# Patient Record
Sex: MALE | Race: WHITE | NOT HISPANIC OR LATINO | Employment: OTHER | URBAN - METROPOLITAN AREA
[De-identification: names, ages, dates, MRNs, and addresses within clinical notes are randomized per-mention and may not be internally consistent; named-entity substitution may affect disease eponyms.]

---

## 2018-11-28 ENCOUNTER — OFFICE VISIT (OUTPATIENT)
Dept: URGENT CARE | Facility: CLINIC | Age: 58
End: 2018-11-28
Payer: MEDICAID

## 2018-11-28 VITALS
TEMPERATURE: 98 F | HEART RATE: 89 BPM | HEIGHT: 72 IN | SYSTOLIC BLOOD PRESSURE: 149 MMHG | RESPIRATION RATE: 16 BRPM | DIASTOLIC BLOOD PRESSURE: 97 MMHG | WEIGHT: 205 LBS | BODY MASS INDEX: 27.77 KG/M2

## 2018-11-28 DIAGNOSIS — J40 BRONCHITIS: ICD-10-CM

## 2018-11-28 DIAGNOSIS — J01.90 ACUTE NON-RECURRENT SINUSITIS, UNSPECIFIED LOCATION: ICD-10-CM

## 2018-11-28 DIAGNOSIS — R03.0 ELEVATED BLOOD PRESSURE READING: Primary | ICD-10-CM

## 2018-11-28 PROCEDURE — 99214 OFFICE O/P EST MOD 30 MIN: CPT | Mod: 25,S$GLB,, | Performed by: NURSE PRACTITIONER

## 2018-11-28 RX ORDER — FLUTICASONE PROPIONATE 50 MCG
2 SPRAY, SUSPENSION (ML) NASAL DAILY
Qty: 15.8 ML | Refills: 0 | Status: SHIPPED | OUTPATIENT
Start: 2018-11-28 | End: 2019-06-25 | Stop reason: CLARIF

## 2018-11-28 RX ORDER — DEXAMETHASONE SODIUM PHOSPHATE 4 MG/ML
8 INJECTION, SOLUTION INTRA-ARTICULAR; INTRALESIONAL; INTRAMUSCULAR; INTRAVENOUS; SOFT TISSUE
Status: COMPLETED | OUTPATIENT
Start: 2018-11-28 | End: 2018-11-28

## 2018-11-28 RX ORDER — PREDNISONE 20 MG/1
40 TABLET ORAL DAILY
Qty: 10 TABLET | Refills: 0 | Status: SHIPPED | OUTPATIENT
Start: 2018-11-28 | End: 2018-12-03

## 2018-11-28 RX ORDER — CETIRIZINE HYDROCHLORIDE 10 MG/1
10 TABLET ORAL DAILY
Qty: 30 TABLET | Refills: 0 | Status: SHIPPED | OUTPATIENT
Start: 2018-11-28 | End: 2019-06-25 | Stop reason: CLARIF

## 2018-11-28 RX ORDER — AZITHROMYCIN 250 MG/1
TABLET, FILM COATED ORAL
Qty: 6 TABLET | Refills: 0 | Status: SHIPPED | OUTPATIENT
Start: 2018-11-28 | End: 2019-06-25 | Stop reason: CLARIF

## 2018-11-28 RX ORDER — PROMETHAZINE HYDROCHLORIDE AND DEXTROMETHORPHAN HYDROBROMIDE 6.25; 15 MG/5ML; MG/5ML
5 SYRUP ORAL NIGHTLY PRN
Qty: 118 ML | Refills: 0 | Status: SHIPPED | OUTPATIENT
Start: 2018-11-28 | End: 2018-12-08

## 2018-11-28 RX ORDER — BENZONATATE 100 MG/1
100 CAPSULE ORAL EVERY 6 HOURS PRN
Qty: 40 CAPSULE | Refills: 0 | Status: SHIPPED | OUTPATIENT
Start: 2018-11-28 | End: 2018-12-08

## 2018-11-28 RX ADMIN — DEXAMETHASONE SODIUM PHOSPHATE 8 MG: 4 INJECTION, SOLUTION INTRA-ARTICULAR; INTRALESIONAL; INTRAMUSCULAR; INTRAVENOUS; SOFT TISSUE at 05:11

## 2018-11-28 NOTE — PATIENT INSTRUCTIONS
Sinusitis (Antibiotic Treatment)    The sinuses are air-filled spaces within the bones of the face. They connect to the inside of the nose. Sinusitis is an inflammation of the tissue lining the sinus cavity. Sinus inflammation can occur during a cold. It can also be due to allergies to pollens and other particles in the air. Sinusitis can cause symptoms of sinus congestion and fullness. A sinus infection causes fever, headache and facial pain. There is often green or yellow drainage from the nose or into the back of the throat (post-nasal drip). You have been given antibiotics to treat this condition.  Home care:  · Take the full course of antibiotics as instructed. Do not stop taking them, even if you feel better.  · Drink plenty of water, hot tea, and other liquids. This may help thin mucus. It also may promote sinus drainage.  · Heat may help soothe painful areas of the face. Use a towel soaked in hot water. Or,  the shower and direct the hot spray onto your face. Using a vaporizer along with a menthol rub at night may also help.   · An expectorant containing guaifenesin may help thin the mucus and promote drainage from the sinuses.  · Over-the-counter decongestants may be used unless a similar medicine was prescribed. Nasal sprays work the fastest. Use one that contains phenylephrine or oxymetazoline. First blow the nose gently. Then use the spray. Do not use these medicines more often than directed on the label or symptoms may get worse. You may also use tablets containing pseudoephedrine. Avoid products that combine ingredients, because side effects may be increased. Read labels. You can also ask the pharmacist for help. (NOTE: Persons with high blood pressure should not use decongestants. They can raise blood pressure.)  · Over-the-counter antihistamines may help if allergies contributed to your sinusitis.    · Do not use nasal rinses or irrigation during an acute sinus infection, unless told to by  your health care provider. Rinsing may spread the infection to other sinuses.  · Use acetaminophen or ibuprofen to control pain, unless another pain medicine was prescribed. (If you have chronic liver or kidney disease or ever had a stomach ulcer, talk with your doctor before using these medicines. Aspirin should never be used in anyone under 18 years of age who is ill with a fever. It may cause severe liver damage.)  · Don't smoke. This can worsen symptoms.  Follow-up care  Follow up with your healthcare provider or our staff if you are not improving within the next week.  When to seek medical advice  Call your healthcare provider if any of these occur:  · Facial pain or headache becoming more severe  · Stiff neck  · Unusual drowsiness or confusion  · Swelling of the forehead or eyelids  · Vision problems, including blurred or double vision  · Fever of 100.4ºF (38ºC) or higher, or as directed by your healthcare provider  · Seizure  · Breathing problems  · Symptoms not resolving within 10 days  Date Last Reviewed: 4/13/2015  © 7539-8089 [employer]. [address] [place] [address]. All rights reserved. This information is not intended as a substitute for professional medical care. Always follow your healthcare professional's instructions.        What Is Acute Bronchitis?  Acute bronchitis is when the airways in your lungs (bronchial tubes) become red and swollen (inflamed). It is usually caused by a viral infection. But it can also occur because of a bacteria or allergen. Symptoms include a cough that produces yellow or greenish mucus and can last for days or sometimes weeks.  Inside healthy lungs    Air travels in and out of the lungs through the airways. The linings of these airways produce sticky mucus. This mucus traps particles that enter the lungs. Tiny structures called cilia then sweep the particles out of the airways.     Healthy airway: Airways are normally open. Air moves in and  out easily.      Healthy cilia: Tiny, hairlike cilia sweep mucus and particles up and out of the airways.   Lungs with bronchitis  Bronchitis often occurs with a cold or the flu virus. The airways become inflamed (red and swollen). There is a deep hacking cough from the extra mucus. Other symptoms may include:  · Wheezing  · Chest discomfort  · Shortness of breath  · Mild fever  A second infection, this time due to bacteria, may then occur. And airways irritated by allergens or smoke are more likely to get infected.        Inflamed airway: Inflammation and extra mucus narrow the airway, causing shortness of breath.      Impaired cilia: Extra mucus impairs cilia, causing congestion and wheezing. Smoking makes the problem worse.   Making a diagnosis  A physical exam, health history, and certain tests help your healthcare provider make the diagnosis.  Health history  Your healthcare provider will ask you about your symptoms.  The exam  Your provider listens to your chest for signs of congestion. He or she may also check your ears, nose, and throat.  Possible tests  · A sputum test for bacteria. This requires a sample of mucus from your lungs.  · A nasal or throat swab. This tests to see if you have a bacterial infection.  · A chest X-ray. This is done if your healthcare provider thinks you have pneumonia.  · Tests to check for an underlying condition. Other tests may be done to check for things such as allergies, asthma, or COPD (chronic obstructive pulmonary disease). You may need to see a specialist for more lung function testing.  Treating a cough  The main treatment for bronchitis is easing symptoms. Avoiding smoke, allergens, and other things that trigger coughing can often help. If the infection is bacterial, you may be given antibiotics. During the illness, it's important to get plenty of sleep. To ease symptoms:  · Dont smoke. Also avoid secondhand smoke.  · Use a humidifier. Or try breathing in steam from a  hot shower. This may help loosen mucus.  · Drink a lot of water and juice. They can soothe the throat and may help thin mucus.  · Sit up or use extra pillows when in bed. This helps to lessen coughing and congestion.  · Ask your provider about using medicine. Ask about using cough medicine, pain and fever medicine, or a decongestant.  Antibiotics  Most cases of bronchitis are caused by cold or flu viruses. They dont need antibiotics to treat them, even if your mucus is thick and green or yellow. Antibiotics dont treat viral illness and antibiotics have not been shown to have any benefit in cases of acute bronchitis. Taking antibiotics when they are not needed increases your risk of getting an infection later that is antibiotic-resistant. Antibiotics can also cause severe cases of diarrhea that require other antibiotics to treat.  It is important that you accept your healthcare provider's opinion to not use antibiotics. Your provider will prescribe antibiotics if the infection is caused by bacteria. If they are prescribed:  · Take all of the medicine. Take the medicine until it is used up, even if symptoms have improved. If you dont, the bronchitis may come back.  · Take the medicines as directed. For instance, some medicines should be taken with food.  · Ask about side effects. Ask your provider or pharmacist what side effects are common, and what to do about them.  Follow-up care  You should see your provider again in 2 to 3 weeks. By this time, symptoms should have improved. An infection that lasts longer may mean you have a more serious problem.  Prevention  · Avoid tobacco smoke. If you smoke, quit. Stay away from smoky places. Ask friends and family not to smoke around you, or in your home or car.  · Get checked for allergies.  · Ask your provider about getting a yearly flu shot. Also ask about pneumococcal or pneumonia shots.  · Wash your hands often. This helps reduce the chance of picking up viruses that  cause colds and flu.  Call your healthcare provider if:  · Symptoms worsen, or you have new symptoms  · Breathing problems worsen or  become severe  · Symptoms dont get better within a week, or within 3 days of taking antibiotics   Date Last Reviewed: 2/1/2017 © 2000-2017 [employer] [address] [place] [id number]. All rights reserved. This information is not intended as a substitute for professional medical care. Always follow your healthcare professional's instructions.        High Blood Pressure, To Be Confirmed, No Treatment  Your blood pressure today was higher than normal. Sometimes anxiety or pain can cause a temporary rise in blood pressure. It later returns to normal. Blood pressure that is high only one time doesnt mean that you have high blood pressure (hypertension). High blood pressure is a chronic illness. But you should have your blood pressure measured again within the next few days to find out if its still high.    A blood pressure reading is made up of two numbers: a higher number over a lower number. The top number is the systolic pressure. The bottom number is the diastolic pressure. A normal blood pressure is a systolic pressure of less than 120 over a diastolic pressure of less than 80. You will see your blood pressure readings written together. For example, a person with a systolic pressure of 118 and a diastolic pressure of 78 will have 118/78 written in the medical record.     High blood pressure is when either the top number is 140 or higher, or the bottom number is 90 or higher. This must be the result when taking your blood pressure a number of times.   The blood pressures between normal and high are called prehypertension. This is systolic pressure of 120 to 140 or diastolic pressure of 80 to 89. Prehypertension means you are at risk of getting high blood pressure. It's a warning sign. The information gives you a chance to  make lifestyle changes such as  weight loss, exercise, and quitting smoking, that can keep your blood pressure from going higher. You should have your blood pressure checked regularly to be sure it isnt rising.  Home care  To track your blood pressure, your provider may ask you to come into the office at different times and on different days. If your healthcare provider asks you to check your readings at home, ask him or her what times of the day to test and for how many days. Before you leave the office, ask your provider to show you how to take your blood pressure and be sure to ask questions if you don't understand something.  Consider buying an automatic blood pressure monitor. Ask your provider for a recommendation. You can buy blood pressure monitors at most pharmacies.  The American Heart Association recommends the following guidelines for home blood pressure monitoring:  · Don't smoke or drink coffee for 30 minutes before taking your blood pressure.  · Go to the bathroom before the test.  · Relax for 5 minutes before taking the measurement.  · Sit with your back supported (don't sit on a couch or soft chair); keep your feet on the floor uncrossed. Place your arm on a solid flat surface (like a table) with the upper part of the arm at heart level. Place the middle of the cuff directly above the eye of the elbow. Check the monitor's instruction manual for an illustration.  · Take multiple readings. When you measure, take 2 to 3 readings one minute apart and record all of the results.  · Take your blood pressure at the same time every day, or as your healthcare provider recommends.  · Record the date, time, and blood pressure reading.  · Take the record with you to your next medical appointment. If your blood pressure monitor has a built-in memory, simply take the monitor with you to your next appointment.  · Call your provider if you have several high readings. Don't be frightened by a single high blood pressure reading, but if you get  several high readings, check in with your healthcare provider.  · Note: When blood pressure reaches a systolic (top number) of 180 or higher OR diastolic (bottom number) of 110 or higher, seek emergency medical treatment.  Follow-up care  Keep all of your follow up appointments. If your blood pressure is high (more than 120 over 80) on 2 out of 3 days, you will need to follow up with your healthcare provider for more evaluation and treatment.  Dont put this off! High blood pressure can be treated. High blood pressure thats not treated raises your risk for heart attack and stroke.  When to seek medical advice  Call your healthcare provider right away if any of these occur:  · Blood pressure reaches a systolic (top number) of 180 or higher, OR diastolic (bottom number) of 110 or higher  · Chest pain or shortness of breath  · Severe headache  · Throbbing or rushing sound in the ears  · Nosebleed  · Sudden severe pain in your belly (abdomen)  · Extreme drowsiness, confusion, or fainting  · Dizziness or dizziness with spinning sensation (vertigo)  · Weakness of an arm or leg or one side of the face  · You have problems speaking or seeing   Date Last Reviewed: 12/1/2016  © 2823-6433 [employer]. [address] [place] [address], [place] [address]. All rights reserved. This information is not intended as a substitute for professional medical care. Always follow your healthcare professional's instructions.

## 2018-11-28 NOTE — PROGRESS NOTES
Subjective:       Patient ID: Gagan Escobar is a 58 y.o. male.    Vitals:  vitals were not taken for this visit.     Chief Complaint: Cough (X 8 days); Nasal Congestion; and Otalgia    Pt sinus congestion, cough, nasal congestion, facial pressure for 8 days. Denies n/v/d, sob, chest pain, wheezing.       Cough   This is a new problem. The current episode started in the past 7 days. The problem has been gradually worsening. The cough is productive of sputum. Associated symptoms include ear pain, myalgias, nasal congestion, postnasal drip and a sore throat. Pertinent negatives include no chest pain, chills, fever, headaches, rash or shortness of breath.   Otalgia    There is pain in both ears. Associated symptoms include coughing and a sore throat. Pertinent negatives include no abdominal pain, diarrhea, headaches, rash or vomiting.       Constitution: Negative for chills, fatigue and fever.   HENT: Positive for ear pain, postnasal drip, sinus pain, sinus pressure and sore throat. Negative for congestion.    Neck: Negative for painful lymph nodes.   Cardiovascular: Negative for chest pain and leg swelling.   Eyes: Negative for double vision and blurred vision.   Respiratory: Positive for cough and sputum production. Negative for shortness of breath and asthma.    Gastrointestinal: Negative for abdominal pain, nausea, vomiting, constipation and diarrhea.   Endocrine: negative.   Genitourinary: Negative for dysuria, frequency and urgency.   Musculoskeletal: Positive for muscle ache. Negative for joint pain, joint swelling and muscle cramps.   Skin: Negative for color change, pale and rash.   Allergic/Immunologic: Negative for seasonal allergies and asthma.   Neurological: Negative for dizziness, history of vertigo, light-headedness, passing out and headaches.   Hematologic/Lymphatic: Negative for swollen lymph nodes, easy bruising/bleeding and history of blood clots. Does not bruise/bleed easily.    Psychiatric/Behavioral: Negative for nervous/anxious, sleep disturbance and depression. The patient is not nervous/anxious.        Objective:      Physical Exam   Constitutional: He is oriented to person, place, and time. Vital signs are normal. He appears well-developed and well-nourished.  Non-toxic appearance. He does not have a sickly appearance. He does not appear ill.   HENT:   Right Ear: Hearing, tympanic membrane, external ear and ear canal normal.   Left Ear: Hearing, tympanic membrane, external ear and ear canal normal.   Nose: Mucosal edema and rhinorrhea present. Right sinus exhibits maxillary sinus tenderness and frontal sinus tenderness. Left sinus exhibits maxillary sinus tenderness and frontal sinus tenderness.   Mouth/Throat: Uvula is midline and mucous membranes are normal. Posterior oropharyngeal erythema present. No oropharyngeal exudate or posterior oropharyngeal edema.   Eyes: Conjunctivae and EOM are normal. Pupils are equal, round, and reactive to light.   Neck: Normal range of motion and full passive range of motion without pain. Neck supple.   Cardiovascular: Normal rate, regular rhythm, S1 normal, S2 normal and normal heart sounds.   Pulmonary/Chest: Effort normal and breath sounds normal. No respiratory distress.   Abdominal: Soft. Normal appearance and bowel sounds are normal. There is no tenderness.   Lymphadenopathy:     He has no cervical adenopathy.   Neurological: He is alert and oriented to person, place, and time. GCS eye subscore is 4. GCS verbal subscore is 5. GCS motor subscore is 6.   Skin: Skin is warm, dry and intact. No rash noted.       Assessment:       1. Elevated blood pressure reading    2. Acute non-recurrent sinusitis, unspecified location    3. Bronchitis        Plan:       Due to patient presentation as well as length of symptoms, will treat for bacterial sinusitis. Patient is being discharged home. Discussed reasons to return and importance of followup. All  questions addressed and patient given discharge instructions and followup information.    Elevated blood pressure reading    Acute non-recurrent sinusitis, unspecified location    Bronchitis    Other orders  -     dexamethasone injection 8 mg  -     azithromycin (ZITHROMAX) 250 MG tablet; Take 2 tablets (500 mg) on  Day 1,  followed by 1 tablet (250 mg) once daily on Days 2 through 5.  Dispense: 6 tablet; Refill: 0  -     promethazine-dextromethorphan (PROMETHAZINE-DM) 6.25-15 mg/5 mL Syrp; Take 5 mLs by mouth nightly as needed.  Dispense: 118 mL; Refill: 0  -     benzonatate (TESSALON PERLES) 100 MG capsule; Take 1 capsule (100 mg total) by mouth every 6 (six) hours as needed for Cough.  Dispense: 40 capsule; Refill: 0  -     predniSONE (DELTASONE) 20 MG tablet; Take 2 tablets (40 mg total) by mouth once daily. for 5 days  Dispense: 10 tablet; Refill: 0  -     fluticasone (FLONASE) 50 mcg/actuation nasal spray; 2 sprays (100 mcg total) by Each Nare route once daily.  Dispense: 15.8 mL; Refill: 0  -     cetirizine (ZYRTEC) 10 MG tablet; Take 1 tablet (10 mg total) by mouth once daily.  Dispense: 30 tablet; Refill: 0

## 2019-02-07 ENCOUNTER — CLINICAL SUPPORT (OUTPATIENT)
Dept: URGENT CARE | Facility: CLINIC | Age: 59
End: 2019-02-07
Payer: MEDICAID

## 2019-02-07 VITALS
HEART RATE: 99 BPM | WEIGHT: 217 LBS | RESPIRATION RATE: 22 BRPM | DIASTOLIC BLOOD PRESSURE: 92 MMHG | SYSTOLIC BLOOD PRESSURE: 158 MMHG | OXYGEN SATURATION: 95 % | TEMPERATURE: 99 F | HEIGHT: 72 IN | BODY MASS INDEX: 29.39 KG/M2

## 2019-02-07 DIAGNOSIS — J06.9 VIRAL URI WITH COUGH: ICD-10-CM

## 2019-02-07 DIAGNOSIS — J10.1 INFLUENZA A: ICD-10-CM

## 2019-02-07 DIAGNOSIS — R05.9 COUGH: Primary | ICD-10-CM

## 2019-02-07 LAB
CTP QC/QA: YES
FLUAV AG NPH QL: POSITIVE
FLUBV AG NPH QL: NEGATIVE

## 2019-02-07 PROCEDURE — 87804 INFLUENZA ASSAY W/OPTIC: CPT | Mod: QW,,, | Performed by: NURSE PRACTITIONER

## 2019-02-07 PROCEDURE — 99214 OFFICE O/P EST MOD 30 MIN: CPT | Mod: 25,S$GLB,, | Performed by: NURSE PRACTITIONER

## 2019-02-07 PROCEDURE — 99214 PR OFFICE/OUTPT VISIT, EST, LEVL IV, 30-39 MIN: ICD-10-PCS | Mod: 25,S$GLB,, | Performed by: NURSE PRACTITIONER

## 2019-02-07 PROCEDURE — 87804 POCT INFLUENZA A/B: ICD-10-PCS | Mod: 59,QW,, | Performed by: NURSE PRACTITIONER

## 2019-02-07 RX ORDER — BENZONATATE 100 MG/1
200 CAPSULE ORAL 3 TIMES DAILY PRN
Qty: 30 CAPSULE | Refills: 1 | Status: SHIPPED | OUTPATIENT
Start: 2019-02-07 | End: 2019-06-25 | Stop reason: CLARIF

## 2019-02-07 RX ORDER — OSELTAMIVIR PHOSPHATE 75 MG/1
75 CAPSULE ORAL 2 TIMES DAILY
Qty: 10 CAPSULE | Refills: 0 | Status: SHIPPED | OUTPATIENT
Start: 2019-02-07 | End: 2019-02-12

## 2019-02-07 NOTE — LETTER
February 7, 2019      [facility]  [address]  [facility] [address]  Phone: [phone]       Patient: Gagan Escobar   YOB: 1960  Date of Visit: 02/07/2019    To Whom It May Concern:    JAQUELIN Escobar  was at [facility] on 02/07/2019. He may return to work/school on 2/11/19 with no restrictions. If you have any questions or concerns, or if I can be of further assistance, please do not hesitate to contact me.    Sincerely,    Ashlee Mcnair, NP

## 2019-02-08 NOTE — PATIENT INSTRUCTIONS
The Flu (Influenza)     The virus that causes the flu spreads through the air in droplets when someone who has the flu coughs, sneezes, laughs, or talks.   The flu (influenza) is an infection that affects your respiratory tract. This tract is made up of your mouth, nose, and lungs, and the passages between them. Unlike a cold, the flu can make you very ill. And it can lead to pneumonia, a serious lung infection. The flu can have serious complications and even cause death.  Who is at risk for the flu?  Anyone can get the flu. But you are more likely to become infected if you:  · Have a weakened immune system  · Work in a healthcare setting where you may be exposed to flu germs  · Live or work with someone who has the flu  · Havent had an annual flu shot  How does the flu spread?  The flu is caused by a virus. The virus spreads through the air in droplets when someone who has the flu coughs, sneezes, laughs, or talks. You can become infected when you inhale these viruses directly. You can also become infected when you touch a surface on which the droplets have landed and then transfer the germs to your eyes, nose, or mouth. Touching used tissues, or sharing utensils, drinking glasses, or a toothbrush from an infected person can expose you to flu viruses, too.  What are the symptoms of the flu?  Flu symptoms tend to come on quickly and may last a few days to a few weeks. They include:  · Fever usually higher than 100.4°F  (38°C) and chills  · Sore throat and headache  · Dry cough  · Runny nose  · Tiredness and weakness  · Muscle aches  Who is at risk for flu complications?  For some people, the flu can be very serious. The risk for complications is greater for:  · Children younger than age 5  · Adults ages 65 and older  · People with a chronic illness such as diabetes or heart, kidney, or lung disease  · People who live in a nursing home or long-term care facility   How is the flu treated?  The flu usually gets  better after 7 days or so. In some cases, your healthcare provider may prescribe an antiviral medicine. This may help you get well a little sooner. For the medicine to help, you need to take it as soon as possible (ideally within 48 hours) after your symptoms start. If you develop pneumonia or other serious illness, you may need to stay in the hospital.  Easing flu symptoms  · Drink lots of fluids such as water, juice, and warm soup. A good rule is to drink enough so that you urinate your normal amount.  · Get plenty of rest.  · Ask your healthcare provider what to take for fever and pain.  · Call your provider if your fever is 100.4°F (38°C) or higher, or you become dizzy, lightheaded, or short of breath.  Taking steps to protect others  · Wash your hands often, especially after coughing or sneezing. Or clean your hands with an alcohol-based hand  containing at least 60% alcohol.  · Cough or sneeze into a tissue. Then throw the tissue away and wash your hands. If you dont have a tissue, cough and sneeze into your elbow.  · Stay home until at least 24 hours after you no longer have a fever or chills. Be sure the fever isnt being hidden by fever-reducing medicine.  · Dont share food, utensils, drinking glasses, or a toothbrush with others.  · Ask your healthcare provider if others in your household should get antiviral medicine to help them avoid infection.  How can the flu be prevented?  · One of the best ways to avoid the flu is to get a flu vaccine each year. The virus that causes the flu changes from year to year. For that reason, healthcare providers recommend getting the flu vaccine each year, as soon as it's available in your area. The vaccine is given as a shot. Your healthcare provider can tell you which vaccine is right for you. A nasal spray is also available but is not recommended for the 2648-3646 flu season. The CDC says this is because the nasal spray did not seem to protect against the flu  over the last several flu seasons. In the past, it was meant for people ages 2 to 49.  · Wash your hands often. Frequent handwashing is a proven way to help prevent infection.  · Carry an alcohol-based hand gel containing at least 60% alcohol. Use it when you can't use soap and water. Then wash your hands as soon as you can.  · Avoid touching your eyes, nose, and mouth.  · At home and work, clean phones, computer keyboards, and toys often with disinfectant wipes.  · If possible, avoid close contact with others who have the flu or symptoms of the flu.  Handwashing tips  Handwashing is one of the best ways to prevent many common infections. If you are caring for or visiting someone with the flu, wash your hands each time you enter and leave the room. Follow these steps:  · Use warm water and plenty of soap. Rub your hands together well.  · Clean the whole hand, including under your nails, between your fingers, and up the wrists.  · Wash for at least 15 seconds.  · Rinse, letting the water run down your fingers, not up your wrists.  · Dry your hands well. Use a paper towel to turn off the faucet and open the door.  Using alcohol-based hand   Alcohol-based hand  are also a good choice. Use them when you can't use soap and water. Follow these steps:  · Squeeze about a tablespoon of gel into the palm of one hand.  · Rub your hands together briskly, cleaning the backs of your hands, the palms, between your fingers, and up the wrists.  · Rub until the gel is gone and your hands are completely dry.  Preventing the flu in healthcare settings  The flu is a special concern for people in hospitals and long-term care facilities. To help prevent the spread of flu, many hospitals and nursing homes take these steps:  · Healthcare providers wash their hands or use an alcohol-based hand  before and after treating each patient.  · People with the flu have private rooms and bathrooms or share a room with someone  with the same infection.  · People who are at high risk for the flu but don't have it are encouraged to get the flu and pneumonia vaccines.  · All healthcare workers are encouraged or required to get flu shots.   Date Last Reviewed: 12/1/2016  © 5623-5647 [employer] [address] [place] [address]. All rights reserved. This information is not intended as a substitute for professional medical care. Always follow your healthcare professional's instructions.

## 2019-02-08 NOTE — PROGRESS NOTES
Subjective:       Patient ID: Gagan Escobar is a 58 y.o. male.    Vitals:  height is 6' (1.829 m) and weight is 98.4 kg (217 lb). His temperature is 99.1 °F (37.3 °C). His blood pressure is 158/92 (abnormal) and his pulse is 99. His respiration is 22 (abnormal) and oxygen saturation is 95%.     Chief Complaint: Cough    PT presents with nasal congestion, fever, body aches and sore throat since yesterday.       Cough   This is a new problem. The current episode started yesterday. The problem has been unchanged. The cough is productive of sputum. Associated symptoms include chills, a fever, headaches and nasal congestion. Pertinent negatives include no ear pain, eye redness, hemoptysis, myalgias, rash, sore throat, shortness of breath or wheezing. Associated symptoms comments: Body aches. Nothing aggravates the symptoms. Treatments tried: Equate cold & flu, aleve. The treatment provided no relief. His past medical history is significant for bronchitis.       Constitution: Positive for chills, fatigue and fever. Negative for sweating.   HENT: Negative for ear pain, congestion, sinus pain, sinus pressure, sore throat and voice change.    Neck: Negative for painful lymph nodes.   Eyes: Negative for eye redness.   Respiratory: Positive for cough. Negative for chest tightness, sputum production, bloody sputum, COPD, shortness of breath, stridor, wheezing and asthma.    Gastrointestinal: Negative for nausea and vomiting.   Musculoskeletal: Negative for muscle ache.   Skin: Negative for rash.   Allergic/Immunologic: Negative for seasonal allergies and asthma.   Neurological: Positive for headaches.   Hematologic/Lymphatic: Negative for swollen lymph nodes.       Objective:      Physical Exam   Constitutional: He is oriented to person, place, and time. He appears well-developed and well-nourished. He is cooperative.  Non-toxic appearance. He does not appear ill. No distress.   HENT:   Head: Normocephalic and atraumatic.    Right Ear: Hearing, tympanic membrane, external ear and ear canal normal.   Left Ear: Hearing, tympanic membrane, external ear and ear canal normal.   Nose: Mucosal edema and rhinorrhea present. No nasal deformity. No epistaxis. Right sinus exhibits no maxillary sinus tenderness and no frontal sinus tenderness. Left sinus exhibits no maxillary sinus tenderness and no frontal sinus tenderness.   Mouth/Throat: Uvula is midline, oropharynx is clear and moist and mucous membranes are normal. No trismus in the jaw. Normal dentition. No uvula swelling. No posterior oropharyngeal erythema.   Eyes: Conjunctivae and lids are normal. No scleral icterus.   Sclera clear bilat   Neck: Trachea normal, full passive range of motion without pain and phonation normal. Neck supple.   Cardiovascular: Normal rate, regular rhythm, normal heart sounds, intact distal pulses and normal pulses.   Pulmonary/Chest: Effort normal and breath sounds normal. No respiratory distress.   Abdominal: Soft. Normal appearance and bowel sounds are normal. He exhibits no distension. There is no tenderness.   Musculoskeletal: Normal range of motion. He exhibits no edema or deformity.   Neurological: He is alert and oriented to person, place, and time. He exhibits normal muscle tone. Coordination normal.   Skin: Skin is warm, dry and intact. He is not diaphoretic. No pallor.   Psychiatric: He has a normal mood and affect. His speech is normal and behavior is normal. Judgment and thought content normal. Cognition and memory are normal.   Nursing note and vitals reviewed.      Assessment:       1. Cough    2. Influenza A    3. Viral URI with cough        Plan:         Cough  -     POCT Influenza A/B    Influenza A    Viral URI with cough    Other orders  -     oseltamivir (TAMIFLU) 75 MG capsule; Take 1 capsule (75 mg total) by mouth 2 (two) times daily. for 5 days  Dispense: 10 capsule; Refill: 0  -     benzonatate (TESSALON PERLES) 100 MG capsule; Take 2  capsules (200 mg total) by mouth 3 (three) times daily as needed.  Dispense: 30 capsule; Refill: 1    flu A positive

## 2019-06-25 ENCOUNTER — HOSPITAL ENCOUNTER (EMERGENCY)
Facility: HOSPITAL | Age: 59
Discharge: HOME | End: 2019-06-25
Attending: EMERGENCY MEDICINE
Payer: MEDICAID

## 2019-06-25 VITALS
DIASTOLIC BLOOD PRESSURE: 105 MMHG | SYSTOLIC BLOOD PRESSURE: 155 MMHG | WEIGHT: 230 LBS | HEIGHT: 72 IN | RESPIRATION RATE: 20 BRPM | BODY MASS INDEX: 31.15 KG/M2 | TEMPERATURE: 98 F | HEART RATE: 71 BPM | OXYGEN SATURATION: 96 %

## 2019-06-25 DIAGNOSIS — E08.65 DIABETES MELLITUS DUE TO UNDERLYING CONDITION WITH HYPERGLYCEMIA, WITHOUT LONG-TERM CURRENT USE OF INSULIN: Primary | ICD-10-CM

## 2019-06-25 DIAGNOSIS — I10 HYPERTENSION, UNSPECIFIED TYPE: ICD-10-CM

## 2019-06-25 LAB
ALBUMIN SERPL BCP-MCNC: 4.1 G/DL (ref 3.5–5.2)
ALP SERPL-CCNC: 86 U/L (ref 55–135)
ALT SERPL W/O P-5'-P-CCNC: 31 U/L (ref 10–44)
ANION GAP SERPL CALC-SCNC: 12 MMOL/L (ref 8–16)
AST SERPL-CCNC: 22 U/L (ref 10–40)
BACTERIA #/AREA URNS HPF: NORMAL /HPF
BASOPHILS # BLD AUTO: 0.04 K/UL (ref 0–0.2)
BASOPHILS NFR BLD: 0.4 % (ref 0–1.9)
BILIRUB SERPL-MCNC: 0.7 MG/DL (ref 0.1–1)
BILIRUB UR QL STRIP: NEGATIVE
BUN SERPL-MCNC: 15 MG/DL (ref 6–20)
CALCIUM SERPL-MCNC: 10.1 MG/DL (ref 8.7–10.5)
CHLORIDE SERPL-SCNC: 93 MMOL/L (ref 95–110)
CLARITY UR: CLEAR
CO2 SERPL-SCNC: 24 MMOL/L (ref 23–29)
COLOR UR: YELLOW
CREAT SERPL-MCNC: 1.3 MG/DL (ref 0.5–1.4)
DIFFERENTIAL METHOD: NORMAL
EOSINOPHIL # BLD AUTO: 0.4 K/UL (ref 0–0.5)
EOSINOPHIL NFR BLD: 3.9 % (ref 0–8)
ERYTHROCYTE [DISTWIDTH] IN BLOOD BY AUTOMATED COUNT: 12.3 % (ref 11.5–14.5)
EST. GFR  (AFRICAN AMERICAN): >60 ML/MIN/1.73 M^2
EST. GFR  (NON AFRICAN AMERICAN): >60 ML/MIN/1.73 M^2
GLUCOSE SERPL-MCNC: 536 MG/DL (ref 70–110)
GLUCOSE UR QL STRIP: ABNORMAL
HCT VFR BLD AUTO: 47.2 % (ref 40–54)
HGB BLD-MCNC: 16.3 G/DL (ref 14–18)
HGB UR QL STRIP: NEGATIVE
IMM GRANULOCYTES # BLD AUTO: 0.03 K/UL (ref 0–0.04)
KETONES UR QL STRIP: NEGATIVE
LEUKOCYTE ESTERASE UR QL STRIP: NEGATIVE
LYMPHOCYTES # BLD AUTO: 2.8 K/UL (ref 1–4.8)
LYMPHOCYTES NFR BLD: 30.5 % (ref 18–48)
MCH RBC QN AUTO: 30.1 PG (ref 27–31)
MCHC RBC AUTO-ENTMCNC: 34.5 G/DL (ref 32–36)
MCV RBC AUTO: 87 FL (ref 82–98)
MICROSCOPIC COMMENT: NORMAL
MONOCYTES # BLD AUTO: 0.8 K/UL (ref 0.3–1)
MONOCYTES NFR BLD: 8.2 % (ref 4–15)
NEUTROPHILS # BLD AUTO: 5.3 K/UL (ref 1.8–7.7)
NEUTROPHILS NFR BLD: 56.7 % (ref 38–73)
NITRITE UR QL STRIP: NEGATIVE
NRBC BLD-RTO: 0 /100 WBC
PH UR STRIP: 6 [PH] (ref 5–8)
PLATELET # BLD AUTO: 324 K/UL (ref 150–350)
PMV BLD AUTO: 9.6 FL (ref 9.2–12.9)
POCT GLUCOSE: 256 MG/DL (ref 70–110)
POCT GLUCOSE: 301 MG/DL (ref 70–110)
POTASSIUM SERPL-SCNC: 4.3 MMOL/L (ref 3.5–5.1)
PROT SERPL-MCNC: 8 G/DL (ref 6–8.4)
PROT UR QL STRIP: NEGATIVE
RBC # BLD AUTO: 5.42 M/UL (ref 4.6–6.2)
RBC #/AREA URNS HPF: 1 /HPF (ref 0–4)
SODIUM SERPL-SCNC: 129 MMOL/L (ref 136–145)
SP GR UR STRIP: 1.01 (ref 1–1.03)
SQUAMOUS #/AREA URNS HPF: 0 /HPF
T4 FREE SERPL-MCNC: 0.83 NG/DL (ref 0.71–1.51)
TSH SERPL DL<=0.005 MIU/L-ACNC: 6.58 UIU/ML (ref 0.4–4)
URN SPEC COLLECT METH UR: ABNORMAL
UROBILINOGEN UR STRIP-ACNC: NEGATIVE EU/DL
WBC # BLD AUTO: 9.27 K/UL (ref 3.9–12.7)
WBC #/AREA URNS HPF: 0 /HPF (ref 0–5)
YEAST URNS QL MICRO: NORMAL

## 2019-06-25 PROCEDURE — 63600175 PHARM REV CODE 636 W HCPCS: Performed by: EMERGENCY MEDICINE

## 2019-06-25 PROCEDURE — 96374 THER/PROPH/DIAG INJ IV PUSH: CPT

## 2019-06-25 PROCEDURE — 96361 HYDRATE IV INFUSION ADD-ON: CPT

## 2019-06-25 PROCEDURE — 82962 GLUCOSE BLOOD TEST: CPT

## 2019-06-25 PROCEDURE — 83036 HEMOGLOBIN GLYCOSYLATED A1C: CPT

## 2019-06-25 PROCEDURE — 84439 ASSAY OF FREE THYROXINE: CPT

## 2019-06-25 PROCEDURE — 36415 COLL VENOUS BLD VENIPUNCTURE: CPT

## 2019-06-25 PROCEDURE — 80053 COMPREHEN METABOLIC PANEL: CPT

## 2019-06-25 PROCEDURE — 25000003 PHARM REV CODE 250: Performed by: EMERGENCY MEDICINE

## 2019-06-25 PROCEDURE — 85025 COMPLETE CBC W/AUTO DIFF WBC: CPT

## 2019-06-25 PROCEDURE — 99284 EMERGENCY DEPT VISIT MOD MDM: CPT | Mod: 25

## 2019-06-25 PROCEDURE — 81000 URINALYSIS NONAUTO W/SCOPE: CPT

## 2019-06-25 PROCEDURE — 84443 ASSAY THYROID STIM HORMONE: CPT

## 2019-06-25 RX ORDER — METFORMIN HYDROCHLORIDE 500 MG/1
500 TABLET ORAL 2 TIMES DAILY WITH MEALS
Qty: 60 TABLET | Refills: 0 | Status: SHIPPED | OUTPATIENT
Start: 2019-06-25 | End: 2020-02-16

## 2019-06-25 RX ORDER — AMLODIPINE BESYLATE 5 MG/1
5 TABLET ORAL
Status: COMPLETED | OUTPATIENT
Start: 2019-06-25 | End: 2019-06-25

## 2019-06-25 RX ORDER — AMLODIPINE BESYLATE 5 MG/1
10 TABLET ORAL DAILY
Qty: 30 TABLET | Refills: 0 | Status: SHIPPED | OUTPATIENT
Start: 2019-06-25 | End: 2020-09-14

## 2019-06-25 RX ORDER — INSULIN PUMP SYRINGE, 3 ML
EACH MISCELLANEOUS
Qty: 1 EACH | Refills: 0 | Status: SHIPPED | OUTPATIENT
Start: 2019-06-25 | End: 2020-09-14 | Stop reason: SDUPTHER

## 2019-06-25 RX ORDER — LANCETS
1 EACH MISCELLANEOUS
Qty: 200 EACH | Refills: 2 | Status: SHIPPED | OUTPATIENT
Start: 2019-06-25 | End: 2020-09-14 | Stop reason: SDUPTHER

## 2019-06-25 RX ADMIN — SODIUM CHLORIDE 1000 ML: 0.9 INJECTION, SOLUTION INTRAVENOUS at 08:06

## 2019-06-25 RX ADMIN — SODIUM CHLORIDE 1000 ML: 0.9 INJECTION, SOLUTION INTRAVENOUS at 09:06

## 2019-06-25 RX ADMIN — AMLODIPINE BESYLATE 5 MG: 5 TABLET ORAL at 08:06

## 2019-06-25 RX ADMIN — INSULIN HUMAN 6 UNITS: 100 INJECTION, SOLUTION PARENTERAL at 09:06

## 2019-06-26 LAB
ESTIMATED AVG GLUCOSE: 352 MG/DL (ref 68–131)
HBA1C MFR BLD HPLC: 13.9 % (ref 4–5.6)

## 2019-06-26 NOTE — ED PROVIDER NOTES
Encounter Date: 6/25/2019    SCRIBE #1 NOTE: I, Alba Galindo, am scribing for, and in the presence of, Larry Joya MD.       History     Chief Complaint   Patient presents with    Fatigue     no energy.  blood sugar >400.  hypertensive     Time seen by provider: 7:35 PM on 06/25/2019    Gagan Escobar is a 59 y.o. Male who presents to the ED with an onset of fatigue and weakness which began PTA. Pt states he was eating breakfast around 8:00 am and was feeling weak and fatigue which prompted him to check his glucose. His glucose level was 447. He states that he's been drinking a lot of water and urinating a lot. Pt also c/o of a sore throat. Pt denies any other symptoms at this time. No pertinent PSHx noted. No PSHx noted. Pcn drug allergy noted.            The history is provided by the patient.     Review of patient's allergies indicates:   Allergen Reactions    Pcn [penicillins]      No past medical history on file.  No past surgical history on file.  Family History   Problem Relation Age of Onset    No Known Problems Mother     No Known Problems Father      Social History     Tobacco Use    Smoking status: Never Smoker   Substance Use Topics    Alcohol use: No     Frequency: Never    Drug use: No     Review of Systems   Constitutional: Positive for fatigue. Negative for fever.   HENT: Positive for sore throat.    Respiratory: Negative for shortness of breath.    Cardiovascular: Negative for chest pain.   Gastrointestinal: Negative for nausea.   Genitourinary: Negative for decreased urine volume and dysuria.        +increase in urine     Musculoskeletal: Negative for back pain.   Skin: Negative for rash.   Neurological: Positive for weakness.   Hematological: Does not bruise/bleed easily.       Physical Exam     Initial Vitals [06/25/19 1908]   BP Pulse Resp Temp SpO2   (!) 179/99 90 20 97.9 °F (36.6 °C) 99 %      MAP       --         Physical Exam    Nursing note and vitals  reviewed.  Constitutional: He appears well-developed and well-nourished. No distress.   HENT:   Head: Normocephalic and atraumatic.   Eyes: Conjunctivae and EOM are normal. Pupils are equal, round, and reactive to light.   Neck: Neck supple.   Palpable mass over the nuchal region of neck.   Cardiovascular: Normal rate, regular rhythm and normal heart sounds. Exam reveals no gallop and no friction rub.    No murmur heard.  Pulmonary/Chest: Breath sounds normal. No respiratory distress. He has no wheezes. He has no rhonchi. He has no rales.   Abdominal: Soft. Bowel sounds are normal. He exhibits no distension. There is no tenderness.   Musculoskeletal: Normal range of motion. He exhibits no edema or tenderness.   Neurological: He is alert and oriented to person, place, and time.   Skin: Skin is warm and dry.   Psychiatric: He has a normal mood and affect.         ED Course   Procedures  Labs Reviewed   CBC W/ AUTO DIFFERENTIAL   COMPREHENSIVE METABOLIC PANEL   TSH   HEMOGLOBIN A1C   URINALYSIS, REFLEX TO URINE CULTURE          Imaging Results    None          Medical Decision Making:   History:   Old Medical Records: I decided to obtain old medical records.  Clinical Tests:   Lab Tests: Ordered and Reviewed  Patient appears to have undiagnosed diabetes.  He is hyperglycemic here but not in DKA.  He was given significant amount of IV fluids insulin and will be discharged to follow up with his primary care physician as previously scheduled appointment in a week or 2.  He also has undiagnosed uncontrolled hypertension and I will start him on Norvasc 5 mg.  I do not think he needs to be admitted at this time.  He was slightly fatigued but overall asymptomatic and his family decided to check his blood pressure and blood glucose randomly and that is why he came to the emergency department today.  I gave him very specific return precautions and instructed him to take his medicine as prescribed and follow up with his primary  care physician.            Scribe Attestation:   Scribe #1: I performed the above scribed service and the documentation accurately describes the services I performed. I attest to the accuracy of the note.    I, Dr. Larry Joya personally performed the services described in this documentation. All medical record entries made by the scribe were at my direction and in my presence.  I have reviewed the chart and agree that the record reflects my personal performance and is accurate and complete. Larry Joya MD.  10:34 PM 06/25/2019    DISCLAIMER: This note was prepared with Dragon NaturallySpeaking voice recognition transcription software. Garbled syntax, mangled pronouns, and other bizarre constructions may be attributed to that software system            Clinical Impression:       ICD-10-CM ICD-9-CM   1. Diabetes mellitus due to underlying condition with hyperglycemia, without long-term current use of insulin E08.65 249.80     790.29   2. Hypertension, unspecified type I10 401.9                                Larry Joya MD  06/25/19 1415

## 2019-06-26 NOTE — ED TRIAGE NOTES
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  Diabetes in the family.  Doesn't go to the doctor.  Knows he has hypertension.  Takes a regular aspirin on a regular basis.  Takes something for testosterone.

## 2019-06-26 NOTE — ED NOTES
Assumed care:  Patient awake, alert and oriented x 3, skin warm and dry, in NAD with family at bedside.    Patient identifiers for Gagan Escobar checked and correct.  LOC:  Patient is awake, alert, and aware of environment with an appropriate affect. Patient is oriented x 3 and speaking appropriately.  APPEARANCE:  Patient resting comfortably and in no acute distress. Patient is clean and well groomed, patient's clothing is properly fastened.  SKIN:  The skin is warm and dry. Patient has normal skin turgor and moist mucus membranes. Skin is intact; no bruising or breakdown noted.  MUSCULOSKELETAL:  Patient is moving all extremities well, no obvious deformities noted. Pulses intact.   RESPIRATORY:  Airway is open and patent. Respirations are spontaneous and non-labored with normal effort and rate.  CARDIAC:  Patient has a normal rate and rhythm. No peripheral edema noted. Capillary refill < 3 seconds.  ABDOMEN:  No distention noted.  Soft and non-tender upon palpation.  NEUROLOGICAL:  PERRL. Facial expression is symmetrical. Hand grasps are equal bilaterally. Normal sensation in all extremities when touched with finger.  Fatigue  Allergies reported:    Review of patient's allergies indicates:   Allergen Reactions    Pcn [penicillins]      OTHER NOTES:  Patient states that he has had fatigue for a while.  Daughter took blood sugar at home, >400 2 hours after breakfast.

## 2020-02-16 ENCOUNTER — CLINICAL SUPPORT (OUTPATIENT)
Dept: URGENT CARE | Facility: CLINIC | Age: 60
End: 2020-02-16
Payer: MEDICAID

## 2020-02-16 VITALS
BODY MASS INDEX: 31.19 KG/M2 | TEMPERATURE: 99 F | RESPIRATION RATE: 18 BRPM | HEIGHT: 72 IN | OXYGEN SATURATION: 97 % | DIASTOLIC BLOOD PRESSURE: 96 MMHG | SYSTOLIC BLOOD PRESSURE: 144 MMHG | HEART RATE: 113 BPM

## 2020-02-16 DIAGNOSIS — R05.9 COUGH: Primary | ICD-10-CM

## 2020-02-16 DIAGNOSIS — E11.9 TYPE 2 DIABETES MELLITUS WITHOUT COMPLICATION, UNSPECIFIED WHETHER LONG TERM INSULIN USE: ICD-10-CM

## 2020-02-16 DIAGNOSIS — J01.90 ACUTE NON-RECURRENT SINUSITIS, UNSPECIFIED LOCATION: ICD-10-CM

## 2020-02-16 LAB
CTP QC/QA: YES
FLUAV AG NPH QL: NEGATIVE
FLUBV AG NPH QL: NEGATIVE
GLUCOSE SERPL-MCNC: 288 MG/DL (ref 70–110)

## 2020-02-16 PROCEDURE — 99214 PR OFFICE/OUTPT VISIT, EST, LEVL IV, 30-39 MIN: ICD-10-PCS | Mod: S$GLB,,, | Performed by: NURSE PRACTITIONER

## 2020-02-16 PROCEDURE — 87804 POCT INFLUENZA A/B: ICD-10-PCS | Mod: QW,,, | Performed by: NURSE PRACTITIONER

## 2020-02-16 PROCEDURE — 82962 POCT GLUCOSE, HAND-HELD DEVICE: ICD-10-PCS | Mod: ,,, | Performed by: NURSE PRACTITIONER

## 2020-02-16 PROCEDURE — 82962 GLUCOSE BLOOD TEST: CPT | Mod: ,,, | Performed by: NURSE PRACTITIONER

## 2020-02-16 PROCEDURE — 87804 INFLUENZA ASSAY W/OPTIC: CPT | Mod: QW,,, | Performed by: NURSE PRACTITIONER

## 2020-02-16 PROCEDURE — 99214 OFFICE O/P EST MOD 30 MIN: CPT | Mod: S$GLB,,, | Performed by: NURSE PRACTITIONER

## 2020-02-16 RX ORDER — LOSARTAN POTASSIUM 100 MG/1
100 TABLET ORAL DAILY
COMMUNITY
End: 2020-09-14

## 2020-02-16 RX ORDER — GABAPENTIN 600 MG/1
600 TABLET ORAL 3 TIMES DAILY
COMMUNITY
End: 2020-09-14

## 2020-02-16 RX ORDER — DOXYCYCLINE 100 MG/1
100 CAPSULE ORAL DAILY
Qty: 7 CAPSULE | Refills: 0 | Status: SHIPPED | OUTPATIENT
Start: 2020-02-16 | End: 2020-02-23

## 2020-02-16 RX ORDER — CETIRIZINE HYDROCHLORIDE 10 MG/1
10 TABLET ORAL DAILY
Qty: 30 TABLET | Refills: 0 | Status: SHIPPED | OUTPATIENT
Start: 2020-02-16 | End: 2021-02-18

## 2020-02-16 RX ORDER — GLIPIZIDE 5 MG/1
5 TABLET ORAL
COMMUNITY
End: 2020-10-09

## 2020-02-16 RX ORDER — ATORVASTATIN CALCIUM 20 MG/1
20 TABLET, FILM COATED ORAL DAILY
COMMUNITY
End: 2020-10-09 | Stop reason: SDUPTHER

## 2020-02-16 RX ORDER — FLUTICASONE PROPIONATE 50 MCG
2 SPRAY, SUSPENSION (ML) NASAL DAILY
Qty: 15.8 ML | Refills: 0 | Status: SHIPPED | OUTPATIENT
Start: 2020-02-16 | End: 2020-09-14

## 2020-02-16 RX ORDER — ASPIRIN 81 MG/1
81 TABLET ORAL DAILY
COMMUNITY
End: 2021-01-13 | Stop reason: SDUPTHER

## 2020-02-16 NOTE — PATIENT INSTRUCTIONS
Acute Sinusitis    Acute sinusitis is irritation and swelling of the sinuses. It is usually caused by a viral infection after a common cold. Your doctor can help you find relief.  What is acute sinusitis?  Sinuses are air-filled spaces in the skull behind the face. They are kept moist and clean by a lining of mucosa. Things such as pollen, smoke, and chemical fumes can irritate the mucosa. It can then swell up. As a response to irritation, the mucosa makes more mucus and other fluids. Tiny hairlike cilia cover the mucosa. Cilia help carry mucus toward the opening of the sinus. Too much mucus may cause the cilia to stop working. This blocks the sinus opening. A buildup of fluid in the sinuses then causes pain and pressure. It can also encourage bacteria to grow in the sinuses.  Common symptoms of acute sinusitis  You may have:  · Facial soreness pain  · Headache  · Fever  · Fluid draining in the back of the throat (postnasal drip)  · Congestion  · Drainage that is thick and colored, instead of clear  · Cough  Diagnosing acute sinusitis  Your doctor will ask about your symptoms and health history. He or she will look at your ear, nose, and throat. You usually won't need to have X-rays taken.    The doctor may take a sample of mucus to check for bacteria. If you have sinusitis that keeps coming back, you may need imaging tests such as X-rays or CAT scans. This will help your doctor check for a structural problem that may be causing the infection.  Treating acute sinusitis  Treatment is aimed at unblocking the sinus opening and helping the cilia work again. You may need to take antihistamine and decongestant medicine. These can reduce inflammation and decrease the amount of fluid your sinuses make. If you have a bacterial infection, you will need to take antibiotic medicine for 10 to 14 days. Take this medicine until it is gone, even if you feel better.  Date Last Reviewed: 10/1/2016  © 1121-9664 [employer]  [employer] [address] [place] [address]. All rights reserved. This information is not intended as a substitute for professional medical care. Always follow your healthcare professional's instructions.        Self-Care for Sinusitis     Drinking plenty of water can help sinuses drain.   Sinusitis can often be managed with self-care. Self-care can keep sinuses moist and make you feel more comfortable. Remember to follow your doctor's instructions closely. This can make a big difference in getting your sinus problem under control.  Drink fluids  Drinking extra fluids helps thin your mucus. This lets it drain from your sinuses more easily. Have a glass of water every hour or two. A humidifier helps in much the same way. Fluids can also offset the drying effects of certain medicines. If you use a humidifier, follow the product maker's instructions on how to use it. Clean it on a regular schedule.  Use saltwater rinses  Rinses help keep your sinuses and nose moist. Mix a teaspoon of salt in 8 ounces of fresh, warm water. Use a bulb syringe to gently squirt the water into your nose a few times a day. You can also buy ready-made saline nasal sprays.  Apply hot or cold packs  Applying heat to the area surrounding your sinuses may make you feel more comfortable. Use a hot water bottle or a hand towel dipped in hot water. Some people also find ice packs effective for relieving pain.  Medicines  Your doctor may prescribe medications to help treat your sinusitis. If you have an infection, antibiotics can help clear it up. If you are prescribed antibiotics, take all pills on schedule until they are gone, even if you feel better. Decongestants help relieve swelling. Use decongestant sprays for short periods only under the direction of your doctor. If you have allergies, your doctor may prescribe medications to help relieve them.   Date Last Reviewed: 10/1/2016  © 9525-3947 [employer]. [address] [place] [address],  MATTY Choe [address]. All rights reserved. This information is not intended as a substitute for professional medical care. Always follow your healthcare professional's instructions.

## 2020-02-16 NOTE — PROGRESS NOTES
Subjective:       Patient ID: Gagan Escobar is a 59 y.o. male.    Vitals:  height is 6' (1.829 m). His oral temperature is 99.4 °F (37.4 °C). His blood pressure is 144/96 (abnormal) and his pulse is 113 (abnormal). His respiration is 18 and oxygen saturation is 97%.     Chief Complaint: URI (head/chest congestion, cough with green sputum, )    Patient complains of sinus congestion, sore throat, cough and facial pain that began 5 days ago. Denies fever, sob, chest pain, nausea, vomiting, diarrhea.       Constitution: Negative for chills, fatigue and fever.   HENT: Positive for congestion, sinus pain and sinus pressure. Negative for sore throat.    Neck: Negative for painful lymph nodes.   Cardiovascular: Negative for chest pain and leg swelling.   Eyes: Negative for double vision and blurred vision.   Respiratory: Positive for cough. Negative for shortness of breath.    Gastrointestinal: Negative for abdominal pain, nausea, vomiting and diarrhea.   Endocrine: negative.   Genitourinary: Negative for dysuria, frequency and urgency.   Musculoskeletal: Negative for joint pain, joint swelling, muscle cramps and muscle ache.   Skin: Negative for color change, pale and rash.   Allergic/Immunologic: Negative for seasonal allergies.   Neurological: Negative for dizziness, history of vertigo, light-headedness, passing out and headaches.   Hematologic/Lymphatic: Negative for swollen lymph nodes, easy bruising/bleeding and history of blood clots. Does not bruise/bleed easily.   Psychiatric/Behavioral: Negative for nervous/anxious, sleep disturbance and depression. The patient is not nervous/anxious.        Objective:      Physical Exam   Constitutional: He is oriented to person, place, and time. He appears well-developed and well-nourished. He is cooperative.  Non-toxic appearance. He does not have a sickly appearance. He does not appear ill. No distress.   HENT:   Head: Normocephalic and atraumatic.   Right Ear: Hearing,  tympanic membrane, external ear and ear canal normal.   Left Ear: Hearing, tympanic membrane, external ear and ear canal normal.   Nose: Mucosal edema and rhinorrhea present. No nasal deformity. No epistaxis. Right sinus exhibits no maxillary sinus tenderness and no frontal sinus tenderness. Left sinus exhibits no maxillary sinus tenderness and no frontal sinus tenderness.   Mouth/Throat: Uvula is midline, oropharynx is clear and moist and mucous membranes are normal. No trismus in the jaw. Normal dentition. No uvula swelling. Cobblestoning present. No posterior oropharyngeal erythema.   Eyes: Conjunctivae and lids are normal. Right eye exhibits no discharge. Left eye exhibits no discharge. No scleral icterus.   Neck: Trachea normal, normal range of motion, full passive range of motion without pain and phonation normal. Neck supple.   Cardiovascular: Normal rate, regular rhythm, normal heart sounds, intact distal pulses and normal pulses.   Pulmonary/Chest: Effort normal and breath sounds normal. No respiratory distress.   Abdominal: Soft. Normal appearance and bowel sounds are normal. He exhibits no distension, no pulsatile midline mass and no mass. There is no tenderness.   Musculoskeletal: Normal range of motion. He exhibits no edema or deformity.   Neurological: He is alert and oriented to person, place, and time. He exhibits normal muscle tone. Coordination normal. GCS eye subscore is 4. GCS verbal subscore is 5. GCS motor subscore is 6.   Skin: Skin is warm, dry, intact, not diaphoretic and not pale.   Psychiatric: He has a normal mood and affect. His speech is normal and behavior is normal. Judgment and thought content normal. Cognition and memory are normal.   Nursing note and vitals reviewed.        Assessment:       1. Cough    2. Type 2 diabetes mellitus without complication, unspecified whether long term insulin use    3. Acute non-recurrent sinusitis, unspecified location        Plan:       Influenza  negative. TNU=[id number]    Due to patient presentation as well as length of symptoms, will treat for bacterial sinusitis. Patient is being discharged home. Discussed reasons to return and importance of followup. All questions addressed and patient given discharge instructions and followup information.    Cough  -     POCT Influenza A/B    Type 2 diabetes mellitus without complication, unspecified whether long term insulin use  -     POCT Glucose, Hand-Held Device    Acute non-recurrent sinusitis, unspecified location    Other orders  -     cetirizine (ZYRTEC) 10 MG tablet; Take 1 tablet (10 mg total) by mouth once daily.  Dispense: 30 tablet; Refill: 0  -     fluticasone propionate (FLONASE) 50 mcg/actuation nasal spray; 2 sprays (100 mcg total) by Each Nostril route once daily.  Dispense: 15.8 mL; Refill: 0  -     dexchlorphen-phenylephrine-DM (POLYTUSSIN DM) 1-5-10 mg/5 mL Syrp; Take 5 mLs by mouth every 4 (four) hours as needed.  Dispense: 120 mL; Refill: 0  -     doxycycline (VIBRAMYCIN) 100 MG Cap; Take 1 capsule (100 mg total) by mouth once daily. for 7 days  Dispense: 7 capsule; Refill: 0

## 2020-03-11 ENCOUNTER — HOSPITAL ENCOUNTER (EMERGENCY)
Facility: HOSPITAL | Age: 60
Discharge: HOME | End: 2020-03-11
Attending: EMERGENCY MEDICINE
Payer: MEDICAID

## 2020-03-11 VITALS
OXYGEN SATURATION: 99 % | TEMPERATURE: 98 F | BODY MASS INDEX: 32.1 KG/M2 | DIASTOLIC BLOOD PRESSURE: 92 MMHG | RESPIRATION RATE: 20 BRPM | HEART RATE: 86 BPM | SYSTOLIC BLOOD PRESSURE: 186 MMHG | WEIGHT: 237 LBS | HEIGHT: 72 IN

## 2020-03-11 DIAGNOSIS — R05.9 COUGH: ICD-10-CM

## 2020-03-11 DIAGNOSIS — J20.9 ACUTE BRONCHITIS, UNSPECIFIED ORGANISM: Primary | ICD-10-CM

## 2020-03-11 LAB
CTP QC/QA: YES
POC MOLECULAR INFLUENZA A AGN: NEGATIVE
POC MOLECULAR INFLUENZA B AGN: NEGATIVE

## 2020-03-11 PROCEDURE — 99284 EMERGENCY DEPT VISIT MOD MDM: CPT | Mod: 25

## 2020-03-11 RX ORDER — AZITHROMYCIN 250 MG/1
250 TABLET, FILM COATED ORAL DAILY
Qty: 6 TABLET | Refills: 0 | Status: SHIPPED | OUTPATIENT
Start: 2020-03-11 | End: 2020-03-11 | Stop reason: SDUPTHER

## 2020-03-11 RX ORDER — AZITHROMYCIN 250 MG/1
250 TABLET, FILM COATED ORAL DAILY
Qty: 6 TABLET | Refills: 0 | Status: SHIPPED | OUTPATIENT
Start: 2020-03-11 | End: 2020-09-14

## 2020-03-12 NOTE — ED NOTES
Patient brought to triage 2 to see ED MD.  All HCW wore appropriate PPE; patient also had surgical mask on during transaction.

## 2020-03-12 NOTE — ED PROVIDER NOTES
Encounter Date: 3/11/2020       History     Chief Complaint   Patient presents with    Cough     c/o cough in am x few weeks. productive yellow. no fever. covid19 screen neg     Patient presents complaining of cough and congestion intermittent fever and chills.  Patient started with symptoms 2 weeks ago with prescribed doxycycline and had improvement.  Symptoms returned patient reports he last had fever about 3 days ago.  He reports no recent travel.  He has had no corona virus exposures.  He has not had fever today.  He does have productive yellow sputum and body aches.  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        Review of patient's allergies indicates:   Allergen Reactions    Pcn [penicillins]      No past medical history on file.  No past surgical history on file.  Family History   Problem Relation Age of Onset    No Known Problems Mother     No Known Problems Father      Social History     Tobacco Use    Smoking status: Never Smoker   Substance Use Topics    Alcohol use: No     Frequency: Never    Drug use: No     Review of Systems   Constitutional: Positive for fever.   Respiratory: Positive for cough.    All other systems reviewed and are negative.      Physical Exam     Initial Vitals [03/11/20 1559]   BP Pulse Resp Temp SpO2   (!) 166/97 87 19 98.3 °F (36.8 °C) (!) 94 %      MAP       --         Physical Exam    Nursing note and vitals reviewed.  Constitutional: He appears well-developed and well-nourished. He is not diaphoretic. No distress.   HENT:   Head: Normocephalic and atraumatic.   Eyes: EOM are normal.   Neck: Normal range of motion. Neck supple.   Cardiovascular: Normal rate, regular rhythm, normal heart sounds and intact distal pulses.   Pulmonary/Chest: Breath sounds normal. No respiratory distress.   Musculoskeletal: Normal range of motion.   Neurological: He is alert and oriented to person, place, and time.   Skin: Skin is  warm and dry.   Psychiatric: He has a normal mood and affect. His behavior is normal. Judgment and thought content normal.         ED Course   Procedures  Labs Reviewed   POCT INFLUENZA A/B MOLECULAR          Imaging Results          X-Ray Chest PA And Lateral (Final result)  Result time 03/11/20 17:58:40    Final result by Donnie Zendejas MD (03/11/20 17:58:40)                 Impression:      No acute pulmonary process.      Electronically signed by: Donnie Zendejas MD  Date:    03/11/2020  Time:    17:58             Narrative:    EXAMINATION:  XR CHEST PA AND LATERAL    CLINICAL HISTORY:  Cough    COMPARISON:  None available    FINDINGS:  Cardiac silhouette size is within normal limits.  No airspace consolidation.  No pleural effusion or pneumothorax.  No acute osseous abnormality.                                 Medical Decision Making:   ED Management:  PPE used.    Patient Masked.    Very low risk    Liya also in the room in ppe    Patient's chest x-ray is clear flu test is negative.  Patient is afebrile in the emergency department.  He was advised to take azithromycin to use hand hygiene and to limit exposures to others during this cough illness.  He is very low suspicion for any concern for corona virus.  Patient be discharged stable condition.  Detailed return precautions discussed.                                 Clinical Impression:       ICD-10-CM ICD-9-CM   1. Acute bronchitis, unspecified organism J20.9 466.0   2. Cough R05 786.2             ED Disposition Condition    Discharge Stable        ED Prescriptions     None        Follow-up Information     Follow up With Specialties Details Why Contact Info    Aiden Otero Jr., MD Family Medicine In 1 week  [address] [place] [id number]  [phone]                                       Jhony Hernandez MD  03/11/20 1656

## 2020-09-04 ENCOUNTER — OFFICE VISIT (OUTPATIENT)
Dept: PRIMARY CARE CLINIC | Facility: CLINIC | Age: 60
End: 2020-09-04
Payer: MEDICAID

## 2020-09-04 DIAGNOSIS — Z20.822 EXPOSURE TO COVID-19 VIRUS: ICD-10-CM

## 2020-09-04 PROCEDURE — 99203 PR OFFICE/OUTPT VISIT, NEW, LEVL III, 30-44 MIN: ICD-10-PCS | Mod: S$GLB,,, | Performed by: NURSE PRACTITIONER

## 2020-09-04 PROCEDURE — 99203 OFFICE O/P NEW LOW 30 MIN: CPT | Mod: S$GLB,,, | Performed by: NURSE PRACTITIONER

## 2020-09-04 NOTE — PROGRESS NOTES
Subjective:        Time seen by provider: 1:57 PM on 09/04/2020    Gagan Escobar is a 60 y.o. male who presents for an evaluation of possible COVID-19. He is asymptomatic but was exposed to his girlfriend's daughter and granddaughter, who recently received positive results. The patient denies fever, fatigue, congestion, cough, body aches, or any other symptoms at this time. No pulmonary PMHx or PSHx.     Review of Systems   Constitutional: Negative for activity change, appetite change, fatigue and fever.   HENT: Negative for congestion, rhinorrhea and sore throat.    Respiratory: Negative for cough, chest tightness, shortness of breath and wheezing.    Cardiovascular: Negative for chest pain and palpitations.   Gastrointestinal: Negative for diarrhea, nausea and vomiting.   Musculoskeletal: Negative for arthralgias and myalgias.   Skin: Negative for rash.   Neurological: Negative for weakness, light-headedness and headaches.       Objective:      Physical Exam  Vitals signs and nursing note reviewed.   Constitutional:       General: He is not in acute distress.     Appearance: He is well-developed. He is not diaphoretic.   HENT:      Head: Normocephalic and atraumatic.      Nose: Nose normal.   Eyes:      Conjunctiva/sclera: Conjunctivae normal.   Neck:      Musculoskeletal: Normal range of motion.   Cardiovascular:      Rate and Rhythm: Normal rate and regular rhythm.      Heart sounds: Normal heart sounds. No murmur.   Pulmonary:      Effort: Pulmonary effort is normal. No respiratory distress.      Breath sounds: Normal breath sounds. No wheezing.   Musculoskeletal: Normal range of motion.   Skin:     General: Skin is warm and dry.   Neurological:      Mental Status: He is alert and oriented to person, place, and time.         Assessment:       1. .Exposure to COVID-19 Virus  Plan:       1. Exposure to COVID-19 Virus  Asymptomatic. No need for testing at this time. Quarantine precautions discussed.   2.  Discharge home and await results.   3. Return to clinic or ED for new or worsening symptoms.   4. Follow-up with PCP as needed.     Scribe Attestation:   I, Aminta Kate, am scribing for, and in the presence of, BEV Salinas. I performed the above scribed service and the documentation accurately describes the services I performed. I attest to the accuracy of the note.    I, BEV Salinas, personally performed the services described in this documentation. All medical record entries made by the scribe were at my direction and in my presence.  I have reviewed the chart and agree that the record reflects my personal performance and is accurate and complete. BEV Salinas.  3:08 PM 09/04/2020

## 2020-09-14 ENCOUNTER — OFFICE VISIT (OUTPATIENT)
Dept: FAMILY MEDICINE | Facility: CLINIC | Age: 60
End: 2020-09-14
Payer: MEDICAID

## 2020-09-14 VITALS
TEMPERATURE: 97 F | BODY MASS INDEX: 30.02 KG/M2 | OXYGEN SATURATION: 99 % | WEIGHT: 221.63 LBS | DIASTOLIC BLOOD PRESSURE: 100 MMHG | HEIGHT: 72 IN | HEART RATE: 97 BPM | SYSTOLIC BLOOD PRESSURE: 150 MMHG

## 2020-09-14 DIAGNOSIS — Z12.5 SCREENING PSA (PROSTATE SPECIFIC ANTIGEN): ICD-10-CM

## 2020-09-14 DIAGNOSIS — Z11.59 SCREENING FOR VIRAL DISEASE: ICD-10-CM

## 2020-09-14 DIAGNOSIS — E78.2 MIXED HYPERLIPIDEMIA: ICD-10-CM

## 2020-09-14 DIAGNOSIS — I10 ESSENTIAL HYPERTENSION: ICD-10-CM

## 2020-09-14 DIAGNOSIS — E11.65 TYPE 2 DIABETES MELLITUS WITH HYPERGLYCEMIA, WITHOUT LONG-TERM CURRENT USE OF INSULIN: Primary | ICD-10-CM

## 2020-09-14 PROCEDURE — 99204 PR OFFICE/OUTPT VISIT, NEW, LEVL IV, 45-59 MIN: ICD-10-PCS | Mod: S$PBB,,, | Performed by: NURSE PRACTITIONER

## 2020-09-14 PROCEDURE — 99204 OFFICE O/P NEW MOD 45 MIN: CPT | Mod: S$PBB,,, | Performed by: NURSE PRACTITIONER

## 2020-09-14 PROCEDURE — 99215 OFFICE O/P EST HI 40 MIN: CPT | Performed by: NURSE PRACTITIONER

## 2020-09-14 RX ORDER — SILDENAFIL 100 MG/1
TABLET, FILM COATED ORAL
COMMUNITY
End: 2021-02-18 | Stop reason: SDUPTHER

## 2020-09-14 RX ORDER — POTASSIUM CHLORIDE 750 MG/1
TABLET, EXTENDED RELEASE ORAL
COMMUNITY
End: 2020-09-14

## 2020-09-14 RX ORDER — VALSARTAN 160 MG/1
TABLET ORAL
COMMUNITY
End: 2021-04-06 | Stop reason: SDUPTHER

## 2020-09-14 RX ORDER — NYSTATIN 100000 U/G
CREAM TOPICAL
COMMUNITY
End: 2020-09-14

## 2020-09-14 RX ORDER — CLOTRIMAZOLE AND BETAMETHASONE DIPROPIONATE 10; .64 MG/G; MG/G
CREAM TOPICAL
COMMUNITY
End: 2020-09-14

## 2020-09-14 RX ORDER — METFORMIN HYDROCHLORIDE EXTENDED-RELEASE TABLETS 500 MG/1
500 TABLET, FILM COATED, EXTENDED RELEASE ORAL
Qty: 30 TABLET | Refills: 2 | Status: SHIPPED | OUTPATIENT
Start: 2020-09-14 | End: 2021-01-06

## 2020-09-14 RX ORDER — AMMONIUM LACTATE 12 G/100G
LOTION TOPICAL
COMMUNITY
End: 2020-09-14

## 2020-09-14 RX ORDER — KETOCONAZOLE 20 MG/G
CREAM TOPICAL
COMMUNITY
End: 2020-09-14

## 2020-09-14 RX ORDER — PEN NEEDLE, DIABETIC 30 GX3/16"
NEEDLE, DISPOSABLE MISCELLANEOUS
COMMUNITY
End: 2020-09-14

## 2020-09-14 RX ORDER — OMEPRAZOLE 40 MG/1
CAPSULE, DELAYED RELEASE ORAL
COMMUNITY
End: 2020-09-14

## 2020-09-14 RX ORDER — INSULIN GLARGINE 100 [IU]/ML
INJECTION, SOLUTION SUBCUTANEOUS
COMMUNITY
End: 2020-09-14

## 2020-09-14 NOTE — PATIENT INSTRUCTIONS
Understanding Carbohydrates, Fats, and Protein  Food is a source of fuel and nourishment for your body. Its also a source of pleasure. Having diabetes doesnt mean you have to eat special foods or give up desserts. Instead, your dietitian can show you how to plan meals to suit your body. To start, learn how different foods affect blood sugar.  Carbohydrates  Carbohydrates are the main source of fuel for the body. Carbohydrates raise blood sugar. Many people think carbohydrates are only found in pasta or bread. But carbohydrates are actually in many kinds of foods:  · Sugars occur naturally in foods such as fruit, milk, honey, and molasses. Sugars can also be added to many foods, from cereals and yogurt to candy and desserts. Sugars raise blood sugar.  · Starches are found in bread, cereals, pasta, and dried beans. Theyre also found in corn, peas, potatoes, yam, acorn squash, and butternut squash. Starches also raise blood sugar.   · Fiber is found in foods such as vegetables, fruits, beans, and whole grains. Unlike other carbs, fiber isnt digested or absorbed. So it doesnt raise blood sugar. In fact, fiber can help keep blood sugar from rising too fast. It also helps keep blood cholesterol at a healthy level.  Did you know?  Even though carbohydrates raise blood sugar, its best to have some in every meal. They are an important part of a healthy diet.   Fat  Fat is an energy source that can be stored until needed. Fat does not raise blood sugar. However, it can raise blood cholesterol, increasing the risk of heart disease. Fat is also high in calories, which can cause weight gain. Not all types of fat are the same.  More Healthy:  · Monounsaturated fats are mostly found in vegetable oils, such as olive, canola, and peanut oils. They are also found in avocados and some nuts. Monounsaturated fats are healthy for your heart. Thats because they lower LDL (unhealthy) cholesterol.  · Polyunsaturated fats are mostly  found in vegetable oils, such as corn, safflower, and soybean oils. They are also found in some seeds, nuts, and fish. Polyunsaturated fats lower LDL (unhealthy) cholesterol. So, choosing them instead of saturated fats is healthy for your heart. Certain unsaturated fats can help lower triglycerides.   Less Healthy:  · Saturated fats are found in animal products, such as meat, poultry, whole milk, lard, and butter. Saturated fats raise LDL cholesterol and are not healthy for your heart.  · Hydrogenated oils and trans fats are formed when vegetable oils are processed into solid fats. They are found in many processed foods. Hydrogenated oils and trans fats raise LDL cholesterol and lower HDL (healthy) cholesterol. They are not healthy for your heart.  Protein  Protein helps the body build and repair muscle and other tissue. Protein has little or no effect on blood sugar. However, many foods that contain protein also contain saturated fat. By choosing low-fat protein sources, you can get the benefits of protein without the extra fat:  · Plant protein is found in dry beans and peas, nuts, and soy products, such as tofu and soymilk. These sources tend to be cholesterol-free and low in saturated fat.  · Animal protein is found in fish, poultry, meat, cheese, milk, and eggs. These contain cholesterol and can be high in saturated fat. Aim for lean, lower-fat choices.  Date Last Reviewed: 3/1/2016  © 2603-0882 [employer]. [address] [place] [id number]. All rights reserved. This information is not intended as a substitute for professional medical care. Always follow your healthcare professional's instructions.        Diet: Diabetes  Food is an important tool that you can use to control diabetes and stay healthy. Eating well-balanced meals in the correct amounts will help you control your blood glucose levels and prevent low blood sugar reactions. It will also help you reduce the health risks of  diabetes. There is no one specific diet that is right for everyone with diabetes. But there are general guidelines to follow. A registered dietitian (RD) will create a tailored diet approach thats just right for you. He or she will also help you plan healthy meals and snacks. If you have any questions, call your dietitian for advice.     Guidelines for success  Talk with your healthcare provider before starting a diabetes diet or weight loss program. If you haven't talked with a dietitian yet, ask your provider for a referral. The following guidelines can help you succeed:  · Select foods from the 6 food groups below. Your dietitian will help you find food choices within each group. He or she will also show you serving sizes and how many servings you can have at each meal.  ¨ Grains, beans, and starchy vegetables  ¨ Vegetables  ¨ Fruit  ¨ Milk or yogurt  ¨ Meat, poultry, fish, or tofu  ¨ Healthy fats  · Check your blood sugar levels as directed by your provider. Take any medicine as prescribed by your provider.  · Learn to read food labels and pick the right portion sizes.  · Eat only the amount of food in your meal plan. Eat about the same amount of food at regular times each day. Dont skip meals. Eat meals 4 to 5 hours apart, with snacks in between.  · Limit alcohol. It raises blood sugar levels. Drink water or calorie-free diet drinks that use safe sweeteners.  · Eat less fat to help lower your risk of heart disease. Use nonfat or low-fat dairy products and lean meats. Avoid fried foods. Use cooking oils that are unsaturated, such as olive, canola, or peanut oil.  · Talk with your dietitian about safe sugar substitutes.  · Avoid added salt. It can contribute to high blood pressure, which can cause heart disease. People with diabetes already have a risk of high blood pressure and heart disease.  · Stay at a healthy weight. If you need to lose weight, cut down on your portion sizes. But dont skip meals. Exercise  is an important part of any weight management program. Talk with your provider about an exercise program thats right for you.  · For more information about the best diet plan for you, talk with a registered dietitian (RD). To find an RD in your area, contact:  ¨ Academy of Nutrition and Dietetics www.eatright.org  ¨ The American Diabetes Association [phone] www.diabetes.org  Date Last Reviewed: 8/1/2016 © 2000-2017 [employer]. [address] [place] [address], [place] [address]. All rights reserved. This information is not intended as a substitute for professional medical care. Always follow your healthcare professional's instructions.

## 2020-09-14 NOTE — PROGRESS NOTES
SUBJECTIVE:      Patient ID: Gagan Escobar is a 60 y.o. male.    Chief Complaint: Establish Care and Diabetes    New patient here to establish care with me today.  He has a history of hypertension, type 2 diabetes, hyperlipidemia, and erectile dysfunction.  He is transferring care from the [facility] down the street.  His last visit with them was about 6 months ago and his A1c was 11.  He has previously been prescribed metformin IR and had problems of constipation.  He was also on insulin but has stopped this as he was not getting medicine refills.  He is very displeased about how many medications he is taking and does not want to be on this much medication.  He admits he does not watch his diet and he eats 3 meals a day which are heavy and starch/carbs.  He also admits to drinking a lot of juice and sweet tea.  He was hospitalized a few months ago with a blood sugar of over 500.  He has never had any formal diabetes Education and would be interested in this.  He does have a glucometer and is checking his blood sugar on occasion with readings fasting over 200.  He admits he is very thirsty and uses the bathroom frequently.  He stopped all of his blood pressure medications 3-4 days ago as he wants a fresh start with a PCP.    HM:  He did have a eye exam and foot exam this year- will need records    Hypertension  This is a chronic problem. The current episode started more than 1 month ago. The problem has been gradually worsening since onset. The problem is uncontrolled. Pertinent negatives include no blurred vision, chest pain, headaches, palpitations, peripheral edema or shortness of breath. There are no associated agents to hypertension. Risk factors for coronary artery disease include male gender, family history, dyslipidemia, diabetes mellitus, obesity and sedentary lifestyle. Past treatments include nothing. Compliance problems include diet and exercise.  There is no history of angina,  kidney disease, CAD/MI, CVA, heart failure or left ventricular hypertrophy. There is no history of chronic renal disease.   Diabetes  He presents for his follow-up diabetic visit. He has type 2 diabetes mellitus. There are no hypoglycemic associated symptoms. Pertinent negatives for hypoglycemia include no dizziness, headaches, nervousness/anxiousness or pallor. Associated symptoms include polydipsia and polyuria. Pertinent negatives for diabetes include no blurred vision, no chest pain, no fatigue, no foot paresthesias, no foot ulcerations, no visual change, no weakness and no weight loss. Pertinent negatives for diabetic complications include no CVA. Risk factors for coronary artery disease include diabetes mellitus, dyslipidemia, family history, male sex, hypertension, obesity and sedentary lifestyle. Current diabetic treatment includes oral agent (monotherapy). He is compliant with treatment most of the time. He is following a generally unhealthy diet. When asked about meal planning, he reported none. He has not had a previous visit with a dietitian. He rarely participates in exercise. There is no change in his home blood glucose trend. His breakfast blood glucose range is generally >200 mg/dl. He sees a podiatrist.Eye exam is current.   Hyperlipidemia  This is a chronic problem. The current episode started more than 1 month ago. Condition status: need labs. Exacerbating diseases include diabetes and obesity. He has no history of chronic renal disease, hypothyroidism or liver disease. Factors aggravating his hyperlipidemia include fatty foods. Pertinent negatives include no chest pain, leg pain, myalgias or shortness of breath. Current antihyperlipidemic treatment includes statins. Compliance problems include adherence to exercise and adherence to diet.  Risk factors for coronary artery disease include diabetes mellitus, dyslipidemia, family history, hypertension, male sex, obesity and a sedentary lifestyle.        Family History   Problem Relation Age of Onset    Heart disease Mother     Mental illness Mother     Asthma Father       Social History     Socioeconomic History    Marital status:      Spouse name: Not on file    Number of children: Not on file    Years of education: Not on file    Highest education level: Not on file   Occupational History    Not on file   Social Needs    Financial resource strain: Not on file    Food insecurity     Worry: Not on file     Inability: Not on file    Transportation needs     Medical: Not on file     Non-medical: Not on file   Tobacco Use    Smoking status: Never Smoker    Smokeless tobacco: Never Used   Substance and Sexual Activity    Alcohol use: Yes     Frequency: Never    Drug use: No    Sexual activity: Not on file   Lifestyle    Physical activity     Days per week: Not on file     Minutes per session: Not on file    Stress: Not on file   Relationships    Social connections     Talks on phone: Not on file     Gets together: Not on file     Attends Orthodox service: Not on file     Active member of club or organization: Not on file     Attends meetings of clubs or organizations: Not on file     Relationship status: Not on file   Other Topics Concern    Not on file   Social History Narrative    Not on file     Current Outpatient Medications   Medication Sig Dispense Refill    aspirin (ECOTRIN) 81 MG EC tablet Take 81 mg by mouth once daily.      atorvastatin (LIPITOR) 20 MG tablet Take 20 mg by mouth once daily.      cetirizine (ZYRTEC) 10 MG tablet Take 1 tablet (10 mg total) by mouth once daily. 30 tablet 0    glipiZIDE (GLUCOTROL) 5 MG tablet Take 5 mg by mouth 2 (two) times daily before meals.      sildenafiL (VIAGRA) 100 MG tablet Viagra 100 mg tablet   Take 1 tablet every day by oral route as needed for 30 days.      valsartan (DIOVAN) 160 MG tablet valsartan 160 mg tablet   Take 1 tablet every day by oral route for 30 days.       metFORMIN (FORTAMET) 500 mg 24hr tablet Take 1 tablet (500 mg total) by mouth daily with breakfast. 30 tablet 2     No current facility-administered medications for this visit.      Review of patient's allergies indicates:   Allergen Reactions    Ace inhibitors Rash    Pcn [penicillins]       Past Medical History:   Diagnosis Date    Diabetes mellitus type 2 with complications     Hyperlipidemia     Hypertension      Past Surgical History:   Procedure Laterality Date    FOOT SURGERY      NECK SURGERY  02/2020       Review of Systems   Constitutional: Negative for activity change, appetite change, chills, diaphoresis, fatigue, fever, unexpected weight change and weight loss.   HENT: Negative for congestion, ear pain, rhinorrhea and sore throat.    Eyes: Negative for blurred vision, pain and visual disturbance.   Respiratory: Negative for cough, shortness of breath and wheezing.    Cardiovascular: Negative for chest pain, palpitations and leg swelling.   Gastrointestinal: Negative for abdominal pain, constipation, diarrhea, nausea and vomiting.   Endocrine: Positive for polydipsia and polyuria. Negative for cold intolerance and heat intolerance.   Genitourinary: Positive for frequency. Negative for difficulty urinating, dysuria, hematuria and urgency.   Musculoskeletal: Negative for myalgias.   Skin: Negative for pallor, rash and wound.   Neurological: Negative for dizziness, syncope, weakness, numbness and headaches.   Hematological: Negative for adenopathy. Does not bruise/bleed easily.   Psychiatric/Behavioral: Negative for dysphoric mood, sleep disturbance and suicidal ideas. The patient is not nervous/anxious.       OBJECTIVE:      Vitals:    09/14/20 0915   BP: (!) 150/100   BP Location: Left arm   Patient Position: Sitting   BP Method: Large (Manual)   Pulse: 97   Temp: 97.2 °F (36.2 °C)   TempSrc: Temporal   SpO2: 99%   Weight: 100.5 kg (221 lb 9.6 oz)   Height: 6' (1.829 m)     Physical Exam  Vitals  signs and nursing note reviewed.   Constitutional:       General: He is not in acute distress.     Appearance: He is well-developed. He is obese. He is not ill-appearing or diaphoretic.   HENT:      Head: Normocephalic and atraumatic.      Right Ear: Tympanic membrane, ear canal and external ear normal.      Left Ear: Tympanic membrane, ear canal and external ear normal.      Nose: Nose normal.      Mouth/Throat:      Mouth: Mucous membranes are moist.      Pharynx: Oropharynx is clear. No oropharyngeal exudate.   Eyes:      General: No scleral icterus.     Extraocular Movements: Extraocular movements intact.      Conjunctiva/sclera: Conjunctivae normal.      Pupils: Pupils are equal, round, and reactive to light.   Neck:      Musculoskeletal: Normal range of motion and neck supple.      Thyroid: No thyroid mass or thyromegaly.      Trachea: Trachea normal.   Cardiovascular:      Rate and Rhythm: Normal rate and regular rhythm.      Heart sounds: Normal heart sounds. No murmur.   Pulmonary:      Effort: Pulmonary effort is normal. No respiratory distress.      Breath sounds: Normal breath sounds. No wheezing or rales.   Abdominal:      General: Bowel sounds are normal. There is no distension.      Palpations: Abdomen is soft. There is no mass.      Tenderness: There is no abdominal tenderness.   Musculoskeletal: Normal range of motion.   Lymphadenopathy:      Cervical: No cervical adenopathy.   Skin:     General: Skin is warm and dry.      Coloration: Skin is not pale.      Findings: No rash.   Neurological:      Mental Status: He is alert and oriented to person, place, and time.   Psychiatric:         Mood and Affect: Mood normal.         Behavior: Behavior normal.         Thought Content: Thought content normal.         Judgment: Judgment normal.        Assessment:       1. Type 2 diabetes mellitus with hyperglycemia, without long-term current use of insulin    2. Essential hypertension    3. Mixed hyperlipidemia     4. Screening PSA (prostate specific antigen)    5. Screening for viral disease        Plan:       Type 2 diabetes mellitus with hyperglycemia, without long-term current use of insulin  -     Lipid Panel; Future; Expected date: 09/14/2020  -     Comprehensive metabolic panel; Future; Expected date: 09/14/2020  -     Hemoglobin A1C; Future; Expected date: 09/14/2020  -     Microalbumin/creatinine urine ratio; Future; Expected date: 09/14/2020  -     Urinalysis; Future; Expected date: 09/14/2020  -     Ambulatory referral/consult to Nutrition Services; Future; Expected date: 09/21/2020  -     metFORMIN (FORTAMET) 500 mg 24hr tablet; Take 1 tablet (500 mg total) by mouth daily with breakfast.  Dispense: 30 tablet; Refill: 2    *Will start Metformin ER- advised of SE; Risks of DM discussed. Types of treatment discussed. Encouraged exercise and diet. A1C Goal of < 7.0 reviewed. Stay away from sweets and high carb foods such as pasta, rice, bread, etc.  Will adjust/continue medication to achieve optimal glucose and cholesterol control.  Discussed need for annual eye exam, seasonal  flu vaccine seasonally, and routine inspection of feet.  Bring glucose diary to each visit.    Essential hypertension  -     Comprehensive metabolic panel; Future; Expected date: 09/14/2020  -     CBC auto differential; Future; Expected date: 09/14/2020  -     Urinalysis; Future; Expected date: 09/14/2020  -     TSH; Future; Expected date: 09/14/2020    *start ARB daily- recheck in 2-3 weeks   *Lifestyle changes: Reduce the amount of salt in your diet; Lose weight; Avoid drinking too much alcohol; Exercise at least 30 minutes per day most days of the week.  Continue current medications and home BP monitoring.     Mixed hyperlipidemia  -     Lipid Panel; Future; Expected date: 09/14/2020  -     Comprehensive metabolic panel; Future; Expected date: 09/14/2020    *Limit red meat, butter, fried foods, cheese, and other foods that have a lot of  saturated fat. Consume more: lean meats, fish, fruits, vegetables, whole grains, beans, lentils, and nuts.  Weight loss, and 30-45 min of cardiovascular exercise daily.     Screening PSA (prostate specific antigen)  -     PSA, Screening; Future; Expected date: 09/14/2020    Screening for viral disease  -     Hepatitis C Antibody; Future; Expected date: 09/14/2020  -     HIV 1/2 Ag/Ab (4th Gen); Future; Expected date: 09/14/2020        Follow up in about 3 weeks (around 10/5/2020) for diabetes, HTN.      9/14/2020 PRIMO Zuniga, FNP

## 2020-09-16 ENCOUNTER — TELEPHONE (OUTPATIENT)
Dept: FAMILY MEDICINE | Facility: CLINIC | Age: 60
End: 2020-09-16

## 2020-09-16 NOTE — TELEPHONE ENCOUNTER
Metformin  mg osmotic tabs is not covered by insurance and cost over 200 dollars.  Patient can't afford   The alternative is Gluophage  mg   Please consider changing

## 2020-09-16 NOTE — TELEPHONE ENCOUNTER
Spoke with Sandeep at [facility] to inform to change the Osmotic tab to the alternate of Glucaphage. Verbal given read back and verified.

## 2020-09-29 ENCOUNTER — LAB VISIT (OUTPATIENT)
Dept: LAB | Facility: HOSPITAL | Age: 60
End: 2020-09-29
Attending: NURSE PRACTITIONER
Payer: MEDICAID

## 2020-09-29 DIAGNOSIS — E11.65 TYPE 2 DIABETES MELLITUS WITH HYPERGLYCEMIA, WITHOUT LONG-TERM CURRENT USE OF INSULIN: ICD-10-CM

## 2020-09-29 DIAGNOSIS — Z12.5 SCREENING PSA (PROSTATE SPECIFIC ANTIGEN): ICD-10-CM

## 2020-09-29 DIAGNOSIS — E78.2 MIXED HYPERLIPIDEMIA: ICD-10-CM

## 2020-09-29 DIAGNOSIS — I10 ESSENTIAL HYPERTENSION: ICD-10-CM

## 2020-09-29 DIAGNOSIS — Z11.59 SCREENING FOR VIRAL DISEASE: ICD-10-CM

## 2020-09-29 LAB
ALBUMIN SERPL BCP-MCNC: 4.1 G/DL (ref 3.5–5.2)
ALBUMIN/CREAT UR: 13.2 UG/MG (ref 0–30)
ALP SERPL-CCNC: 66 U/L (ref 55–135)
ALT SERPL W/O P-5'-P-CCNC: 24 U/L (ref 10–44)
ANION GAP SERPL CALC-SCNC: 12 MMOL/L (ref 8–16)
AST SERPL-CCNC: 17 U/L (ref 10–40)
BACTERIA #/AREA URNS HPF: NEGATIVE /HPF
BASOPHILS # BLD AUTO: 0.05 K/UL (ref 0–0.2)
BASOPHILS NFR BLD: 0.7 % (ref 0–1.9)
BILIRUB SERPL-MCNC: 1 MG/DL (ref 0.1–1)
BILIRUB UR QL STRIP: NEGATIVE
BUN SERPL-MCNC: 12 MG/DL (ref 6–20)
CALCIUM SERPL-MCNC: 9.7 MG/DL (ref 8.7–10.5)
CHLORIDE SERPL-SCNC: 99 MMOL/L (ref 95–110)
CHOLEST SERPL-MCNC: 273 MG/DL (ref 120–199)
CHOLEST/HDLC SERPL: 6.3 {RATIO} (ref 2–5)
CLARITY UR: CLEAR
CO2 SERPL-SCNC: 24 MMOL/L (ref 23–29)
COLOR UR: YELLOW
COMPLEXED PSA SERPL-MCNC: 2.1 NG/ML (ref 0–4)
CREAT SERPL-MCNC: 0.8 MG/DL (ref 0.5–1.4)
CREAT UR-MCNC: 109 MG/DL (ref 23–375)
DIFFERENTIAL METHOD: NORMAL
EOSINOPHIL # BLD AUTO: 0.1 K/UL (ref 0–0.5)
EOSINOPHIL NFR BLD: 1.6 % (ref 0–8)
ERYTHROCYTE [DISTWIDTH] IN BLOOD BY AUTOMATED COUNT: 12.5 % (ref 11.5–14.5)
EST. GFR  (AFRICAN AMERICAN): >60 ML/MIN/1.73 M^2
EST. GFR  (NON AFRICAN AMERICAN): >60 ML/MIN/1.73 M^2
ESTIMATED AVG GLUCOSE: 335 MG/DL (ref 68–131)
GLUCOSE SERPL-MCNC: 351 MG/DL (ref 70–110)
GLUCOSE UR QL STRIP: ABNORMAL
HBA1C MFR BLD HPLC: 13.3 % (ref 4.5–6.2)
HCT VFR BLD AUTO: 45.4 % (ref 40–54)
HDLC SERPL-MCNC: 43 MG/DL (ref 40–75)
HDLC SERPL: 15.8 % (ref 20–50)
HGB BLD-MCNC: 15.5 G/DL (ref 14–18)
HGB UR QL STRIP: NEGATIVE
HYALINE CASTS #/AREA URNS LPF: 2 /LPF
IMM GRANULOCYTES # BLD AUTO: 0.02 K/UL (ref 0–0.04)
IMM GRANULOCYTES NFR BLD AUTO: 0.3 % (ref 0–0.5)
KETONES UR QL STRIP: ABNORMAL
LDLC SERPL CALC-MCNC: 176.4 MG/DL (ref 63–159)
LEUKOCYTE ESTERASE UR QL STRIP: NEGATIVE
LYMPHOCYTES # BLD AUTO: 2.1 K/UL (ref 1–4.8)
LYMPHOCYTES NFR BLD: 30.8 % (ref 18–48)
MCH RBC QN AUTO: 28.9 PG (ref 27–31)
MCHC RBC AUTO-ENTMCNC: 34.1 G/DL (ref 32–36)
MCV RBC AUTO: 85 FL (ref 82–98)
MICROALBUMIN UR DL<=1MG/L-MCNC: 14.4 UG/ML
MICROSCOPIC COMMENT: ABNORMAL
MONOCYTES # BLD AUTO: 0.6 K/UL (ref 0.3–1)
MONOCYTES NFR BLD: 8.4 % (ref 4–15)
NEUTROPHILS # BLD AUTO: 3.9 K/UL (ref 1.8–7.7)
NEUTROPHILS NFR BLD: 58.2 % (ref 38–73)
NITRITE UR QL STRIP: NEGATIVE
NONHDLC SERPL-MCNC: 230 MG/DL
NRBC BLD-RTO: 0 /100 WBC
PH UR STRIP: 6 [PH] (ref 5–8)
PLATELET # BLD AUTO: 314 K/UL (ref 150–350)
PMV BLD AUTO: 10.2 FL (ref 9.2–12.9)
POTASSIUM SERPL-SCNC: 4.3 MMOL/L (ref 3.5–5.1)
PROT SERPL-MCNC: 7.2 G/DL (ref 6–8.4)
PROT UR QL STRIP: NEGATIVE
RBC # BLD AUTO: 5.37 M/UL (ref 4.6–6.2)
RBC #/AREA URNS HPF: 1 /HPF (ref 0–4)
SODIUM SERPL-SCNC: 135 MMOL/L (ref 136–145)
SP GR UR STRIP: >1.03 (ref 1–1.03)
SQUAMOUS #/AREA URNS HPF: 1 /HPF
TRIGL SERPL-MCNC: 268 MG/DL (ref 30–150)
TSH SERPL DL<=0.005 MIU/L-ACNC: 5.27 UIU/ML (ref 0.34–5.6)
URN SPEC COLLECT METH UR: ABNORMAL
UROBILINOGEN UR STRIP-ACNC: NEGATIVE EU/DL
WBC # BLD AUTO: 6.75 K/UL (ref 3.9–12.7)
WBC #/AREA URNS HPF: 5 /HPF (ref 0–5)
YEAST URNS QL MICRO: ABNORMAL

## 2020-09-29 PROCEDURE — 81001 URINALYSIS AUTO W/SCOPE: CPT

## 2020-09-29 PROCEDURE — 87389 HIV-1 AG W/HIV-1&-2 AB AG IA: CPT

## 2020-09-29 PROCEDURE — 84153 ASSAY OF PSA TOTAL: CPT

## 2020-09-29 PROCEDURE — 84443 ASSAY THYROID STIM HORMONE: CPT

## 2020-09-29 PROCEDURE — 83036 HEMOGLOBIN GLYCOSYLATED A1C: CPT

## 2020-09-29 PROCEDURE — 86803 HEPATITIS C AB TEST: CPT

## 2020-09-29 PROCEDURE — 85025 COMPLETE CBC W/AUTO DIFF WBC: CPT

## 2020-09-29 PROCEDURE — 80053 COMPREHEN METABOLIC PANEL: CPT

## 2020-09-29 PROCEDURE — 36415 COLL VENOUS BLD VENIPUNCTURE: CPT

## 2020-09-29 PROCEDURE — 80061 LIPID PANEL: CPT

## 2020-09-29 PROCEDURE — 82043 UR ALBUMIN QUANTITATIVE: CPT

## 2020-09-30 LAB
HCV AB S/CO SERPL IA: <0.1 S/CO RATIO (ref 0–0.9)
HIV 1+2 AB+HIV1 P24 AG SERPL QL IA: NON REACTIVE

## 2020-10-01 ENCOUNTER — TELEPHONE (OUTPATIENT)
Dept: FAMILY MEDICINE | Facility: CLINIC | Age: 60
End: 2020-10-01

## 2020-10-01 NOTE — TELEPHONE ENCOUNTER
----- Message from MARCOS Duncan sent at 9/30/2020  2:20 PM CDT -----  Please notify patient that his results are abnormal- glucose improving but still high; cholesterol is high as well; follow up for discussion

## 2020-10-01 NOTE — TELEPHONE ENCOUNTER
Left msg with pt of abnormal lab results. NP will go into further detail at upcoming appt next week.

## 2020-10-09 ENCOUNTER — OFFICE VISIT (OUTPATIENT)
Dept: FAMILY MEDICINE | Facility: CLINIC | Age: 60
End: 2020-10-09
Payer: MEDICAID

## 2020-10-09 VITALS
OXYGEN SATURATION: 96 % | HEIGHT: 72 IN | BODY MASS INDEX: 29.87 KG/M2 | RESPIRATION RATE: 16 BRPM | DIASTOLIC BLOOD PRESSURE: 82 MMHG | TEMPERATURE: 98 F | WEIGHT: 220.5 LBS | SYSTOLIC BLOOD PRESSURE: 124 MMHG | HEART RATE: 90 BPM

## 2020-10-09 DIAGNOSIS — E11.65 TYPE 2 DIABETES MELLITUS WITH HYPERGLYCEMIA, WITHOUT LONG-TERM CURRENT USE OF INSULIN: Primary | ICD-10-CM

## 2020-10-09 DIAGNOSIS — I10 ESSENTIAL HYPERTENSION: ICD-10-CM

## 2020-10-09 DIAGNOSIS — E78.2 MIXED HYPERLIPIDEMIA: ICD-10-CM

## 2020-10-09 DIAGNOSIS — Z23 IMMUNIZATION DUE: ICD-10-CM

## 2020-10-09 PROCEDURE — 99214 OFFICE O/P EST MOD 30 MIN: CPT | Mod: S$PBB,,, | Performed by: NURSE PRACTITIONER

## 2020-10-09 PROCEDURE — 90686 IIV4 VACC NO PRSV 0.5 ML IM: CPT | Mod: PBBFAC | Performed by: NURSE PRACTITIONER

## 2020-10-09 PROCEDURE — 99214 PR OFFICE/OUTPT VISIT, EST, LEVL IV, 30-39 MIN: ICD-10-PCS | Mod: S$PBB,,, | Performed by: NURSE PRACTITIONER

## 2020-10-09 PROCEDURE — 99215 OFFICE O/P EST HI 40 MIN: CPT | Performed by: NURSE PRACTITIONER

## 2020-10-09 RX ORDER — INSULIN GLARGINE 100 [IU]/ML
15 INJECTION, SOLUTION SUBCUTANEOUS NIGHTLY
Qty: 2 SYRINGE | Refills: 2 | Status: SHIPPED | OUTPATIENT
Start: 2020-10-09 | End: 2020-11-13

## 2020-10-09 RX ORDER — PEN NEEDLE, DIABETIC 30 GX3/16"
NEEDLE, DISPOSABLE MISCELLANEOUS
Qty: 50 EACH | Refills: 5 | Status: SHIPPED | OUTPATIENT
Start: 2020-10-09 | End: 2021-05-19 | Stop reason: SDUPTHER

## 2020-10-09 RX ORDER — ATORVASTATIN CALCIUM 20 MG/1
20 TABLET, FILM COATED ORAL NIGHTLY
Qty: 90 TABLET | Refills: 1 | Status: SHIPPED | OUTPATIENT
Start: 2020-10-09 | End: 2021-04-06 | Stop reason: SDUPTHER

## 2020-10-09 RX ORDER — INSULIN PUMP SYRINGE, 3 ML
EACH MISCELLANEOUS
Qty: 1 EACH | Refills: 0 | Status: SHIPPED | OUTPATIENT
Start: 2020-10-09 | End: 2021-05-19 | Stop reason: SDUPTHER

## 2020-10-09 RX ORDER — LANCETS
EACH MISCELLANEOUS
Qty: 50 EACH | Refills: 5 | Status: SHIPPED | OUTPATIENT
Start: 2020-10-09 | End: 2021-02-04 | Stop reason: SDUPTHER

## 2020-10-09 NOTE — PATIENT INSTRUCTIONS
Understanding Carbohydrates, Fats, and Protein  Food is a source of fuel and nourishment for your body. Its also a source of pleasure. Having diabetes doesnt mean you have to eat special foods or give up desserts. Instead, your dietitian can show you how to plan meals to suit your body. To start, learn how different foods affect blood sugar.  Carbohydrates  Carbohydrates are the main source of fuel for the body. Carbohydrates raise blood sugar. Many people think carbohydrates are only found in pasta or bread. But carbohydrates are actually in many kinds of foods:  · Sugars occur naturally in foods such as fruit, milk, honey, and molasses. Sugars can also be added to many foods, from cereals and yogurt to candy and desserts. Sugars raise blood sugar.  · Starches are found in bread, cereals, pasta, and dried beans. Theyre also found in corn, peas, potatoes, yam, acorn squash, and butternut squash. Starches also raise blood sugar.   · Fiber is found in foods such as vegetables, fruits, beans, and whole grains. Unlike other carbs, fiber isnt digested or absorbed. So it doesnt raise blood sugar. In fact, fiber can help keep blood sugar from rising too fast. It also helps keep blood cholesterol at a healthy level.  Did you know?  Even though carbohydrates raise blood sugar, its best to have some in every meal. They are an important part of a healthy diet.   Fat  Fat is an energy source that can be stored until needed. Fat does not raise blood sugar. However, it can raise blood cholesterol, increasing the risk of heart disease. Fat is also high in calories, which can cause weight gain. Not all types of fat are the same.  More Healthy:  · Monounsaturated fats are mostly found in vegetable oils, such as olive, canola, and peanut oils. They are also found in avocados and some nuts. Monounsaturated fats are healthy for your heart. Thats because they lower LDL (unhealthy) cholesterol.  · Polyunsaturated fats are mostly  found in vegetable oils, such as corn, safflower, and soybean oils. They are also found in some seeds, nuts, and fish. Polyunsaturated fats lower LDL (unhealthy) cholesterol. So, choosing them instead of saturated fats is healthy for your heart. Certain unsaturated fats can help lower triglycerides.   Less Healthy:  · Saturated fats are found in animal products, such as meat, poultry, whole milk, lard, and butter. Saturated fats raise LDL cholesterol and are not healthy for your heart.  · Hydrogenated oils and trans fats are formed when vegetable oils are processed into solid fats. They are found in many processed foods. Hydrogenated oils and trans fats raise LDL cholesterol and lower HDL (healthy) cholesterol. They are not healthy for your heart.  Protein  Protein helps the body build and repair muscle and other tissue. Protein has little or no effect on blood sugar. However, many foods that contain protein also contain saturated fat. By choosing low-fat protein sources, you can get the benefits of protein without the extra fat:  · Plant protein is found in dry beans and peas, nuts, and soy products, such as tofu and soymilk. These sources tend to be cholesterol-free and low in saturated fat.  · Animal protein is found in fish, poultry, meat, cheese, milk, and eggs. These contain cholesterol and can be high in saturated fat. Aim for lean, lower-fat choices.  Date Last Reviewed: 3/1/2016  © 8436-2392 [employer]. [address] [place] [address]. All rights reserved. This information is not intended as a substitute for professional medical care. Always follow your healthcare professional's instructions.        Diet: Diabetes  Food is an important tool that you can use to control diabetes and stay healthy. Eating well-balanced meals in the correct amounts will help you control your blood glucose levels and prevent low blood sugar reactions. It will also help you reduce the health risks of  diabetes. There is no one specific diet that is right for everyone with diabetes. But there are general guidelines to follow. A registered dietitian (RD) will create a tailored diet approach thats just right for you. He or she will also help you plan healthy meals and snacks. If you have any questions, call your dietitian for advice.     Guidelines for success  Talk with your healthcare provider before starting a diabetes diet or weight loss program. If you haven't talked with a dietitian yet, ask your provider for a referral. The following guidelines can help you succeed:  · Select foods from the 6 food groups below. Your dietitian will help you find food choices within each group. He or she will also show you serving sizes and how many servings you can have at each meal.  ¨ Grains, beans, and starchy vegetables  ¨ Vegetables  ¨ Fruit  ¨ Milk or yogurt  ¨ Meat, poultry, fish, or tofu  ¨ Healthy fats  · Check your blood sugar levels as directed by your provider. Take any medicine as prescribed by your provider.  · Learn to read food labels and pick the right portion sizes.  · Eat only the amount of food in your meal plan. Eat about the same amount of food at regular times each day. Dont skip meals. Eat meals 4 to 5 hours apart, with snacks in between.  · Limit alcohol. It raises blood sugar levels. Drink water or calorie-free diet drinks that use safe sweeteners.  · Eat less fat to help lower your risk of heart disease. Use nonfat or low-fat dairy products and lean meats. Avoid fried foods. Use cooking oils that are unsaturated, such as olive, canola, or peanut oil.  · Talk with your dietitian about safe sugar substitutes.  · Avoid added salt. It can contribute to high blood pressure, which can cause heart disease. People with diabetes already have a risk of high blood pressure and heart disease.  · Stay at a healthy weight. If you need to lose weight, cut down on your portion sizes. But dont skip meals. Exercise  is an important part of any weight management program. Talk with your provider about an exercise program thats right for you.  · For more information about the best diet plan for you, talk with a registered dietitian (RD). To find an RD in your area, contact:  ¨ Academy of Nutrition and Dietetics www.eatright.org  ¨ The American Diabetes Association [phone] www.diabetes.org  Date Last Reviewed: 8/1/2016 © 2000-2017 [employer]. [address] [place] [address], [place] [address]. All rights reserved. This information is not intended as a substitute for professional medical care. Always follow your healthcare professional's instructions.

## 2020-10-09 NOTE — PROGRESS NOTES
SUBJECTIVE:      Patient ID: Gagan Escobar is a 60 y.o. male.    Chief Complaint: Hypertension (follow up ) and Diabetes (discuss labs )    Established patient here for follow-up on diabetes, hypertension, and hyperlipidemia, as well as his labs that were drawn recently.  He is taking his medications as prescribed daily without side effects or complaints.  The metformin has caused him no GI side effects.  He does need a new glucometer and supplies as his machine and supplies are too expensive.  His blood pressure is excellent today and he is tolerating the valsartan without any side effects.  He has been dieting strictly and lost 2 lb since his last visit.  He tries to eat low sugar but does sneak some carbs and juice at times.  He was previously prescribed insulin and glipizide and had no problems with these medications.  We discussed his A1c was 13.3 and insulin was recommended in combination with metformin.  He has not gone for his eye exam yet and had to reschedule due to scheduling issues.  We did receive his foot exam notes from Podiatry since his last visit.  He is due for his flu vaccine today.     Lab Visit on 09/29/2020  Cholesterol                                   Date: 09/29/2020  Value: 273*        Ref range: 120 - 199 mg/dL    Status: Final                Comment: The National Cholesterol Education Program (NCEP) has set the  following guidelines (reference ranges) for Cholesterol:  Optimal.....................<200 mg/dL  Borderline High.............200-239 mg/dL  High........................> or = 240 mg/dL    Triglycerides                                 Date: 09/29/2020  Value: 268*        Ref range: 30 - 150 mg/dL     Status: Final                Comment: The National Cholesterol Education Program (NCEP) has set the  following guidelines (reference values) for triglycerides:  Normal......................<150 mg/dL  Borderline High.............150-199  mg/dL  High........................200-499 mg/dL    HDL                                           Date: 09/29/2020  Value: 43          Ref range: 40 - 75 mg/dL      Status: Final                Comment: The National Cholesterol Education Program (NCEP) has set the  following guidelines (reference values) for HDL Cholesterol:  Low...............<40 mg/dL  Optimal...........>60 mg/dL    LDL Cholesterol                               Date: 09/29/2020  Value: 176.4*      Ref range: 63.0 - 159.0 mg/*  Status: Final                Comment: The National Cholesterol Education Program (NCEP) has set the  following guidelines (reference values) for LDL Cholesterol:  Optimal.......................<130 mg/dL  Borderline High...............130-159 mg/dL  High..........................160-189 mg/dL  Very High.....................>190 mg/dL    Hdl/Cholesterol Ratio                         Date: 09/29/2020  Value: 15.8*       Ref range: 20.0 - 50.0 %      Status: Final  Total Cholesterol/HDL Ratio                   Date: 09/29/2020  Value: 6.3*        Ref range: 2.0 - 5.0          Status: Final  Non-HDL Cholesterol                           Date: 09/29/2020  Value: 230         Ref range: mg/dL              Status: Final                Comment: Risk category and Non-HDL cholesterol goals:  Coronary heart disease (CHD)or equivalent (10-year risk of CHD >20%):  Non-HDL cholesterol goal     <130 mg/dL  Two or more CHD risk factors and 10-year risk of CHD <= 20%:  Non-HDL cholesterol goal     <160 mg/dL  0 to 1 CHD risk factor:  Non-HDL cholesterol goal     <190 mg/dL    Sodium                                        Date: 09/29/2020  Value: 135*        Ref range: 136 - 145 mmol/L   Status: Final  Potassium                                     Date: 09/29/2020  Value: 4.3         Ref range: 3.5 - 5.1 mmol/L   Status: Final  Chloride                                      Date: 09/29/2020  Value: 99          Ref range: 95 - 110 mmol/L     Status: Final  CO2                                           Date: 09/29/2020  Value: 24          Ref range: 23 - 29 mmol/L     Status: Final  Glucose                                       Date: 09/29/2020  Value: 351*        Ref range: 70 - 110 mg/dL     Status: Final  BUN, Bld                                      Date: 09/29/2020  Value: 12          Ref range: 6 - 20 mg/dL       Status: Final  Creatinine                                    Date: 09/29/2020  Value: 0.8         Ref range: 0.5 - 1.4 mg/dL    Status: Final  Calcium                                       Date: 09/29/2020  Value: 9.7         Ref range: 8.7 - 10.5 mg/dL   Status: Final  Total Protein                                 Date: 09/29/2020  Value: 7.2         Ref range: 6.0 - 8.4 g/dL     Status: Final  Albumin                                       Date: 09/29/2020  Value: 4.1         Ref range: 3.5 - 5.2 g/dL     Status: Final  Total Bilirubin                               Date: 09/29/2020  Value: 1.0         Ref range: 0.1 - 1.0 mg/dL    Status: Final                Comment: For infants and newborns, interpretation of results should be based  on gestational age, weight and in agreement with clinical  observations.  Premature Infant recommended reference ranges:  Up to 24 hours.............<8.0 mg/dL  Up to 48 hours............<12.0 mg/dL  3-5 days..................<15.0 mg/dL  6-29 days.................<15.0 mg/dL    Alkaline Phosphatase                          Date: 09/29/2020  Value: 66          Ref range: 55 - 135 U/L       Status: Final  AST                                           Date: 09/29/2020  Value: 17          Ref range: 10 - 40 U/L        Status: Final  ALT                                           Date: 09/29/2020  Value: 24          Ref range: 10 - 44 U/L        Status: Final  Anion Gap                                     Date: 09/29/2020  Value: 12          Ref range: 8 - 16 mmol/L      Status: Final  eGFR if                        Date: 09/29/2020  Value: >60.0       Ref range: >60 mL/min/1.73 *  Status: Final  eGFR if non                   Date: 09/29/2020  Value: >60.0       Ref range: >60 mL/min/1.73 *  Status: Final                Comment: Calculation used to obtain the estimated glomerular filtration  rate (eGFR) is the CKD-EPI equation.     WBC                                           Date: 09/29/2020  Value: 6.75        Ref range: 3.90 - 12.70 K/uL  Status: Final  RBC                                           Date: 09/29/2020  Value: 5.37        Ref range: 4.60 - 6.20 M/uL   Status: Final  Hemoglobin                                    Date: 09/29/2020  Value: 15.5        Ref range: 14.0 - 18.0 g/dL   Status: Final  Hematocrit                                    Date: 09/29/2020  Value: 45.4        Ref range: 40.0 - 54.0 %      Status: Final  Mean Corpuscular Volume                       Date: 09/29/2020  Value: 85          Ref range: 82 - 98 fL         Status: Final  Mean Corpuscular Hemoglobin                   Date: 09/29/2020  Value: 28.9        Ref range: 27.0 - 31.0 pg     Status: Final  Mean Corpuscular Hemoglobin Conc              Date: 09/29/2020  Value: 34.1        Ref range: 32.0 - 36.0 g/dL   Status: Final  RDW                                           Date: 09/29/2020  Value: 12.5        Ref range: 11.5 - 14.5 %      Status: Final  Platelets                                     Date: 09/29/2020  Value: 314         Ref range: 150 - 350 K/uL     Status: Final  MPV                                           Date: 09/29/2020  Value: 10.2        Ref range: 9.2 - 12.9 fL      Status: Final  Immature Granulocytes                         Date: 09/29/2020  Value: 0.3         Ref range: 0.0 - 0.5 %        Status: Final  Gran # (ANC)                                  Date: 09/29/2020  Value: 3.9         Ref range: 1.8 - 7.7 K/uL     Status: Final  Immature Grans (Abs)                          Date:  09/29/2020  Value: 0.02        Ref range: 0.00 - 0.04 K/uL   Status: Final                Comment: Mild elevation in immature granulocytes is non specific and   can be seen in a variety of conditions including stress response,   acute inflammation, trauma and pregnancy. Correlation with other   laboratory and clinical findings is essential.    Lymph #                                       Date: 09/29/2020  Value: 2.1         Ref range: 1.0 - 4.8 K/uL     Status: Final  Mono #                                        Date: 09/29/2020  Value: 0.6         Ref range: 0.3 - 1.0 K/uL     Status: Final  Eos #                                         Date: 09/29/2020  Value: 0.1         Ref range: 0.0 - 0.5 K/uL     Status: Final  Baso #                                        Date: 09/29/2020  Value: 0.05        Ref range: 0.00 - 0.20 K/uL   Status: Final  nRBC                                          Date: 09/29/2020  Value: 0           Ref range: 0 /100 WBC         Status: Final  Gran%                                         Date: 09/29/2020  Value: 58.2        Ref range: 38.0 - 73.0 %      Status: Final  Lymph%                                        Date: 09/29/2020  Value: 30.8        Ref range: 18.0 - 48.0 %      Status: Final  Mono%                                         Date: 09/29/2020  Value: 8.4         Ref range: 4.0 - 15.0 %       Status: Final  Eosinophil%                                   Date: 09/29/2020  Value: 1.6         Ref range: 0.0 - 8.0 %        Status: Final  Basophil%                                     Date: 09/29/2020  Value: 0.7         Ref range: 0.0 - 1.9 %        Status: Final  Differential Method                           Date: 09/29/2020  Value: Automated     Status: Final  Hemoglobin A1C                                Date: 09/29/2020  Value: 13.3*       Ref range: 4.5 - 6.2 %        Status: Final                Comment: According to ADA guidelines, hemoglobin A1C <7.0% represents  optimal  control in non-pregnant diabetic patients.  Different  metrics may apply to specific populations.   Standards of Medical Care in Diabetes - 2016.  For the purpose of screening for the presence of diabetes:  <5.7%     Consistent with the absence of diabetes  5.7-6.4%  Consistent with increasing risk for diabetes   (prediabetes)  >or=6.5%  Consistent with diabetes  Currently no consensus exists for use of hemoglobin A1C  for diagnosis of diabetes for children.    Estimated Avg Glucose                         Date: 09/29/2020  Value: 335*        Ref range: 68 - 131 mg/dL     Status: Final  Microalbum.,U,Random                          Date: 09/29/2020  Value: 14.4        Ref range: ug/mL              Status: Final  Creatinine, Random Ur                         Date: 09/29/2020  Value: 109.0       Ref range: 23.0 - 375.0 mg/*  Status: Final                Comment: The random urine reference ranges provided were established   for 24 hour urine collections.  No reference ranges exist for  random urine specimens.  Correlate clinically.    Microalb Creat Ratio                          Date: 09/29/2020  Value: 13.2        Ref range: 0.0 - 30.0 ug/mg   Status: Final  Specimen UA                                   Date: 09/29/2020  Value: Urine, Clean Catch                       Status: Final  Color, UA                                     Date: 09/29/2020  Value: Yellow      Ref range: Yellow, Straw, A*  Status: Final  Appearance, UA                                Date: 09/29/2020  Value: Clear       Ref range: Clear              Status: Final  pH, UA                                        Date: 09/29/2020  Value: 6.0         Ref range: 5.0 - 8.0          Status: Final  Specific [place], UA                          Date: 09/29/2020  Value: >1.030*     Ref range: 1.005 - 1.030      Status: Final  Protein, UA                                   Date: 09/29/2020  Value: Negative    Ref range: Negative           Status: Final                 Comment: Recommend a 24 hour urine protein or a urine   protein/creatinine ratio if globulin induced proteinuria is  clinically suspected.    Glucose, UA                                   Date: 09/29/2020  Value: 4+*         Ref range: Negative           Status: Final  Ketones, UA                                   Date: 09/29/2020  Value: 1+*         Ref range: Negative           Status: Final  Bilirubin (UA)                                Date: 09/29/2020  Value: Negative    Ref range: Negative           Status: Final  Occult Blood UA                               Date: 09/29/2020  Value: Negative    Ref range: Negative           Status: Final  Nitrite, UA                                   Date: 09/29/2020  Value: Negative    Ref range: Negative           Status: Final  Urobilinogen, UA                              Date: 09/29/2020  Value: Negative    Ref range: Negative EU/dL     Status: Final  Leukocytes, UA                                Date: 09/29/2020  Value: Negative    Ref range: Negative           Status: Final  TSH                                           Date: 09/29/2020  Value: 5.270       Ref range: 0.340 - 5.600 uI*  Status: Final  PSA, SCREEN                                   Date: 09/29/2020  Value: 2.1         Ref range: 0.00 - 4.00 ng/mL  Status: Final  Hepatitis C Ab                                Date: 09/29/2020  Value: <0.1        Ref range: 0.0 - 0.9 s/co r*  Status: Final                Comment: Negative:     < 0.8  Indeterminate: 0.8 - 0.9  Positive:     > 0.9  The CDC recommends that a positive HCV antibody result  be followed up with a HCV Nucleic Acid Amplification  test ([id number]).  Performed at:  [facility]  [id number]  : Iain Akers MD, Phone:  [phone]    HIV Screen 4th Generation wRfx                Date: 09/29/2020  Value: Non Reactive                     Ref range: Non Reactive       Status: Final                 Comment: Performed at:  [facility]  [address] [place], [place]  [id number]  : Iain Akers MD, Phone:  [phone]    RBC, UA                                       Date: 09/29/2020  Value: 1           Ref range: 0 - 4 /hpf         Status: Final  WBC, UA                                       Date: 09/29/2020  Value: 5           Ref range: 0 - 5 /hpf         Status: Final  Bacteria                                      Date: 09/29/2020  Value: Negative    Ref range: None-Occ /hpf      Status: Final  Yeast, UA                                     Date: 09/29/2020  Value: None        Ref range: None               Status: Final  Squam Epithel, UA                             Date: 09/29/2020  Value: 1           Ref range: /hpf               Status: Final  Hyaline Casts, UA                             Date: 09/29/2020  Value: 2*          Ref range: 0-1/lpf /lpf       Status: Final  Microscopic Comment                           Date: 09/29/2020  Value: SEE COMMENT   Status: Final                Comment: Other formed elements not mentioned in the report are not   present in the microscopic examination.     ------------)    Hypertension  This is a chronic problem. The current episode started more than 1 month ago. The problem has been gradually improving since onset. The problem is controlled. Pertinent negatives include no anxiety, blurred vision, chest pain, headaches, malaise/fatigue, palpitations, peripheral edema or shortness of breath. There are no associated agents to hypertension. Risk factors for coronary artery disease include male gender, family history, dyslipidemia, diabetes mellitus, obesity and sedentary lifestyle. Past treatments include angiotensin blockers and lifestyle changes. The current treatment provides moderate improvement. Compliance problems include diet and exercise.  There is no history of angina, kidney disease, CAD/MI, CVA, heart failure or left  ventricular hypertrophy. There is no history of chronic renal disease or a thyroid problem.   Diabetes  He presents for his follow-up diabetic visit. He has type 2 diabetes mellitus. There are no hypoglycemic associated symptoms. Pertinent negatives for hypoglycemia include no dizziness, headaches, nervousness/anxiousness or pallor. Associated symptoms include polydipsia and polyuria. Pertinent negatives for diabetes include no blurred vision, no chest pain, no fatigue, no foot paresthesias, no foot ulcerations, no polyphagia, no visual change, no weakness and no weight loss. Symptoms are improving. Pertinent negatives for diabetic complications include no CVA. Risk factors for coronary artery disease include diabetes mellitus, dyslipidemia, family history, male sex, hypertension, obesity and sedentary lifestyle. Current diabetic treatment includes oral agent (monotherapy) and diet. He is compliant with treatment most of the time. His weight is decreasing steadily. He is following a generally unhealthy diet. Meal planning includes avoidance of concentrated sweets. He has not had a previous visit with a dietitian. He rarely participates in exercise. His home blood glucose trend is decreasing steadily. (Fasting between 200-300) An ACE inhibitor/angiotensin II receptor blocker is being taken. He sees a podiatrist (last exam 5/20).Eye exam is current.   Hyperlipidemia  This is a chronic problem. The current episode started more than 1 month ago. The problem is uncontrolled. Exacerbating diseases include diabetes and obesity. He has no history of chronic renal disease, hypothyroidism or liver disease. Factors aggravating his hyperlipidemia include fatty foods. Pertinent negatives include no chest pain, focal weakness, leg pain, myalgias or shortness of breath. Current antihyperlipidemic treatment includes diet change. The current treatment provides no improvement of lipids. Compliance problems include adherence to  exercise and adherence to diet.  Risk factors for coronary artery disease include diabetes mellitus, dyslipidemia, family history, hypertension, male sex, obesity and a sedentary lifestyle.       Family History   Problem Relation Age of Onset    Heart disease Mother     Mental illness Mother     Asthma Father       Social History     Socioeconomic History    Marital status:      Spouse name: Not on file    Number of children: Not on file    Years of education: Not on file    Highest education level: Not on file   Occupational History    Not on file   Social Needs    Financial resource strain: Not on file    Food insecurity     Worry: Not on file     Inability: Not on file    Transportation needs     Medical: Not on file     Non-medical: Not on file   Tobacco Use    Smoking status: Never Smoker    Smokeless tobacco: Never Used   Substance and Sexual Activity    Alcohol use: Yes     Frequency: Never    Drug use: No    Sexual activity: Not on file   Lifestyle    Physical activity     Days per week: Not on file     Minutes per session: Not on file    Stress: Not at all   Relationships    Social connections     Talks on phone: Not on file     Gets together: Not on file     Attends Worship service: Not on file     Active member of club or organization: Not on file     Attends meetings of clubs or organizations: Not on file     Relationship status: Not on file   Other Topics Concern    Not on file   Social History Narrative    Not on file     Current Outpatient Medications   Medication Sig Dispense Refill    aspirin (ECOTRIN) 81 MG EC tablet Take 81 mg by mouth once daily.      atorvastatin (LIPITOR) 20 MG tablet Take 1 tablet (20 mg total) by mouth every evening. 90 tablet 1    insulin (LANTUS SOLOSTAR U-100 INSULIN) glargine 100 units/mL (3mL) SubQ pen Inject 15 Units into the skin every evening. 2 Syringe 2    metFORMIN (FORTAMET) 500 mg 24hr tablet Take 1 tablet (500 mg total) by  "mouth daily with breakfast. 30 tablet 2    pen needle, diabetic (PEN NEEDLE) 31 gauge x 5/16" Ndle Use 1 pen needle daily to administer insulin 50 each 5    sildenafiL (VIAGRA) 100 MG tablet Viagra 100 mg tablet   Take 1 tablet every day by oral route as needed for 30 days.      valsartan (DIOVAN) 160 MG tablet valsartan 160 mg tablet   Take 1 tablet every day by oral route for 30 days.      blood sugar diagnostic Strp To check BG two times daily, to use with insurance preferred meter 50 strip 5    blood-glucose meter kit To check BG two times daily, to use with insurance preferred meter 1 each 0    cetirizine (ZYRTEC) 10 MG tablet Take 1 tablet (10 mg total) by mouth once daily. 30 tablet 0    lancets Misc To check BG two times daily, to use with insurance preferred meter 50 each 5     No current facility-administered medications for this visit.      Review of patient's allergies indicates:   Allergen Reactions    Ace inhibitors Rash    Pcn [penicillins]       Past Medical History:   Diagnosis Date    Diabetes mellitus type 2 with complications     Hyperlipidemia     Hypertension      Past Surgical History:   Procedure Laterality Date    FOOT SURGERY      NECK SURGERY  02/2020       Review of Systems   Constitutional: Negative for activity change, appetite change, chills, diaphoresis, fatigue, fever, malaise/fatigue, unexpected weight change and weight loss.   HENT: Negative for congestion, ear pain, rhinorrhea and sore throat.    Eyes: Negative for blurred vision, pain and visual disturbance.   Respiratory: Negative for cough, shortness of breath and wheezing.    Cardiovascular: Negative for chest pain, palpitations and leg swelling.   Gastrointestinal: Negative for abdominal pain, constipation, diarrhea, nausea and vomiting.   Endocrine: Positive for polydipsia and polyuria. Negative for cold intolerance, heat intolerance and polyphagia.   Genitourinary: Positive for frequency. Negative for " difficulty urinating, dysuria, hematuria and urgency.   Musculoskeletal: Negative for myalgias.   Skin: Negative for pallor, rash and wound.   Neurological: Negative for dizziness, focal weakness, syncope, weakness, numbness and headaches.   Hematological: Negative for adenopathy. Does not bruise/bleed easily.   Psychiatric/Behavioral: Negative for dysphoric mood, sleep disturbance and suicidal ideas. The patient is not nervous/anxious.       OBJECTIVE:      Vitals:    10/09/20 1432   BP: 124/82   BP Location: Left arm   Patient Position: Sitting   BP Method: X-Large (Manual)   Pulse: 90   Resp: 16   Temp: 97.7 °F (36.5 °C)   TempSrc: Temporal   SpO2: 96%   Weight: 100 kg (220 lb 8 oz)   Height: 6' (1.829 m)     Physical Exam  Vitals signs and nursing note reviewed.   Constitutional:       General: He is not in acute distress.     Appearance: He is well-developed. He is obese. He is not ill-appearing or diaphoretic.   HENT:      Head: Normocephalic.      Right Ear: External ear normal.      Left Ear: External ear normal.      Nose: Nose normal.      Mouth/Throat:      Mouth: Mucous membranes are moist.      Pharynx: Oropharynx is clear. No oropharyngeal exudate.   Eyes:      General: No scleral icterus.     Extraocular Movements: Extraocular movements intact.      Conjunctiva/sclera: Conjunctivae normal.      Pupils: Pupils are equal, round, and reactive to light.   Neck:      Musculoskeletal: Normal range of motion and neck supple.      Thyroid: No thyroid mass or thyromegaly.      Trachea: Trachea normal.   Cardiovascular:      Rate and Rhythm: Normal rate and regular rhythm.      Heart sounds: Normal heart sounds. No murmur.   Pulmonary:      Effort: Pulmonary effort is normal. No respiratory distress.      Breath sounds: Normal breath sounds. No wheezing or rales.   Abdominal:      General: Bowel sounds are normal.      Palpations: Abdomen is soft.      Tenderness: There is no abdominal tenderness.  "  Musculoskeletal: Normal range of motion.      Right lower leg: No edema.      Left lower leg: No edema.   Lymphadenopathy:      Cervical: No cervical adenopathy.   Skin:     General: Skin is warm and dry.      Coloration: Skin is not pale.      Findings: No rash.   Neurological:      Mental Status: He is alert and oriented to person, place, and time.   Psychiatric:         Mood and Affect: Mood normal.         Behavior: Behavior normal.         Thought Content: Thought content normal.         Judgment: Judgment normal.        Assessment:       1. Type 2 diabetes mellitus with hyperglycemia, without long-term current use of insulin    2. Essential hypertension    3. Mixed hyperlipidemia    4. Immunization due        Plan:       Type 2 diabetes mellitus with hyperglycemia, without long-term current use of insulin  -     insulin (LANTUS SOLOSTAR U-100 INSULIN) glargine 100 units/mL (3mL) SubQ pen; Inject 15 Units into the skin every evening.  Dispense: 2 Syringe; Refill: 2  -     pen needle, diabetic (PEN NEEDLE) 31 gauge x 5/16" Ndle; Use 1 pen needle daily to administer insulin  Dispense: 50 each; Refill: 5  -     Ambulatory referral/consult to Nutrition Services; Future; Expected date: 10/16/2020  -     blood-glucose meter kit; To check BG two times daily, to use with insurance preferred meter  Dispense: 1 each; Refill: 0  -     lancets Misc; To check BG two times daily, to use with insurance preferred meter  Dispense: 50 each; Refill: 5  -     blood sugar diagnostic Strp; To check BG two times daily, to use with insurance preferred meter  Dispense: 50 strip; Refill: 5    *continue metformin, add insulin nightly and new meter/supplies sent today; discussed he needs to monitor at least 2 times daily and record for follow up; recheck labs in 2 mo; continue diet, wt loss and exercise     Essential hypertension   *well-controlled on meds- continue as prescribed     Mixed hyperlipidemia  -     atorvastatin (LIPITOR) 20 " MG tablet; Take 1 tablet (20 mg total) by mouth every evening.  Dispense: 90 tablet; Refill: 1  *restart statin daily- ; DISCUSSED SE at length and pt voiced understanding; will recheck labs in 2-3 mo   *Limit red meat, butter, fried foods, cheese, and other foods that have a lot of saturated fat. Consume more: lean meats, fish, fruits, vegetables, whole grains, beans, lentils, and nuts.  Weight loss, and 30-45 min of cardiovascular exercise daily.     Immunization due  -     Influenza - Quadrivalent (PF)        Follow up in about 1 month (around 11/9/2020) for diabetes, HTN.      10/9/2020 PRIMO Zuniga, FNP

## 2020-11-13 ENCOUNTER — OFFICE VISIT (OUTPATIENT)
Dept: FAMILY MEDICINE | Facility: CLINIC | Age: 60
End: 2020-11-13
Payer: MEDICAID

## 2020-11-13 VITALS
HEART RATE: 85 BPM | WEIGHT: 219.81 LBS | DIASTOLIC BLOOD PRESSURE: 80 MMHG | OXYGEN SATURATION: 97 % | HEIGHT: 72 IN | SYSTOLIC BLOOD PRESSURE: 122 MMHG | BODY MASS INDEX: 29.77 KG/M2 | TEMPERATURE: 97 F

## 2020-11-13 DIAGNOSIS — E11.65 TYPE 2 DIABETES MELLITUS WITH HYPERGLYCEMIA, WITHOUT LONG-TERM CURRENT USE OF INSULIN: Primary | ICD-10-CM

## 2020-11-13 DIAGNOSIS — I10 ESSENTIAL HYPERTENSION: ICD-10-CM

## 2020-11-13 DIAGNOSIS — E78.2 MIXED HYPERLIPIDEMIA: ICD-10-CM

## 2020-11-13 DIAGNOSIS — Z23 IMMUNIZATION DUE: ICD-10-CM

## 2020-11-13 PROCEDURE — 90471 IMMUNIZATION ADMIN: CPT | Mod: PBBFAC | Performed by: NURSE PRACTITIONER

## 2020-11-13 PROCEDURE — 99213 PR OFFICE/OUTPT VISIT, EST, LEVL III, 20-29 MIN: ICD-10-PCS | Mod: S$PBB,,, | Performed by: NURSE PRACTITIONER

## 2020-11-13 PROCEDURE — 99213 OFFICE O/P EST LOW 20 MIN: CPT | Mod: S$PBB,,, | Performed by: NURSE PRACTITIONER

## 2020-11-13 PROCEDURE — 99215 OFFICE O/P EST HI 40 MIN: CPT | Performed by: NURSE PRACTITIONER

## 2020-11-13 RX ORDER — AMLODIPINE BESYLATE 5 MG/1
10 TABLET ORAL DAILY
COMMUNITY
Start: 2020-10-20 | End: 2020-11-13

## 2020-11-13 RX ORDER — GLIPIZIDE 5 MG/1
5 TABLET ORAL 2 TIMES DAILY
COMMUNITY
Start: 2020-10-20 | End: 2020-11-13

## 2020-11-13 RX ORDER — INSULIN GLARGINE 100 [IU]/ML
18 INJECTION, SOLUTION SUBCUTANEOUS NIGHTLY
Qty: 2 SYRINGE | Refills: 2 | Status: SHIPPED | OUTPATIENT
Start: 2020-11-13 | End: 2021-01-06 | Stop reason: SDUPTHER

## 2020-11-13 NOTE — PROGRESS NOTES
SUBJECTIVE:      Patient ID: Gagan Escobar is a 60 y.o. male.    Chief Complaint: Hypertension (follow up) and Diabetes    Established patient here for hypertension and diabetes follow-up.  He is doing very well on his insulin since his last visit and his blood sugars are starting to come down slowly.  Fasting blood sugars are ranging between 280 and 300.  His blood pressure is also very good today and he tells me it has been running below goal of 140/90 at home.  He also tells me his blood pressure has never been this low before any is very happy with the medication.  He denies any side effects with his medications currently.  He denies any new complaints or concerns today.      Family History   Problem Relation Age of Onset    Heart disease Mother     Mental illness Mother     Asthma Father       Social History     Socioeconomic History    Marital status:      Spouse name: Not on file    Number of children: Not on file    Years of education: Not on file    Highest education level: Not on file   Occupational History    Not on file   Social Needs    Financial resource strain: Not on file    Food insecurity     Worry: Not on file     Inability: Not on file    Transportation needs     Medical: Not on file     Non-medical: Not on file   Tobacco Use    Smoking status: Never Smoker    Smokeless tobacco: Never Used   Substance and Sexual Activity    Alcohol use: Yes     Frequency: Never    Drug use: No    Sexual activity: Not on file   Lifestyle    Physical activity     Days per week: Not on file     Minutes per session: Not on file    Stress: Not at all   Relationships    Social connections     Talks on phone: Not on file     Gets together: Not on file     Attends Jainism service: Not on file     Active member of club or organization: Not on file     Attends meetings of clubs or organizations: Not on file     Relationship status: Not on file   Other Topics Concern    Not on file  "  Social History Narrative    Not on file     Current Outpatient Medications   Medication Sig Dispense Refill    aspirin (ECOTRIN) 81 MG EC tablet Take 81 mg by mouth once daily.      atorvastatin (LIPITOR) 20 MG tablet Take 1 tablet (20 mg total) by mouth every evening. 90 tablet 1    blood sugar diagnostic Strp To check BG two times daily, to use with insurance preferred meter 50 strip 5    blood-glucose meter kit To check BG two times daily, to use with insurance preferred meter 1 each 0    cetirizine (ZYRTEC) 10 MG tablet Take 1 tablet (10 mg total) by mouth once daily. 30 tablet 0    insulin (LANTUS SOLOSTAR U-100 INSULIN) glargine 100 units/mL (3mL) SubQ pen Inject 18 Units into the skin every evening. 2 Syringe 2    lancets Misc To check BG two times daily, to use with insurance preferred meter 50 each 5    metFORMIN (FORTAMET) 500 mg 24hr tablet Take 1 tablet (500 mg total) by mouth daily with breakfast. 30 tablet 2    pen needle, diabetic (PEN NEEDLE) 31 gauge x 5/16" Ndle Use 1 pen needle daily to administer insulin 50 each 5    valsartan (DIOVAN) 160 MG tablet valsartan 160 mg tablet   Take 1 tablet every day by oral route for 30 days.      sildenafiL (VIAGRA) 100 MG tablet Viagra 100 mg tablet   Take 1 tablet every day by oral route as needed for 30 days.       No current facility-administered medications for this visit.      Review of patient's allergies indicates:   Allergen Reactions    Ace inhibitors Rash    Pcn [penicillins]       Past Medical History:   Diagnosis Date    Diabetes mellitus type 2 with complications     Hyperlipidemia     Hypertension      Past Surgical History:   Procedure Laterality Date    FOOT SURGERY      NECK SURGERY  02/2020       Review of Systems   Constitutional: Negative for activity change, appetite change, chills, diaphoresis, fatigue, fever and unexpected weight change.   HENT: Negative for congestion, ear pain, rhinorrhea and sore throat.    Eyes: " Negative for pain and visual disturbance.   Respiratory: Negative for cough, shortness of breath and wheezing.    Cardiovascular: Negative for chest pain, palpitations and leg swelling.   Gastrointestinal: Negative for abdominal pain, constipation, diarrhea, nausea and vomiting.   Endocrine: Positive for polyuria. Negative for cold intolerance, heat intolerance, polydipsia and polyphagia.   Genitourinary: Negative for difficulty urinating, dysuria, frequency, hematuria and urgency.   Musculoskeletal: Negative for myalgias.   Skin: Negative for pallor, rash and wound.   Neurological: Negative for dizziness, syncope, weakness, numbness and headaches.   Hematological: Negative for adenopathy. Does not bruise/bleed easily.   Psychiatric/Behavioral: Negative for dysphoric mood, sleep disturbance and suicidal ideas. The patient is not nervous/anxious.       OBJECTIVE:      Vitals:    11/13/20 1534   BP: 122/80   BP Location: Left arm   Patient Position: Sitting   BP Method: Large (Manual)   Pulse: 85   Temp: 97.4 °F (36.3 °C)   TempSrc: Temporal   SpO2: 97%   Weight: 99.7 kg (219 lb 12.8 oz)   Height: 6' (1.829 m)     Physical Exam  Vitals signs and nursing note reviewed.   Constitutional:       General: He is not in acute distress.     Appearance: He is well-developed. He is obese.   HENT:      Head: Normocephalic.      Mouth/Throat:      Mouth: Mucous membranes are moist.   Eyes:      General: No scleral icterus.     Conjunctiva/sclera: Conjunctivae normal.      Pupils: Pupils are equal, round, and reactive to light.   Neck:      Musculoskeletal: Normal range of motion and neck supple.      Thyroid: No thyroid mass or thyromegaly.      Trachea: Trachea normal.   Cardiovascular:      Rate and Rhythm: Normal rate and regular rhythm.      Heart sounds: Normal heart sounds. No murmur.   Pulmonary:      Effort: Pulmonary effort is normal. No respiratory distress.      Breath sounds: Normal breath sounds. No wheezing or  rales.   Abdominal:      General: Bowel sounds are normal.      Palpations: Abdomen is soft.      Tenderness: There is no abdominal tenderness.   Musculoskeletal: Normal range of motion.   Lymphadenopathy:      Cervical: No cervical adenopathy.   Skin:     General: Skin is warm and dry.      Coloration: Skin is not pale.   Neurological:      Mental Status: He is alert and oriented to person, place, and time.   Psychiatric:         Mood and Affect: Mood normal.         Behavior: Behavior normal.        Assessment:       1. Type 2 diabetes mellitus with hyperglycemia, without long-term current use of insulin    2. Essential hypertension    3. Mixed hyperlipidemia    4. Immunization due        Plan:       Type 2 diabetes mellitus with hyperglycemia, without long-term current use of insulin  -     insulin (LANTUS SOLOSTAR U-100 INSULIN) glargine 100 units/mL (3mL) SubQ pen; Inject 18 Units into the skin every evening.  Dispense: 2 Syringe; Refill: 2  -     Hemoglobin A1C; Future; Expected date: 01/01/2021  -     Comprehensive Metabolic Panel; Future; Expected date: 01/01/2021  -     Lipid Panel; Future; Expected date: 01/01/2021    * patient instructed to increase his Lantus by 2-3 units weekly if fasting blood sugar is above 200; patient and his wife both verbalized understanding of these instructions; he will contact me if he has any issues or hypoglycemia symptoms.  He will not go higher than Lantus 30 units at bedtime before until his next appointment; discussed will also likely increase his metformin if renal function is good on labs; Labs ordered for Jan 2021    Essential hypertension   *well-controlled on meds     Mixed hyperlipidemia   *tolerating statin  Without complaints     Immunization due  -     Pneumococcal Polysaccharide Vaccine (23 Valent) (SQ/IM)        Follow up in about 2 months (around 1/13/2021) for diabetes, HTN, HLD .      11/13/2020 Meri Galeano, PRIMO, FNP

## 2020-11-16 DIAGNOSIS — E11.65 TYPE 2 DIABETES MELLITUS WITH HYPERGLYCEMIA, WITHOUT LONG-TERM CURRENT USE OF INSULIN: ICD-10-CM

## 2020-11-16 NOTE — TELEPHONE ENCOUNTER
I think this version was substituted for something else- was a Glucophage instead?  I want to make sure have it right so they do not call back again- please let me know which metformin it is

## 2020-11-20 RX ORDER — METFORMIN HYDROCHLORIDE EXTENDED-RELEASE TABLETS 500 MG/1
500 TABLET, FILM COATED, EXTENDED RELEASE ORAL
Qty: 30 TABLET | Refills: 2 | OUTPATIENT
Start: 2020-11-20 | End: 2021-11-20

## 2020-11-30 ENCOUNTER — TELEPHONE (OUTPATIENT)
Dept: FAMILY MEDICINE | Facility: CLINIC | Age: 60
End: 2020-11-30

## 2020-12-15 DIAGNOSIS — E11.65 TYPE 2 DIABETES MELLITUS WITH HYPERGLYCEMIA, WITHOUT LONG-TERM CURRENT USE OF INSULIN: ICD-10-CM

## 2020-12-16 RX ORDER — METFORMIN HYDROCHLORIDE EXTENDED-RELEASE TABLETS 500 MG/1
500 TABLET, FILM COATED, EXTENDED RELEASE ORAL
Qty: 30 TABLET | Refills: 2 | Status: CANCELLED | OUTPATIENT
Start: 2020-12-16 | End: 2021-12-16

## 2020-12-16 NOTE — TELEPHONE ENCOUNTER
"Can you please find out which brand name he is on- is it glucophage? I remember Lucina having to give a verbal on this as the "fortamet" metformin was not covered - thanks!  "

## 2020-12-17 ENCOUNTER — TELEPHONE (OUTPATIENT)
Dept: FAMILY MEDICINE | Facility: CLINIC | Age: 60
End: 2020-12-17

## 2020-12-28 ENCOUNTER — TELEPHONE (OUTPATIENT)
Dept: FAMILY MEDICINE | Facility: CLINIC | Age: 60
End: 2020-12-28

## 2021-01-06 ENCOUNTER — LAB VISIT (OUTPATIENT)
Dept: LAB | Facility: HOSPITAL | Age: 61
End: 2021-01-06
Attending: NURSE PRACTITIONER
Payer: MEDICAID

## 2021-01-06 ENCOUNTER — TELEPHONE (OUTPATIENT)
Dept: FAMILY MEDICINE | Facility: CLINIC | Age: 61
End: 2021-01-06

## 2021-01-06 DIAGNOSIS — E11.65 TYPE 2 DIABETES MELLITUS WITH HYPERGLYCEMIA, WITHOUT LONG-TERM CURRENT USE OF INSULIN: Primary | ICD-10-CM

## 2021-01-06 DIAGNOSIS — E11.65 TYPE 2 DIABETES MELLITUS WITH HYPERGLYCEMIA, WITHOUT LONG-TERM CURRENT USE OF INSULIN: ICD-10-CM

## 2021-01-06 LAB
ALBUMIN SERPL BCP-MCNC: 4.3 G/DL (ref 3.5–5.2)
ALP SERPL-CCNC: 68 U/L (ref 55–135)
ALT SERPL W/O P-5'-P-CCNC: 23 U/L (ref 10–44)
ANION GAP SERPL CALC-SCNC: 13 MMOL/L (ref 8–16)
AST SERPL-CCNC: 17 U/L (ref 10–40)
BILIRUB SERPL-MCNC: 1.3 MG/DL (ref 0.1–1)
BUN SERPL-MCNC: 13 MG/DL (ref 6–20)
CALCIUM SERPL-MCNC: 9.6 MG/DL (ref 8.7–10.5)
CHLORIDE SERPL-SCNC: 99 MMOL/L (ref 95–110)
CHOLEST SERPL-MCNC: 168 MG/DL (ref 120–199)
CHOLEST/HDLC SERPL: 3.7 {RATIO} (ref 2–5)
CO2 SERPL-SCNC: 23 MMOL/L (ref 23–29)
CREAT SERPL-MCNC: 1 MG/DL (ref 0.5–1.4)
EST. GFR  (AFRICAN AMERICAN): >60 ML/MIN/1.73 M^2
EST. GFR  (NON AFRICAN AMERICAN): >60 ML/MIN/1.73 M^2
ESTIMATED AVG GLUCOSE: 295 MG/DL (ref 68–131)
GLUCOSE SERPL-MCNC: 487 MG/DL (ref 70–110)
HBA1C MFR BLD HPLC: 11.9 % (ref 4.5–6.2)
HDLC SERPL-MCNC: 46 MG/DL (ref 40–75)
HDLC SERPL: 27.4 % (ref 20–50)
LDLC SERPL CALC-MCNC: 80.4 MG/DL (ref 63–159)
NONHDLC SERPL-MCNC: 122 MG/DL
POTASSIUM SERPL-SCNC: 4.7 MMOL/L (ref 3.5–5.1)
PROT SERPL-MCNC: 7.6 G/DL (ref 6–8.4)
SODIUM SERPL-SCNC: 135 MMOL/L (ref 136–145)
TRIGL SERPL-MCNC: 208 MG/DL (ref 30–150)

## 2021-01-06 PROCEDURE — 83036 HEMOGLOBIN GLYCOSYLATED A1C: CPT

## 2021-01-06 PROCEDURE — 36415 COLL VENOUS BLD VENIPUNCTURE: CPT

## 2021-01-06 PROCEDURE — 80061 LIPID PANEL: CPT

## 2021-01-06 PROCEDURE — 80053 COMPREHEN METABOLIC PANEL: CPT

## 2021-01-06 RX ORDER — METFORMIN HYDROCHLORIDE 500 MG/1
1000 TABLET, EXTENDED RELEASE ORAL
Qty: 180 TABLET | Refills: 1 | Status: SHIPPED | OUTPATIENT
Start: 2021-01-06 | End: 2021-05-19 | Stop reason: SDUPTHER

## 2021-01-06 RX ORDER — INSULIN GLARGINE 100 [IU]/ML
23 INJECTION, SOLUTION SUBCUTANEOUS NIGHTLY
Qty: 3 SYRINGE | Refills: 5 | Status: SHIPPED | OUTPATIENT
Start: 2021-01-06 | End: 2021-02-18 | Stop reason: SDUPTHER

## 2021-01-13 ENCOUNTER — OFFICE VISIT (OUTPATIENT)
Dept: FAMILY MEDICINE | Facility: CLINIC | Age: 61
End: 2021-01-13
Payer: MEDICAID

## 2021-01-13 VITALS
BODY MASS INDEX: 30.66 KG/M2 | HEIGHT: 72 IN | OXYGEN SATURATION: 97 % | HEART RATE: 110 BPM | WEIGHT: 226.38 LBS | TEMPERATURE: 98 F

## 2021-01-13 DIAGNOSIS — B37.89 CANDIDA RASH OF GROIN: ICD-10-CM

## 2021-01-13 DIAGNOSIS — E11.65 TYPE 2 DIABETES MELLITUS WITH HYPERGLYCEMIA, WITHOUT LONG-TERM CURRENT USE OF INSULIN: Primary | ICD-10-CM

## 2021-01-13 PROCEDURE — 99213 PR OFFICE/OUTPT VISIT, EST, LEVL III, 20-29 MIN: ICD-10-PCS | Mod: S$PBB,,, | Performed by: NURSE PRACTITIONER

## 2021-01-13 PROCEDURE — 99213 OFFICE O/P EST LOW 20 MIN: CPT | Mod: S$PBB,,, | Performed by: NURSE PRACTITIONER

## 2021-01-13 PROCEDURE — 99215 OFFICE O/P EST HI 40 MIN: CPT | Performed by: NURSE PRACTITIONER

## 2021-01-13 RX ORDER — FLUCONAZOLE 150 MG/1
150 TABLET ORAL DAILY
Qty: 1 TABLET | Refills: 0 | Status: SHIPPED | OUTPATIENT
Start: 2021-01-13 | End: 2021-01-14

## 2021-01-13 RX ORDER — ASPIRIN 81 MG/1
81 TABLET ORAL DAILY
Qty: 90 TABLET | Refills: 1 | Status: SHIPPED | OUTPATIENT
Start: 2021-01-13

## 2021-02-04 ENCOUNTER — TELEPHONE (OUTPATIENT)
Dept: FAMILY MEDICINE | Facility: CLINIC | Age: 61
End: 2021-02-04

## 2021-02-04 DIAGNOSIS — E11.65 TYPE 2 DIABETES MELLITUS WITH HYPERGLYCEMIA, WITHOUT LONG-TERM CURRENT USE OF INSULIN: ICD-10-CM

## 2021-02-04 RX ORDER — LANCETS
EACH MISCELLANEOUS
Qty: 90 EACH | Refills: 1 | Status: SHIPPED | OUTPATIENT
Start: 2021-02-04 | End: 2021-05-19 | Stop reason: SDUPTHER

## 2021-02-09 ENCOUNTER — TELEPHONE (OUTPATIENT)
Dept: FAMILY MEDICINE | Facility: CLINIC | Age: 61
End: 2021-02-09

## 2021-02-18 ENCOUNTER — OFFICE VISIT (OUTPATIENT)
Dept: FAMILY MEDICINE | Facility: CLINIC | Age: 61
End: 2021-02-18
Payer: MEDICAID

## 2021-02-18 VITALS
BODY MASS INDEX: 30.88 KG/M2 | HEIGHT: 72 IN | WEIGHT: 228 LBS | DIASTOLIC BLOOD PRESSURE: 88 MMHG | HEART RATE: 101 BPM | TEMPERATURE: 98 F | SYSTOLIC BLOOD PRESSURE: 122 MMHG | OXYGEN SATURATION: 97 %

## 2021-02-18 DIAGNOSIS — I10 ESSENTIAL HYPERTENSION: ICD-10-CM

## 2021-02-18 DIAGNOSIS — E11.65 TYPE 2 DIABETES MELLITUS WITH HYPERGLYCEMIA, WITHOUT LONG-TERM CURRENT USE OF INSULIN: Primary | ICD-10-CM

## 2021-02-18 DIAGNOSIS — N52.9 ERECTILE DYSFUNCTION, UNSPECIFIED ERECTILE DYSFUNCTION TYPE: ICD-10-CM

## 2021-02-18 DIAGNOSIS — Z12.11 COLON CANCER SCREENING: ICD-10-CM

## 2021-02-18 PROCEDURE — 99215 OFFICE O/P EST HI 40 MIN: CPT | Performed by: NURSE PRACTITIONER

## 2021-02-18 PROCEDURE — 99213 OFFICE O/P EST LOW 20 MIN: CPT | Mod: S$PBB,,, | Performed by: NURSE PRACTITIONER

## 2021-02-18 PROCEDURE — 99213 PR OFFICE/OUTPT VISIT, EST, LEVL III, 20-29 MIN: ICD-10-PCS | Mod: S$PBB,,, | Performed by: NURSE PRACTITIONER

## 2021-02-18 RX ORDER — INSULIN GLARGINE 100 [IU]/ML
30 INJECTION, SOLUTION SUBCUTANEOUS NIGHTLY
Qty: 3 SYRINGE | Refills: 5 | Status: SHIPPED | OUTPATIENT
Start: 2021-02-18 | End: 2021-05-19 | Stop reason: SDUPTHER

## 2021-02-18 RX ORDER — SILDENAFIL 100 MG/1
100 TABLET, FILM COATED ORAL
Qty: 9 TABLET | Refills: 5 | Status: SHIPPED | OUTPATIENT
Start: 2021-02-18 | End: 2021-05-19

## 2021-02-19 DIAGNOSIS — E11.65 TYPE 2 DIABETES MELLITUS WITH HYPERGLYCEMIA, WITHOUT LONG-TERM CURRENT USE OF INSULIN: Primary | ICD-10-CM

## 2021-02-19 RX ORDER — LANCETS
EACH MISCELLANEOUS
Qty: 200 EACH | Refills: 2 | Status: SHIPPED | OUTPATIENT
Start: 2021-02-19 | End: 2021-05-19 | Stop reason: SDUPTHER

## 2021-02-19 RX ORDER — INSULIN PUMP SYRINGE, 3 ML
EACH MISCELLANEOUS
Qty: 1 EACH | Refills: 0 | Status: SHIPPED | OUTPATIENT
Start: 2021-02-19 | End: 2021-05-19 | Stop reason: SDUPTHER

## 2021-02-25 ENCOUNTER — TELEPHONE (OUTPATIENT)
Dept: NUTRITION | Facility: HOSPITAL | Age: 61
End: 2021-02-25

## 2021-04-06 ENCOUNTER — TELEPHONE (OUTPATIENT)
Dept: FAMILY MEDICINE | Facility: CLINIC | Age: 61
End: 2021-04-06

## 2021-04-06 DIAGNOSIS — E78.2 MIXED HYPERLIPIDEMIA: ICD-10-CM

## 2021-04-06 RX ORDER — ATORVASTATIN CALCIUM 20 MG/1
20 TABLET, FILM COATED ORAL NIGHTLY
Qty: 90 TABLET | Refills: 1 | Status: SHIPPED | OUTPATIENT
Start: 2021-04-06 | End: 2021-09-29 | Stop reason: SDUPTHER

## 2021-04-06 RX ORDER — VALSARTAN 160 MG/1
TABLET ORAL
Qty: 90 TABLET | Refills: 1 | Status: SHIPPED | OUTPATIENT
Start: 2021-04-06 | End: 2021-11-09

## 2021-05-06 ENCOUNTER — LAB VISIT (OUTPATIENT)
Dept: LAB | Facility: HOSPITAL | Age: 61
End: 2021-05-06
Attending: NURSE PRACTITIONER
Payer: MEDICAID

## 2021-05-06 ENCOUNTER — TELEPHONE (OUTPATIENT)
Dept: FAMILY MEDICINE | Facility: CLINIC | Age: 61
End: 2021-05-06

## 2021-05-06 DIAGNOSIS — E11.65 TYPE 2 DIABETES MELLITUS WITH HYPERGLYCEMIA, WITHOUT LONG-TERM CURRENT USE OF INSULIN: ICD-10-CM

## 2021-05-06 LAB
ALBUMIN SERPL BCP-MCNC: 4.3 G/DL (ref 3.5–5.2)
ALP SERPL-CCNC: 59 U/L (ref 55–135)
ALT SERPL W/O P-5'-P-CCNC: 31 U/L (ref 10–44)
ANION GAP SERPL CALC-SCNC: 9 MMOL/L (ref 8–16)
AST SERPL-CCNC: 20 U/L (ref 10–40)
BILIRUB SERPL-MCNC: 1.5 MG/DL (ref 0.1–1)
BUN SERPL-MCNC: 10 MG/DL (ref 6–20)
CALCIUM SERPL-MCNC: 9.3 MG/DL (ref 8.7–10.5)
CHLORIDE SERPL-SCNC: 98 MMOL/L (ref 95–110)
CO2 SERPL-SCNC: 26 MMOL/L (ref 23–29)
CREAT SERPL-MCNC: 0.7 MG/DL (ref 0.5–1.4)
EST. GFR  (AFRICAN AMERICAN): >60 ML/MIN/1.73 M^2
EST. GFR  (NON AFRICAN AMERICAN): >60 ML/MIN/1.73 M^2
ESTIMATED AVG GLUCOSE: 255 MG/DL (ref 68–131)
GLUCOSE SERPL-MCNC: 309 MG/DL (ref 70–110)
HBA1C MFR BLD: 10.5 % (ref 4.5–6.2)
POTASSIUM SERPL-SCNC: 4.3 MMOL/L (ref 3.5–5.1)
PROT SERPL-MCNC: 7.5 G/DL (ref 6–8.4)
SODIUM SERPL-SCNC: 133 MMOL/L (ref 136–145)

## 2021-05-06 PROCEDURE — 36415 COLL VENOUS BLD VENIPUNCTURE: CPT | Performed by: NURSE PRACTITIONER

## 2021-05-06 PROCEDURE — 83036 HEMOGLOBIN GLYCOSYLATED A1C: CPT | Performed by: NURSE PRACTITIONER

## 2021-05-06 PROCEDURE — 80053 COMPREHEN METABOLIC PANEL: CPT | Performed by: NURSE PRACTITIONER

## 2021-05-19 ENCOUNTER — OFFICE VISIT (OUTPATIENT)
Dept: FAMILY MEDICINE | Facility: CLINIC | Age: 61
End: 2021-05-19
Payer: MEDICAID

## 2021-05-19 VITALS
OXYGEN SATURATION: 98 % | BODY MASS INDEX: 31.29 KG/M2 | HEIGHT: 72 IN | HEART RATE: 88 BPM | WEIGHT: 231 LBS | DIASTOLIC BLOOD PRESSURE: 72 MMHG | TEMPERATURE: 99 F | SYSTOLIC BLOOD PRESSURE: 120 MMHG

## 2021-05-19 DIAGNOSIS — Z12.11 COLON CANCER SCREENING: ICD-10-CM

## 2021-05-19 DIAGNOSIS — I10 ESSENTIAL HYPERTENSION: ICD-10-CM

## 2021-05-19 DIAGNOSIS — E11.65 TYPE 2 DIABETES MELLITUS WITH HYPERGLYCEMIA, WITHOUT LONG-TERM CURRENT USE OF INSULIN: Primary | ICD-10-CM

## 2021-05-19 PROCEDURE — 99213 OFFICE O/P EST LOW 20 MIN: CPT | Mod: S$PBB,,, | Performed by: NURSE PRACTITIONER

## 2021-05-19 PROCEDURE — 99213 PR OFFICE/OUTPT VISIT, EST, LEVL III, 20-29 MIN: ICD-10-PCS | Mod: S$PBB,,, | Performed by: NURSE PRACTITIONER

## 2021-05-19 PROCEDURE — 99214 OFFICE O/P EST MOD 30 MIN: CPT | Performed by: NURSE PRACTITIONER

## 2021-05-19 RX ORDER — LANCETS
EACH MISCELLANEOUS
Qty: 100 EACH | Refills: 5 | Status: SHIPPED | OUTPATIENT
Start: 2021-05-19

## 2021-05-19 RX ORDER — METFORMIN HYDROCHLORIDE 500 MG/1
1000 TABLET, EXTENDED RELEASE ORAL
Qty: 180 TABLET | Refills: 1 | Status: SHIPPED | OUTPATIENT
Start: 2021-05-19 | End: 2021-12-29 | Stop reason: SDUPTHER

## 2021-05-19 RX ORDER — INSULIN GLARGINE 100 [IU]/ML
30 INJECTION, SOLUTION SUBCUTANEOUS NIGHTLY
Qty: 6 SYRINGE | Refills: 1 | Status: SHIPPED | OUTPATIENT
Start: 2021-05-19 | End: 2021-11-09

## 2021-05-19 RX ORDER — PEN NEEDLE, DIABETIC 30 GX3/16"
NEEDLE, DISPOSABLE MISCELLANEOUS
Qty: 50 EACH | Refills: 5 | Status: SHIPPED | OUTPATIENT
Start: 2021-05-19 | End: 2022-08-01

## 2021-05-26 ENCOUNTER — TELEPHONE (OUTPATIENT)
Dept: FAMILY MEDICINE | Facility: CLINIC | Age: 61
End: 2021-05-26

## 2021-05-26 DIAGNOSIS — E11.65 TYPE 2 DIABETES MELLITUS WITH HYPERGLYCEMIA, WITHOUT LONG-TERM CURRENT USE OF INSULIN: Primary | ICD-10-CM

## 2021-05-26 RX ORDER — EXENATIDE 2 MG/.85ML
2 INJECTION, SUSPENSION, EXTENDED RELEASE SUBCUTANEOUS
Qty: 4 PEN | Refills: 5 | Status: SHIPPED | OUTPATIENT
Start: 2021-05-26 | End: 2021-06-09 | Stop reason: ALTCHOICE

## 2021-05-27 ENCOUNTER — TELEPHONE (OUTPATIENT)
Dept: FAMILY MEDICINE | Facility: CLINIC | Age: 61
End: 2021-05-27

## 2021-05-27 DIAGNOSIS — Z12.11 COLON CANCER SCREENING: Primary | ICD-10-CM

## 2021-06-04 ENCOUNTER — TELEPHONE (OUTPATIENT)
Dept: FAMILY MEDICINE | Facility: CLINIC | Age: 61
End: 2021-06-04

## 2021-06-08 ENCOUNTER — TELEPHONE (OUTPATIENT)
Dept: FAMILY MEDICINE | Facility: CLINIC | Age: 61
End: 2021-06-08

## 2021-06-08 DIAGNOSIS — E11.65 TYPE 2 DIABETES MELLITUS WITH HYPERGLYCEMIA, WITHOUT LONG-TERM CURRENT USE OF INSULIN: Primary | ICD-10-CM

## 2021-06-09 RX ORDER — DULAGLUTIDE 0.75 MG/.5ML
0.75 INJECTION, SOLUTION SUBCUTANEOUS
Qty: 4 PEN | Refills: 3 | Status: SHIPPED | OUTPATIENT
Start: 2021-06-09 | End: 2021-07-07

## 2021-08-17 ENCOUNTER — OFFICE VISIT (OUTPATIENT)
Dept: URGENT CARE | Facility: CLINIC | Age: 61
End: 2021-08-17
Payer: MEDICAID

## 2021-08-17 VITALS
SYSTOLIC BLOOD PRESSURE: 167 MMHG | WEIGHT: 233.19 LBS | BODY MASS INDEX: 31.58 KG/M2 | HEART RATE: 83 BPM | RESPIRATION RATE: 16 BRPM | TEMPERATURE: 98 F | OXYGEN SATURATION: 98 % | DIASTOLIC BLOOD PRESSURE: 102 MMHG | HEIGHT: 72 IN

## 2021-08-17 DIAGNOSIS — J02.9 PHARYNGITIS, UNSPECIFIED ETIOLOGY: ICD-10-CM

## 2021-08-17 DIAGNOSIS — J32.9 SINUSITIS, UNSPECIFIED CHRONICITY, UNSPECIFIED LOCATION: ICD-10-CM

## 2021-08-17 DIAGNOSIS — Z20.822 COVID-19 VIRUS NOT DETECTED: Primary | ICD-10-CM

## 2021-08-17 DIAGNOSIS — R05.9 COUGH: ICD-10-CM

## 2021-08-17 LAB
CTP QC/QA: YES
SARS-COV-2 RDRP RESP QL NAA+PROBE: NEGATIVE

## 2021-08-17 PROCEDURE — 99213 OFFICE O/P EST LOW 20 MIN: CPT | Mod: S$GLB,,, | Performed by: NURSE PRACTITIONER

## 2021-08-17 PROCEDURE — 87635 SARS-COV-2 COVID-19 AMP PRB: CPT | Mod: QW,S$GLB,, | Performed by: NURSE PRACTITIONER

## 2021-08-17 PROCEDURE — 87635 PR SARS-COV-2 COVID-19 AMPLIFIED PROBE: ICD-10-PCS | Mod: QW,S$GLB,, | Performed by: NURSE PRACTITIONER

## 2021-08-17 PROCEDURE — 99213 PR OFFICE/OUTPT VISIT, EST, LEVL III, 20-29 MIN: ICD-10-PCS | Mod: S$GLB,,, | Performed by: NURSE PRACTITIONER

## 2021-08-17 RX ORDER — FLUTICASONE PROPIONATE 50 MCG
1 SPRAY, SUSPENSION (ML) NASAL DAILY
Qty: 11.1 ML | Refills: 0 | Status: SHIPPED | OUTPATIENT
Start: 2021-08-17 | End: 2022-07-27

## 2021-08-17 RX ORDER — DULAGLUTIDE 0.75 MG/.5ML
0.75 INJECTION, SOLUTION SUBCUTANEOUS
COMMUNITY
Start: 2021-08-04 | End: 2021-09-29 | Stop reason: SDUPTHER

## 2021-08-17 RX ORDER — CETIRIZINE HYDROCHLORIDE 10 MG/1
10 TABLET ORAL DAILY
Qty: 30 TABLET | Refills: 0 | Status: SHIPPED | OUTPATIENT
Start: 2021-08-17 | End: 2021-09-29 | Stop reason: SDUPTHER

## 2021-09-16 ENCOUNTER — LAB VISIT (OUTPATIENT)
Dept: LAB | Facility: HOSPITAL | Age: 61
End: 2021-09-16
Attending: NURSE PRACTITIONER
Payer: MEDICAID

## 2021-09-16 DIAGNOSIS — E11.65 TYPE 2 DIABETES MELLITUS WITH HYPERGLYCEMIA, WITHOUT LONG-TERM CURRENT USE OF INSULIN: ICD-10-CM

## 2021-09-16 PROCEDURE — 36415 COLL VENOUS BLD VENIPUNCTURE: CPT | Performed by: NURSE PRACTITIONER

## 2021-09-16 PROCEDURE — 83036 HEMOGLOBIN GLYCOSYLATED A1C: CPT | Performed by: NURSE PRACTITIONER

## 2021-09-17 ENCOUNTER — TELEPHONE (OUTPATIENT)
Dept: FAMILY MEDICINE | Facility: CLINIC | Age: 61
End: 2021-09-17

## 2021-09-17 LAB
ESTIMATED AVG GLUCOSE: 183 MG/DL (ref 68–131)
HBA1C MFR BLD: 8 % (ref 4.5–6.2)

## 2021-09-29 ENCOUNTER — OFFICE VISIT (OUTPATIENT)
Dept: FAMILY MEDICINE | Facility: CLINIC | Age: 61
End: 2021-09-29
Payer: MEDICAID

## 2021-09-29 VITALS
TEMPERATURE: 99 F | HEART RATE: 106 BPM | SYSTOLIC BLOOD PRESSURE: 128 MMHG | WEIGHT: 225.31 LBS | OXYGEN SATURATION: 96 % | BODY MASS INDEX: 30.52 KG/M2 | HEIGHT: 72 IN | DIASTOLIC BLOOD PRESSURE: 80 MMHG

## 2021-09-29 DIAGNOSIS — Z91.09 ENVIRONMENTAL ALLERGIES: ICD-10-CM

## 2021-09-29 DIAGNOSIS — Z12.11 SCREENING FOR COLON CANCER: ICD-10-CM

## 2021-09-29 DIAGNOSIS — Z12.5 SCREENING PSA (PROSTATE SPECIFIC ANTIGEN): ICD-10-CM

## 2021-09-29 DIAGNOSIS — E78.2 MIXED HYPERLIPIDEMIA: ICD-10-CM

## 2021-09-29 DIAGNOSIS — E11.65 TYPE 2 DIABETES MELLITUS WITH HYPERGLYCEMIA, WITHOUT LONG-TERM CURRENT USE OF INSULIN: Primary | ICD-10-CM

## 2021-09-29 DIAGNOSIS — I10 ESSENTIAL HYPERTENSION: ICD-10-CM

## 2021-09-29 PROCEDURE — 99215 OFFICE O/P EST HI 40 MIN: CPT | Performed by: NURSE PRACTITIONER

## 2021-09-29 PROCEDURE — 99214 PR OFFICE/OUTPT VISIT, EST, LEVL IV, 30-39 MIN: ICD-10-PCS | Mod: S$PBB,,, | Performed by: NURSE PRACTITIONER

## 2021-09-29 PROCEDURE — 99214 OFFICE O/P EST MOD 30 MIN: CPT | Mod: S$PBB,,, | Performed by: NURSE PRACTITIONER

## 2021-09-29 RX ORDER — ATORVASTATIN CALCIUM 20 MG/1
20 TABLET, FILM COATED ORAL NIGHTLY
Qty: 90 TABLET | Refills: 1 | Status: SHIPPED | OUTPATIENT
Start: 2021-09-29 | End: 2022-01-26 | Stop reason: SDUPTHER

## 2021-09-29 RX ORDER — CETIRIZINE HYDROCHLORIDE 10 MG/1
10 TABLET ORAL DAILY
Qty: 90 TABLET | Refills: 1 | Status: SHIPPED | OUTPATIENT
Start: 2021-09-29 | End: 2022-07-27

## 2021-09-29 RX ORDER — DULAGLUTIDE 0.75 MG/.5ML
0.75 INJECTION, SOLUTION SUBCUTANEOUS
Qty: 4 PEN | Refills: 5 | Status: SHIPPED | OUTPATIENT
Start: 2021-09-29 | End: 2022-07-27 | Stop reason: DRUGHIGH

## 2021-11-04 ENCOUNTER — TELEPHONE (OUTPATIENT)
Dept: FAMILY MEDICINE | Facility: CLINIC | Age: 61
End: 2021-11-04
Payer: MEDICAID

## 2021-11-04 DIAGNOSIS — R19.5 POSITIVE COLORECTAL CANCER SCREENING USING COLOGUARD TEST: Primary | ICD-10-CM

## 2021-11-04 LAB — NONINV COLON CA DNA+OCC BLD SCRN STL QL: POSITIVE

## 2021-12-29 DIAGNOSIS — E11.65 TYPE 2 DIABETES MELLITUS WITH HYPERGLYCEMIA, WITHOUT LONG-TERM CURRENT USE OF INSULIN: ICD-10-CM

## 2021-12-29 RX ORDER — METFORMIN HYDROCHLORIDE 500 MG/1
1000 TABLET, EXTENDED RELEASE ORAL
Qty: 180 TABLET | Refills: 0 | Status: SHIPPED | OUTPATIENT
Start: 2021-12-29 | End: 2022-03-30

## 2021-12-29 NOTE — TELEPHONE ENCOUNTER
Patient's wife tested positive for COVID and he is having symptoms as well so he cancelled his appt for today. He only have a week of Metformin left. He will call back to reschedule his appt when he is asymptomatic

## 2022-01-03 ENCOUNTER — LAB VISIT (OUTPATIENT)
Dept: LAB | Facility: HOSPITAL | Age: 62
End: 2022-01-03
Attending: NURSE PRACTITIONER
Payer: MEDICAID

## 2022-01-03 DIAGNOSIS — E11.65 TYPE 2 DIABETES MELLITUS WITH HYPERGLYCEMIA, WITHOUT LONG-TERM CURRENT USE OF INSULIN: ICD-10-CM

## 2022-01-03 DIAGNOSIS — Z12.5 SCREENING PSA (PROSTATE SPECIFIC ANTIGEN): ICD-10-CM

## 2022-01-03 DIAGNOSIS — E78.2 MIXED HYPERLIPIDEMIA: ICD-10-CM

## 2022-01-03 DIAGNOSIS — I10 ESSENTIAL HYPERTENSION: ICD-10-CM

## 2022-01-03 LAB
ALBUMIN SERPL BCP-MCNC: 4.1 G/DL (ref 3.5–5.2)
ALBUMIN/CREAT UR: 4 UG/MG (ref 0–30)
ALP SERPL-CCNC: 46 U/L (ref 55–135)
ALT SERPL W/O P-5'-P-CCNC: 28 U/L (ref 10–44)
ANION GAP SERPL CALC-SCNC: 11 MMOL/L (ref 8–16)
AST SERPL-CCNC: 21 U/L (ref 10–40)
BASOPHILS # BLD AUTO: 0.03 K/UL (ref 0–0.2)
BASOPHILS NFR BLD: 0.5 % (ref 0–1.9)
BILIRUB SERPL-MCNC: 1.1 MG/DL (ref 0.1–1)
BILIRUB UR QL STRIP: NEGATIVE
BUN SERPL-MCNC: 12 MG/DL (ref 6–20)
CALCIUM SERPL-MCNC: 9.3 MG/DL (ref 8.7–10.5)
CHLORIDE SERPL-SCNC: 100 MMOL/L (ref 95–110)
CHOLEST SERPL-MCNC: 185 MG/DL (ref 120–199)
CHOLEST/HDLC SERPL: 4.9 {RATIO} (ref 2–5)
CLARITY UR: CLEAR
CO2 SERPL-SCNC: 25 MMOL/L (ref 23–29)
COLOR UR: YELLOW
COMPLEXED PSA SERPL-MCNC: 2.6 NG/ML (ref 0–4)
CREAT SERPL-MCNC: 0.9 MG/DL (ref 0.5–1.4)
CREAT UR-MCNC: 217 MG/DL (ref 23–375)
DIFFERENTIAL METHOD: NORMAL
EOSINOPHIL # BLD AUTO: 0.2 K/UL (ref 0–0.5)
EOSINOPHIL NFR BLD: 2.6 % (ref 0–8)
ERYTHROCYTE [DISTWIDTH] IN BLOOD BY AUTOMATED COUNT: 12.8 % (ref 11.5–14.5)
EST. GFR  (AFRICAN AMERICAN): >60 ML/MIN/1.73 M^2
EST. GFR  (NON AFRICAN AMERICAN): >60 ML/MIN/1.73 M^2
ESTIMATED AVG GLUCOSE: 154 MG/DL (ref 68–131)
GLUCOSE SERPL-MCNC: 184 MG/DL (ref 70–110)
GLUCOSE UR QL STRIP: NEGATIVE
HBA1C MFR BLD: 7 % (ref 4.5–6.2)
HCT VFR BLD AUTO: 48.2 % (ref 40–54)
HDLC SERPL-MCNC: 38 MG/DL (ref 40–75)
HDLC SERPL: 20.5 % (ref 20–50)
HGB BLD-MCNC: 16.6 G/DL (ref 14–18)
HGB UR QL STRIP: NEGATIVE
IMM GRANULOCYTES # BLD AUTO: 0.01 K/UL (ref 0–0.04)
IMM GRANULOCYTES NFR BLD AUTO: 0.2 % (ref 0–0.5)
KETONES UR QL STRIP: NEGATIVE
LDLC SERPL CALC-MCNC: 117.6 MG/DL (ref 63–159)
LEUKOCYTE ESTERASE UR QL STRIP: NEGATIVE
LYMPHOCYTES # BLD AUTO: 2.4 K/UL (ref 1–4.8)
LYMPHOCYTES NFR BLD: 39.1 % (ref 18–48)
MCH RBC QN AUTO: 30.4 PG (ref 27–31)
MCHC RBC AUTO-ENTMCNC: 34.4 G/DL (ref 32–36)
MCV RBC AUTO: 88 FL (ref 82–98)
MICROALBUMIN UR DL<=1MG/L-MCNC: 8.7 UG/ML
MONOCYTES # BLD AUTO: 0.7 K/UL (ref 0.3–1)
MONOCYTES NFR BLD: 11.4 % (ref 4–15)
NEUTROPHILS # BLD AUTO: 2.9 K/UL (ref 1.8–7.7)
NEUTROPHILS NFR BLD: 46.2 % (ref 38–73)
NITRITE UR QL STRIP: NEGATIVE
NONHDLC SERPL-MCNC: 147 MG/DL
NRBC BLD-RTO: 0 /100 WBC
PH UR STRIP: 5 [PH] (ref 5–8)
PLATELET # BLD AUTO: 275 K/UL (ref 150–450)
PMV BLD AUTO: 9.5 FL (ref 9.2–12.9)
POTASSIUM SERPL-SCNC: 4.5 MMOL/L (ref 3.5–5.1)
PROT SERPL-MCNC: 7.3 G/DL (ref 6–8.4)
PROT UR QL STRIP: ABNORMAL
RBC # BLD AUTO: 5.46 M/UL (ref 4.6–6.2)
SODIUM SERPL-SCNC: 136 MMOL/L (ref 136–145)
SP GR UR STRIP: 1.02 (ref 1–1.03)
TRIGL SERPL-MCNC: 147 MG/DL (ref 30–150)
TSH SERPL DL<=0.005 MIU/L-ACNC: 5.25 UIU/ML (ref 0.34–5.6)
URN SPEC COLLECT METH UR: ABNORMAL
UROBILINOGEN UR STRIP-ACNC: NEGATIVE EU/DL
WBC # BLD AUTO: 6.22 K/UL (ref 3.9–12.7)

## 2022-01-03 PROCEDURE — 36415 COLL VENOUS BLD VENIPUNCTURE: CPT | Performed by: NURSE PRACTITIONER

## 2022-01-03 PROCEDURE — 84153 ASSAY OF PSA TOTAL: CPT | Performed by: NURSE PRACTITIONER

## 2022-01-03 PROCEDURE — 82043 UR ALBUMIN QUANTITATIVE: CPT | Performed by: NURSE PRACTITIONER

## 2022-01-03 PROCEDURE — 83036 HEMOGLOBIN GLYCOSYLATED A1C: CPT | Performed by: NURSE PRACTITIONER

## 2022-01-03 PROCEDURE — 80053 COMPREHEN METABOLIC PANEL: CPT | Performed by: NURSE PRACTITIONER

## 2022-01-03 PROCEDURE — 80061 LIPID PANEL: CPT | Performed by: NURSE PRACTITIONER

## 2022-01-03 PROCEDURE — 85025 COMPLETE CBC W/AUTO DIFF WBC: CPT | Performed by: NURSE PRACTITIONER

## 2022-01-03 PROCEDURE — 84443 ASSAY THYROID STIM HORMONE: CPT | Performed by: NURSE PRACTITIONER

## 2022-01-03 PROCEDURE — 81003 URINALYSIS AUTO W/O SCOPE: CPT | Performed by: NURSE PRACTITIONER

## 2022-01-03 NOTE — PROGRESS NOTES
Diabetes looks significantly improved! A1C down to 7.0; cholesterol improving; labs stable; follow up after his 10 day quarantine from COVID

## 2022-01-04 ENCOUNTER — TELEPHONE (OUTPATIENT)
Dept: FAMILY MEDICINE | Facility: CLINIC | Age: 62
End: 2022-01-04
Payer: MEDICAID

## 2022-01-04 NOTE — TELEPHONE ENCOUNTER
----- Message from MARCOS Duncan sent at 1/3/2022  3:21 PM CST -----  Diabetes looks significantly improved! A1C down to 7.0; cholesterol improving; labs stable; follow up after his 10 day quarantine from COVID

## 2022-01-26 ENCOUNTER — OFFICE VISIT (OUTPATIENT)
Dept: FAMILY MEDICINE | Facility: CLINIC | Age: 62
End: 2022-01-26
Payer: MEDICAID

## 2022-01-26 VITALS
HEART RATE: 89 BPM | HEIGHT: 72 IN | RESPIRATION RATE: 18 BRPM | SYSTOLIC BLOOD PRESSURE: 138 MMHG | BODY MASS INDEX: 30.23 KG/M2 | TEMPERATURE: 99 F | DIASTOLIC BLOOD PRESSURE: 90 MMHG | OXYGEN SATURATION: 98 % | WEIGHT: 223.19 LBS

## 2022-01-26 DIAGNOSIS — Z91.09 ENVIRONMENTAL ALLERGIES: ICD-10-CM

## 2022-01-26 DIAGNOSIS — E78.2 MIXED HYPERLIPIDEMIA: ICD-10-CM

## 2022-01-26 DIAGNOSIS — I10 ESSENTIAL HYPERTENSION: ICD-10-CM

## 2022-01-26 DIAGNOSIS — E11.65 TYPE 2 DIABETES MELLITUS WITH HYPERGLYCEMIA, WITHOUT LONG-TERM CURRENT USE OF INSULIN: Primary | ICD-10-CM

## 2022-01-26 PROCEDURE — 1160F RVW MEDS BY RX/DR IN RCRD: CPT | Mod: ,,, | Performed by: NURSE PRACTITIONER

## 2022-01-26 PROCEDURE — 3008F PR BODY MASS INDEX (BMI) DOCUMENTED: ICD-10-PCS | Mod: ,,, | Performed by: NURSE PRACTITIONER

## 2022-01-26 PROCEDURE — 3066F NEPHROPATHY DOC TX: CPT | Mod: ,,, | Performed by: NURSE PRACTITIONER

## 2022-01-26 PROCEDURE — 99214 OFFICE O/P EST MOD 30 MIN: CPT | Mod: S$PBB,,, | Performed by: NURSE PRACTITIONER

## 2022-01-26 PROCEDURE — 3080F DIAST BP >= 90 MM HG: CPT | Mod: ,,, | Performed by: NURSE PRACTITIONER

## 2022-01-26 PROCEDURE — 3080F PR MOST RECENT DIASTOLIC BLOOD PRESSURE >= 90 MM HG: ICD-10-PCS | Mod: ,,, | Performed by: NURSE PRACTITIONER

## 2022-01-26 PROCEDURE — 1160F PR REVIEW ALL MEDS BY PRESCRIBER/CLIN PHARMACIST DOCUMENTED: ICD-10-PCS | Mod: ,,, | Performed by: NURSE PRACTITIONER

## 2022-01-26 PROCEDURE — 3075F PR MOST RECENT SYSTOLIC BLOOD PRESS GE 130-139MM HG: ICD-10-PCS | Mod: ,,, | Performed by: NURSE PRACTITIONER

## 2022-01-26 PROCEDURE — 3061F PR NEG MICROALBUMINURIA RESULT DOCUMENTED/REVIEW: ICD-10-PCS | Mod: ,,, | Performed by: NURSE PRACTITIONER

## 2022-01-26 PROCEDURE — 3051F HG A1C>EQUAL 7.0%<8.0%: CPT | Mod: ,,, | Performed by: NURSE PRACTITIONER

## 2022-01-26 PROCEDURE — 3051F PR MOST RECENT HEMOGLOBIN A1C LEVEL 7.0 - < 8.0%: ICD-10-PCS | Mod: ,,, | Performed by: NURSE PRACTITIONER

## 2022-01-26 PROCEDURE — 3061F NEG MICROALBUMINURIA REV: CPT | Mod: ,,, | Performed by: NURSE PRACTITIONER

## 2022-01-26 PROCEDURE — 99214 PR OFFICE/OUTPT VISIT, EST, LEVL IV, 30-39 MIN: ICD-10-PCS | Mod: S$PBB,,, | Performed by: NURSE PRACTITIONER

## 2022-01-26 PROCEDURE — 3075F SYST BP GE 130 - 139MM HG: CPT | Mod: ,,, | Performed by: NURSE PRACTITIONER

## 2022-01-26 PROCEDURE — 3008F BODY MASS INDEX DOCD: CPT | Mod: ,,, | Performed by: NURSE PRACTITIONER

## 2022-01-26 PROCEDURE — 99215 OFFICE O/P EST HI 40 MIN: CPT | Performed by: NURSE PRACTITIONER

## 2022-01-26 PROCEDURE — 3066F PR DOCUMENTATION OF TREATMENT FOR NEPHROPATHY: ICD-10-PCS | Mod: ,,, | Performed by: NURSE PRACTITIONER

## 2022-01-26 RX ORDER — ATORVASTATIN CALCIUM 20 MG/1
20 TABLET, FILM COATED ORAL NIGHTLY
Qty: 90 TABLET | Refills: 1 | Status: SHIPPED | OUTPATIENT
Start: 2022-01-26 | End: 2022-07-27 | Stop reason: SDUPTHER

## 2022-01-26 NOTE — PROGRESS NOTES
SUBJECTIVE:      Patient ID: Gagan Escobar is a 61 y.o. male.    Chief Complaint: Diabetes, Hypertension, and Hyperlipidemia    Established patient here for follow-up on diabetes, HTN and HLD.  His A1c and blood sugar is continuing to come down- A1c down to 7.0!  Lab results reviewed with pt. He is taking all of his medications as prescribed and has been trying to eat better since his last visit. Nausea has resolved with Trulicity- declines increase in dose. Needs GI eval for positive Cologuard- will schedule.     Diabetes  He presents for his follow-up diabetic visit. He has type 2 diabetes mellitus. His disease course has been improving. There are no hypoglycemic associated symptoms. Pertinent negatives for hypoglycemia include no dizziness, headaches, nervousness/anxiousness, pallor or sweats. Pertinent negatives for diabetes include no blurred vision, no chest pain, no fatigue, no foot paresthesias, no foot ulcerations, no polydipsia, no polyphagia, no polyuria, no visual change, no weakness and no weight loss. There are no hypoglycemic complications. Symptoms are improving. Diabetic complications include impotence and peripheral neuropathy. Pertinent negatives for diabetic complications include no CVA. Risk factors for coronary artery disease include diabetes mellitus, dyslipidemia, family history, male sex, hypertension, obesity and sedentary lifestyle. Current diabetic treatment includes oral agent (monotherapy), diet and insulin injections (Trulicity ). He is compliant with treatment all of the time. His weight is decreasing steadily. He is following a generally healthy diet. Meal planning includes avoidance of concentrated sweets. He has not had a previous visit with a dietitian. He rarely participates in exercise. His home blood glucose trend is decreasing steadily. His lunch blood glucose range is generally 140-180 mg/dl. An ACE inhibitor/angiotensin II receptor blocker is being taken. He does  not see a podiatrist.Eye exam is current.   Hypertension  This is a chronic problem. The current episode started more than 1 month ago. The problem has been waxing and waning since onset. The problem is uncontrolled. Pertinent negatives include no anxiety, blurred vision, chest pain, headaches, malaise/fatigue, palpitations, peripheral edema, shortness of breath or sweats. There are no associated agents to hypertension. Risk factors for coronary artery disease include obesity, male gender, family history, dyslipidemia and diabetes mellitus. Past treatments include angiotensin blockers and lifestyle changes. The current treatment provides moderate improvement. Compliance problems include exercise and diet.  There is no history of angina, kidney disease, CAD/MI, CVA or heart failure. There is no history of chronic renal disease, sleep apnea or a thyroid problem.   Hyperlipidemia  This is a chronic problem. The current episode started more than 1 year ago. The problem is controlled. Recent lipid tests were reviewed and are normal. Exacerbating diseases include obesity. He has no history of chronic renal disease, diabetes, hypothyroidism or liver disease. There are no known factors aggravating his hyperlipidemia. Pertinent negatives include no chest pain, leg pain, myalgias or shortness of breath. Current antihyperlipidemic treatment includes statins and diet change. The current treatment provides moderate improvement of lipids. Compliance problems include adherence to diet and adherence to exercise.  Risk factors for coronary artery disease include hypertension, male sex, obesity, diabetes mellitus, dyslipidemia and family history.       Family History   Problem Relation Age of Onset    Heart disease Mother     Mental illness Mother     Asthma Father       Social History     Socioeconomic History    Marital status:    Tobacco Use    Smoking status: Never Smoker    Smokeless tobacco: Current User  "  Substance and Sexual Activity    Alcohol use: Yes    Drug use: No    Sexual activity: Yes     Current Outpatient Medications   Medication Sig Dispense Refill    aspirin (ECOTRIN) 81 MG EC tablet Take 1 tablet (81 mg total) by mouth once daily. 90 tablet 1    cetirizine (ZYRTEC) 10 MG tablet Take 1 tablet (10 mg total) by mouth once daily. 90 tablet 1    fluticasone propionate (FLONASE) 50 mcg/actuation nasal spray 1 spray (50 mcg total) by Each Nostril route once daily. 11.1 mL 0    LANTUS SOLOSTAR U-100 INSULIN glargine 100 units/mL (3mL) SubQ pen ADMINISTER 30 UNITS UNDER THE SKIN EVERY EVENING 18 mL 2    metFORMIN (GLUCOPHAGE-XR) 500 MG ER 24hr tablet Take 2 tablets (1,000 mg total) by mouth daily with breakfast. 180 tablet 0    TRULICITY 0.75 mg/0.5 mL pen injector Inject 0.75 mg into the skin every 7 days. 4 pen 5    valsartan (DIOVAN) 160 MG tablet TAKE 1 TABLET BY MOUTH EVERY DAY 90 tablet 1    atorvastatin (LIPITOR) 20 MG tablet Take 1 tablet (20 mg total) by mouth every evening. 90 tablet 1    blood sugar diagnostic Strp To check BG two times daily, to use with insurance preferred meter 50 strip 5    blood sugar diagnostic Strp To check BG 2 times daily, to use with insurance preferred meter 100 strip 5    lancets Misc To check BG 2 times daily, to use with insurance preferred meter 100 each 5    pen needle, diabetic (PEN NEEDLE) 31 gauge x 5/16" Ndle Use 1 pen needle daily to administer insulin 50 each 5     No current facility-administered medications for this visit.     Review of patient's allergies indicates:   Allergen Reactions    Ace inhibitors Rash    Pcn [penicillins]       Past Medical History:   Diagnosis Date    Diabetes mellitus type 2 with complications     Hyperlipidemia     Hypertension      Past Surgical History:   Procedure Laterality Date    FOOT SURGERY      NECK SURGERY  02/2020       Review of Systems   Constitutional: Negative for activity change, appetite " change, chills, diaphoresis, fatigue, fever, malaise/fatigue, unexpected weight change and weight loss.   HENT: Negative for congestion, ear pain, rhinorrhea and sore throat.    Eyes: Negative for blurred vision, pain and visual disturbance.   Respiratory: Negative for cough and shortness of breath.    Cardiovascular: Negative for chest pain, palpitations and leg swelling.   Gastrointestinal: Negative for abdominal pain, blood in stool, constipation, diarrhea, nausea and vomiting.   Endocrine: Negative for cold intolerance, heat intolerance, polydipsia, polyphagia and polyuria.   Genitourinary: Positive for impotence. Negative for dysuria, frequency, hematuria and urgency.   Musculoskeletal: Negative for myalgias.   Skin: Negative for color change, pallor, rash and wound.   Allergic/Immunologic: Positive for environmental allergies.   Neurological: Negative for dizziness, weakness, numbness and headaches.   Hematological: Negative for adenopathy. Does not bruise/bleed easily.   Psychiatric/Behavioral: Negative for dysphoric mood, sleep disturbance and suicidal ideas. The patient is not nervous/anxious.       OBJECTIVE:      Vitals:    01/26/22 1357 01/26/22 1404   BP: (!) 140/96 (!) 138/90   BP Location: Left arm Left arm   Patient Position: Sitting Sitting   BP Method: Large (Manual) Large (Manual)   Pulse: 89    Resp: 18    Temp: 98.7 °F (37.1 °C)    TempSrc: Oral    SpO2: 98%    Weight: 101.2 kg (223 lb 3.2 oz)    Height: 6' (1.829 m)      Physical Exam  Vitals and nursing note reviewed. Exam conducted with a chaperone present.   Constitutional:       General: He is not in acute distress.     Appearance: Normal appearance. He is well-developed. He is obese. He is not ill-appearing.   HENT:      Head: Normocephalic.      Mouth/Throat:      Mouth: Mucous membranes are moist.   Eyes:      General: No scleral icterus.     Pupils: Pupils are equal, round, and reactive to light.   Neck:      Thyroid: No thyroid mass or  thyromegaly.      Trachea: Trachea normal.   Cardiovascular:      Rate and Rhythm: Normal rate and regular rhythm.      Heart sounds: Normal heart sounds. No murmur heard.      Pulmonary:      Effort: Pulmonary effort is normal. No respiratory distress.      Breath sounds: Normal breath sounds. No wheezing or rales.   Abdominal:      General: Bowel sounds are normal.      Palpations: Abdomen is soft.   Musculoskeletal:         General: No swelling. Normal range of motion.      Cervical back: Normal range of motion and neck supple.   Lymphadenopathy:      Cervical: No cervical adenopathy.   Skin:     General: Skin is warm and dry.      Coloration: Skin is not jaundiced or pale.   Neurological:      Mental Status: He is alert and oriented to person, place, and time.   Psychiatric:         Mood and Affect: Mood normal.         Behavior: Behavior normal.         Thought Content: Thought content normal.         Judgment: Judgment normal.        Assessment:       1. Type 2 diabetes mellitus with hyperglycemia, without long-term current use of insulin    2. Essential hypertension    3. Mixed hyperlipidemia    4. Environmental allergies        Plan:       Type 2 diabetes mellitus with hyperglycemia, without long-term current use of insulin   *A1C 7.0; cont meds, diet and wt loss; recheck in 3 mo     Essential hypertension   -slightly elevated today; will monitor at home and RTC in 2 weeks if not below goal    Mixed hyperlipidemia  -     atorvastatin (LIPITOR) 20 MG tablet; Take 1 tablet (20 mg total) by mouth every evening.  Dispense: 90 tablet; Refill: 1   -stable     Environmental allergies   -stable       Follow up in about 3 months (around 4/26/2022) for diabetes, HTN-A1C in office .      1/26/2022 PRIMO Zuniga, FNP    This note was created using [employer] voice recognition software that occasionally misinterprets phrases or words.

## 2022-04-27 ENCOUNTER — OFFICE VISIT (OUTPATIENT)
Dept: FAMILY MEDICINE | Facility: CLINIC | Age: 62
End: 2022-04-27
Payer: MEDICAID

## 2022-04-27 VITALS
BODY MASS INDEX: 31.36 KG/M2 | HEIGHT: 72 IN | DIASTOLIC BLOOD PRESSURE: 102 MMHG | SYSTOLIC BLOOD PRESSURE: 150 MMHG | RESPIRATION RATE: 20 BRPM | OXYGEN SATURATION: 97 % | WEIGHT: 231.5 LBS | TEMPERATURE: 99 F | HEART RATE: 94 BPM

## 2022-04-27 DIAGNOSIS — I10 ESSENTIAL HYPERTENSION: ICD-10-CM

## 2022-04-27 DIAGNOSIS — E78.2 MIXED HYPERLIPIDEMIA: ICD-10-CM

## 2022-04-27 DIAGNOSIS — E11.65 TYPE 2 DIABETES MELLITUS WITH HYPERGLYCEMIA, WITHOUT LONG-TERM CURRENT USE OF INSULIN: Primary | ICD-10-CM

## 2022-04-27 LAB — HBA1C MFR BLD: 7 %

## 2022-04-27 PROCEDURE — 1160F RVW MEDS BY RX/DR IN RCRD: CPT | Mod: ,,, | Performed by: NURSE PRACTITIONER

## 2022-04-27 PROCEDURE — 3051F HG A1C>EQUAL 7.0%<8.0%: CPT | Mod: ,,, | Performed by: NURSE PRACTITIONER

## 2022-04-27 PROCEDURE — 3080F PR MOST RECENT DIASTOLIC BLOOD PRESSURE >= 90 MM HG: ICD-10-PCS | Mod: ,,, | Performed by: NURSE PRACTITIONER

## 2022-04-27 PROCEDURE — 3066F NEPHROPATHY DOC TX: CPT | Mod: ,,, | Performed by: NURSE PRACTITIONER

## 2022-04-27 PROCEDURE — 4010F PR ACE/ARB THEARPY RXD/TAKEN: ICD-10-PCS | Mod: ,,, | Performed by: NURSE PRACTITIONER

## 2022-04-27 PROCEDURE — 99213 PR OFFICE/OUTPT VISIT, EST, LEVL III, 20-29 MIN: ICD-10-PCS | Mod: S$PBB,,, | Performed by: NURSE PRACTITIONER

## 2022-04-27 PROCEDURE — 3051F PR MOST RECENT HEMOGLOBIN A1C LEVEL 7.0 - < 8.0%: ICD-10-PCS | Mod: ,,, | Performed by: NURSE PRACTITIONER

## 2022-04-27 PROCEDURE — 4010F ACE/ARB THERAPY RXD/TAKEN: CPT | Mod: ,,, | Performed by: NURSE PRACTITIONER

## 2022-04-27 PROCEDURE — 3077F SYST BP >= 140 MM HG: CPT | Mod: ,,, | Performed by: NURSE PRACTITIONER

## 2022-04-27 PROCEDURE — 99215 OFFICE O/P EST HI 40 MIN: CPT | Performed by: NURSE PRACTITIONER

## 2022-04-27 PROCEDURE — 3061F PR NEG MICROALBUMINURIA RESULT DOCUMENTED/REVIEW: ICD-10-PCS | Mod: ,,, | Performed by: NURSE PRACTITIONER

## 2022-04-27 PROCEDURE — 3008F PR BODY MASS INDEX (BMI) DOCUMENTED: ICD-10-PCS | Mod: ,,, | Performed by: NURSE PRACTITIONER

## 2022-04-27 PROCEDURE — 1160F PR REVIEW ALL MEDS BY PRESCRIBER/CLIN PHARMACIST DOCUMENTED: ICD-10-PCS | Mod: ,,, | Performed by: NURSE PRACTITIONER

## 2022-04-27 PROCEDURE — 83036 HEMOGLOBIN GLYCOSYLATED A1C: CPT | Mod: PBBFAC | Performed by: NURSE PRACTITIONER

## 2022-04-27 PROCEDURE — 3008F BODY MASS INDEX DOCD: CPT | Mod: ,,, | Performed by: NURSE PRACTITIONER

## 2022-04-27 PROCEDURE — 99213 OFFICE O/P EST LOW 20 MIN: CPT | Mod: S$PBB,,, | Performed by: NURSE PRACTITIONER

## 2022-04-27 PROCEDURE — 3077F PR MOST RECENT SYSTOLIC BLOOD PRESSURE >= 140 MM HG: ICD-10-PCS | Mod: ,,, | Performed by: NURSE PRACTITIONER

## 2022-04-27 PROCEDURE — 3066F PR DOCUMENTATION OF TREATMENT FOR NEPHROPATHY: ICD-10-PCS | Mod: ,,, | Performed by: NURSE PRACTITIONER

## 2022-04-27 PROCEDURE — 3061F NEG MICROALBUMINURIA REV: CPT | Mod: ,,, | Performed by: NURSE PRACTITIONER

## 2022-04-27 PROCEDURE — 3080F DIAST BP >= 90 MM HG: CPT | Mod: ,,, | Performed by: NURSE PRACTITIONER

## 2022-04-27 NOTE — PROGRESS NOTES
SUBJECTIVE:      Patient ID: Gagan Escobar is a 62 y.o. male.    Chief Complaint: Hypertension and Diabetes    Marvin is here today for follow-up on diabetes and hypertension.  He had a colonoscopy 2 weeks ago and was found to have 3 polyps- recommended repeat colonoscopy in 3 years.  He did hold some of his medications for the procedure and admits he did not restart them right away.  He missed about a week his medications before resuming them again.  He does admit he was not feeling well while off of his medications and is highly motivated to restart his treatment plan.  A1c is 7.0 today in clinic which is unchanged from last visit despite his 7 lb weight gain.  Blood pressure is slightly elevated but he denies any complaints of headache, chest pain, edema, shortness of breath, or palpitations.  Admits he ate boiled seafood last night. He will monitor his blood pressure at home and return to clinic if needed for elevated readings      Family History   Problem Relation Age of Onset    Heart disease Mother     Mental illness Mother     Asthma Father       Social History     Socioeconomic History    Marital status:    Tobacco Use    Smoking status: Never Smoker    Smokeless tobacco: Current User   Substance and Sexual Activity    Alcohol use: Yes    Drug use: No    Sexual activity: Yes     Current Outpatient Medications   Medication Sig Dispense Refill    aspirin (ECOTRIN) 81 MG EC tablet Take 1 tablet (81 mg total) by mouth once daily. 90 tablet 1    atorvastatin (LIPITOR) 20 MG tablet Take 1 tablet (20 mg total) by mouth every evening. 90 tablet 1    cetirizine (ZYRTEC) 10 MG tablet Take 1 tablet (10 mg total) by mouth once daily. 90 tablet 1    fluticasone propionate (FLONASE) 50 mcg/actuation nasal spray 1 spray (50 mcg total) by Each Nostril route once daily. 11.1 mL 0    LANTUS SOLOSTAR U-100 INSULIN glargine 100 units/mL (3mL) SubQ pen ADMINISTER 30 UNITS UNDER THE SKIN EVERY  "EVENING 18 mL 2    metFORMIN (GLUCOPHAGE-XR) 500 MG ER 24hr tablet TAKE 2 TABLETS(1000 MG) BY MOUTH DAILY WITH BREAKFAST 180 tablet 1    TRULICITY 0.75 mg/0.5 mL pen injector Inject 0.75 mg into the skin every 7 days. 4 pen 5    valsartan (DIOVAN) 160 MG tablet TAKE 1 TABLET BY MOUTH EVERY DAY 90 tablet 1    blood sugar diagnostic Strp To check BG two times daily, to use with insurance preferred meter 50 strip 5    blood sugar diagnostic Strp To check BG 2 times daily, to use with insurance preferred meter 100 strip 5    lancets Misc To check BG 2 times daily, to use with insurance preferred meter 100 each 5    pen needle, diabetic (PEN NEEDLE) 31 gauge x 5/16" Ndle Use 1 pen needle daily to administer insulin 50 each 5     No current facility-administered medications for this visit.     Review of patient's allergies indicates:   Allergen Reactions    Ace inhibitors Rash    Pcn [penicillins]       Past Medical History:   Diagnosis Date    Diabetes mellitus type 2 with complications     Hyperlipidemia     Hypertension      Past Surgical History:   Procedure Laterality Date    FOOT SURGERY      NECK SURGERY  02/2020       Review of Systems   Constitutional: Negative for chills, fatigue, fever and unexpected weight change.   HENT: Negative for congestion and sore throat.    Eyes: Negative for pain and visual disturbance.   Respiratory: Negative for cough and shortness of breath.    Cardiovascular: Negative for chest pain, palpitations and leg swelling.   Gastrointestinal: Negative for abdominal pain, blood in stool, diarrhea, nausea and vomiting.   Endocrine: Negative for cold intolerance, heat intolerance, polydipsia, polyphagia and polyuria.   Genitourinary: Negative for dysuria, frequency, hematuria and urgency.   Musculoskeletal: Negative for myalgias.   Skin: Negative for color change, pallor, rash and wound.   Allergic/Immunologic: Positive for environmental allergies.   Neurological: Negative for " dizziness, weakness, numbness and headaches.   Hematological: Negative for adenopathy. Does not bruise/bleed easily.   Psychiatric/Behavioral: Negative for dysphoric mood, sleep disturbance and suicidal ideas. The patient is not nervous/anxious.       OBJECTIVE:      Vitals:    04/27/22 1455 04/27/22 1459   BP: (!) 162/100 (!) 150/102   BP Location: Left arm Left arm   Patient Position: Sitting Sitting   BP Method: Large (Manual) Large (Manual)   Pulse: 94    Resp: 20    Temp: 98.7 °F (37.1 °C)    TempSrc: Oral    SpO2: 97%    Weight: 105 kg (231 lb 8 oz)    Height: 6' (1.829 m)      Physical Exam  Vitals and nursing note reviewed. Exam conducted with a chaperone present.   Constitutional:       General: He is not in acute distress.     Appearance: Normal appearance. He is well-developed. He is obese. He is not ill-appearing.   HENT:      Head: Normocephalic.   Eyes:      General: No scleral icterus.     Pupils: Pupils are equal, round, and reactive to light.   Neck:      Thyroid: No thyroid mass or thyromegaly.      Trachea: Trachea normal.   Cardiovascular:      Rate and Rhythm: Normal rate and regular rhythm.      Heart sounds: Normal heart sounds. No murmur heard.  Pulmonary:      Effort: Pulmonary effort is normal. No respiratory distress.      Breath sounds: Normal breath sounds. No wheezing or rales.   Musculoskeletal:         General: Normal range of motion.      Cervical back: Normal range of motion and neck supple.      Right lower leg: No edema.      Left lower leg: No edema.   Lymphadenopathy:      Cervical: No cervical adenopathy.   Skin:     General: Skin is warm and dry.      Coloration: Skin is not jaundiced or pale.   Neurological:      Mental Status: He is alert and oriented to person, place, and time.   Psychiatric:         Mood and Affect: Mood normal.         Behavior: Behavior normal.         Thought Content: Thought content normal.         Judgment: Judgment normal.        Assessment:       1.  Type 2 diabetes mellitus with hyperglycemia, without long-term current use of insulin    2. Essential hypertension    3. Mixed hyperlipidemia        Plan:       Type 2 diabetes mellitus with hyperglycemia, without long-term current use of insulin  -     Hemoglobin A1C, POCT (7.0- stable)   -     Comprehensive Metabolic Panel; Future; Expected date: 07/29/2022  -     Hemoglobin A1C; Future; Expected date: 07/29/2022   -encourage patient to get back on diet and weight loss plan with medications    Essential hypertension   -above goal     Mixed hyperlipidemia  -     Comprehensive Metabolic Panel; Future; Expected date: 07/29/2022  -     Lipid Panel; Future; Expected date: 07/29/2022        Follow up in about 3 months (around 7/27/2022) for diabetes, HTN.      4/27/2022 Meri Galeano, PRIMO, FNP    This note was created using [employer] voice recognition software that occasionally misinterprets phrases or words.

## 2022-05-16 DIAGNOSIS — I10 ESSENTIAL HYPERTENSION: Primary | ICD-10-CM

## 2022-05-16 RX ORDER — VALSARTAN 160 MG/1
TABLET ORAL
Qty: 90 TABLET | Refills: 1 | Status: SHIPPED | OUTPATIENT
Start: 2022-05-16 | End: 2023-01-10 | Stop reason: SDUPTHER

## 2022-06-22 DIAGNOSIS — E11.65 TYPE 2 DIABETES MELLITUS WITH HYPERGLYCEMIA, WITHOUT LONG-TERM CURRENT USE OF INSULIN: ICD-10-CM

## 2022-06-22 RX ORDER — METFORMIN HYDROCHLORIDE 500 MG/1
TABLET, EXTENDED RELEASE ORAL
Qty: 180 TABLET | Refills: 1 | Status: SHIPPED | OUTPATIENT
Start: 2022-06-22 | End: 2023-01-10 | Stop reason: SDUPTHER

## 2022-07-25 ENCOUNTER — LAB VISIT (OUTPATIENT)
Dept: LAB | Facility: HOSPITAL | Age: 62
End: 2022-07-25
Attending: NURSE PRACTITIONER
Payer: MEDICAID

## 2022-07-25 DIAGNOSIS — E78.2 MIXED HYPERLIPIDEMIA: ICD-10-CM

## 2022-07-25 DIAGNOSIS — E11.65 TYPE 2 DIABETES MELLITUS WITH HYPERGLYCEMIA, WITHOUT LONG-TERM CURRENT USE OF INSULIN: ICD-10-CM

## 2022-07-25 LAB
ALBUMIN SERPL BCP-MCNC: 4 G/DL (ref 3.5–5.2)
ALP SERPL-CCNC: 46 U/L (ref 55–135)
ALT SERPL W/O P-5'-P-CCNC: 30 U/L (ref 10–44)
ANION GAP SERPL CALC-SCNC: 6 MMOL/L (ref 8–16)
AST SERPL-CCNC: 23 U/L (ref 10–40)
BILIRUB SERPL-MCNC: 1.1 MG/DL (ref 0.1–1)
BUN SERPL-MCNC: 13 MG/DL (ref 6–20)
CALCIUM SERPL-MCNC: 9 MG/DL (ref 8.7–10.5)
CHLORIDE SERPL-SCNC: 103 MMOL/L (ref 95–110)
CHOLEST SERPL-MCNC: 187 MG/DL (ref 120–199)
CHOLEST/HDLC SERPL: 4.2 {RATIO} (ref 2–5)
CO2 SERPL-SCNC: 26 MMOL/L (ref 23–29)
CREAT SERPL-MCNC: 0.9 MG/DL (ref 0.5–1.4)
EST. GFR  (AFRICAN AMERICAN): >60 ML/MIN/1.73 M^2
EST. GFR  (NON AFRICAN AMERICAN): >60 ML/MIN/1.73 M^2
ESTIMATED AVG GLUCOSE: 171 MG/DL (ref 68–131)
GLUCOSE SERPL-MCNC: 127 MG/DL (ref 70–110)
HBA1C MFR BLD: 7.6 % (ref 4.5–6.2)
HDLC SERPL-MCNC: 45 MG/DL (ref 40–75)
HDLC SERPL: 24.1 % (ref 20–50)
LDLC SERPL CALC-MCNC: 104.4 MG/DL (ref 63–159)
NONHDLC SERPL-MCNC: 142 MG/DL
POTASSIUM SERPL-SCNC: 4.2 MMOL/L (ref 3.5–5.1)
PROT SERPL-MCNC: 6.8 G/DL (ref 6–8.4)
SODIUM SERPL-SCNC: 135 MMOL/L (ref 136–145)
TRIGL SERPL-MCNC: 188 MG/DL (ref 30–150)

## 2022-07-25 PROCEDURE — 80061 LIPID PANEL: CPT | Performed by: NURSE PRACTITIONER

## 2022-07-25 PROCEDURE — 83036 HEMOGLOBIN GLYCOSYLATED A1C: CPT | Performed by: NURSE PRACTITIONER

## 2022-07-25 PROCEDURE — 36415 COLL VENOUS BLD VENIPUNCTURE: CPT | Performed by: NURSE PRACTITIONER

## 2022-07-25 PROCEDURE — 80053 COMPREHEN METABOLIC PANEL: CPT | Performed by: NURSE PRACTITIONER

## 2022-07-26 ENCOUNTER — TELEPHONE (OUTPATIENT)
Dept: FAMILY MEDICINE | Facility: CLINIC | Age: 62
End: 2022-07-26

## 2022-07-26 NOTE — TELEPHONE ENCOUNTER
----- Message from MARCOS Duncan sent at 7/26/2022  7:54 AM CDT -----  A1C up to 7.6- slightly worse; otherwise labs ok; follow up tomorrow as planned

## 2022-07-26 NOTE — TELEPHONE ENCOUNTER
Called and relayed the results of the labs to the patient. The patient expressed verbal understanding.

## 2022-07-27 ENCOUNTER — OFFICE VISIT (OUTPATIENT)
Dept: FAMILY MEDICINE | Facility: CLINIC | Age: 62
End: 2022-07-27
Payer: MEDICAID

## 2022-07-27 VITALS
OXYGEN SATURATION: 97 % | SYSTOLIC BLOOD PRESSURE: 130 MMHG | DIASTOLIC BLOOD PRESSURE: 88 MMHG | BODY MASS INDEX: 30.73 KG/M2 | RESPIRATION RATE: 20 BRPM | HEART RATE: 91 BPM | WEIGHT: 226.88 LBS | HEIGHT: 72 IN | TEMPERATURE: 99 F

## 2022-07-27 DIAGNOSIS — E11.65 TYPE 2 DIABETES MELLITUS WITH HYPERGLYCEMIA, WITHOUT LONG-TERM CURRENT USE OF INSULIN: ICD-10-CM

## 2022-07-27 DIAGNOSIS — E78.2 MIXED HYPERLIPIDEMIA: ICD-10-CM

## 2022-07-27 PROCEDURE — 3079F DIAST BP 80-89 MM HG: CPT | Mod: CPTII,,, | Performed by: NURSE PRACTITIONER

## 2022-07-27 PROCEDURE — 3075F PR MOST RECENT SYSTOLIC BLOOD PRESS GE 130-139MM HG: ICD-10-PCS | Mod: CPTII,,, | Performed by: NURSE PRACTITIONER

## 2022-07-27 PROCEDURE — 4010F ACE/ARB THERAPY RXD/TAKEN: CPT | Mod: CPTII,,, | Performed by: NURSE PRACTITIONER

## 2022-07-27 PROCEDURE — 3008F PR BODY MASS INDEX (BMI) DOCUMENTED: ICD-10-PCS | Mod: CPTII,,, | Performed by: NURSE PRACTITIONER

## 2022-07-27 PROCEDURE — 99214 OFFICE O/P EST MOD 30 MIN: CPT | Performed by: NURSE PRACTITIONER

## 2022-07-27 PROCEDURE — 3051F PR MOST RECENT HEMOGLOBIN A1C LEVEL 7.0 - < 8.0%: ICD-10-PCS | Mod: CPTII,,, | Performed by: NURSE PRACTITIONER

## 2022-07-27 PROCEDURE — 1159F PR MEDICATION LIST DOCUMENTED IN MEDICAL RECORD: ICD-10-PCS | Mod: CPTII,,, | Performed by: NURSE PRACTITIONER

## 2022-07-27 PROCEDURE — 3066F PR DOCUMENTATION OF TREATMENT FOR NEPHROPATHY: ICD-10-PCS | Mod: CPTII,,, | Performed by: NURSE PRACTITIONER

## 2022-07-27 PROCEDURE — 4010F PR ACE/ARB THEARPY RXD/TAKEN: ICD-10-PCS | Mod: CPTII,,, | Performed by: NURSE PRACTITIONER

## 2022-07-27 PROCEDURE — 99213 PR OFFICE/OUTPT VISIT, EST, LEVL III, 20-29 MIN: ICD-10-PCS | Mod: S$PBB,,, | Performed by: NURSE PRACTITIONER

## 2022-07-27 PROCEDURE — 3008F BODY MASS INDEX DOCD: CPT | Mod: CPTII,,, | Performed by: NURSE PRACTITIONER

## 2022-07-27 PROCEDURE — 3061F NEG MICROALBUMINURIA REV: CPT | Mod: CPTII,,, | Performed by: NURSE PRACTITIONER

## 2022-07-27 PROCEDURE — 3051F HG A1C>EQUAL 7.0%<8.0%: CPT | Mod: CPTII,,, | Performed by: NURSE PRACTITIONER

## 2022-07-27 PROCEDURE — 99213 OFFICE O/P EST LOW 20 MIN: CPT | Mod: S$PBB,,, | Performed by: NURSE PRACTITIONER

## 2022-07-27 PROCEDURE — 1159F MED LIST DOCD IN RCRD: CPT | Mod: CPTII,,, | Performed by: NURSE PRACTITIONER

## 2022-07-27 PROCEDURE — 3061F PR NEG MICROALBUMINURIA RESULT DOCUMENTED/REVIEW: ICD-10-PCS | Mod: CPTII,,, | Performed by: NURSE PRACTITIONER

## 2022-07-27 PROCEDURE — 3066F NEPHROPATHY DOC TX: CPT | Mod: CPTII,,, | Performed by: NURSE PRACTITIONER

## 2022-07-27 PROCEDURE — 3079F PR MOST RECENT DIASTOLIC BLOOD PRESSURE 80-89 MM HG: ICD-10-PCS | Mod: CPTII,,, | Performed by: NURSE PRACTITIONER

## 2022-07-27 PROCEDURE — 3075F SYST BP GE 130 - 139MM HG: CPT | Mod: CPTII,,, | Performed by: NURSE PRACTITIONER

## 2022-07-27 RX ORDER — ATORVASTATIN CALCIUM 20 MG/1
20 TABLET, FILM COATED ORAL NIGHTLY
Qty: 90 TABLET | Refills: 1 | Status: SHIPPED | OUTPATIENT
Start: 2022-07-27 | End: 2023-08-21

## 2022-07-27 RX ORDER — DULAGLUTIDE 1.5 MG/.5ML
1.5 INJECTION, SOLUTION SUBCUTANEOUS
Qty: 4 PEN | Refills: 5 | Status: SHIPPED | OUTPATIENT
Start: 2022-07-27 | End: 2023-04-18 | Stop reason: SDUPTHER

## 2022-07-27 NOTE — PROGRESS NOTES
SUBJECTIVE:      Patient ID: Gagan Escobar is a 62 y.o. male.    Chief Complaint: Hypertension and Diabetes    Established patient here for follow-up on diabetes and HLD.  Recent labs reviewed- A1C up to 7.6. Pharmacy has not been filling meds on time and he admits to cheating more. He is taking all of his medications as prescribed and has been trying to eat better since his last visit.     Diabetes  He presents for his follow-up diabetic visit. He has type 2 diabetes mellitus. His disease course has been worsening. There are no hypoglycemic associated symptoms. Pertinent negatives for hypoglycemia include no dizziness, headaches, nervousness/anxiousness or pallor. Pertinent negatives for diabetes include no blurred vision, no chest pain, no fatigue, no foot paresthesias, no foot ulcerations, no polydipsia, no polyphagia, no polyuria, no visual change, no weakness and no weight loss. There are no hypoglycemic complications. Symptoms are worsening. Diabetic complications include impotence and peripheral neuropathy. Risk factors for coronary artery disease include diabetes mellitus, dyslipidemia, family history, male sex, hypertension, obesity and sedentary lifestyle. Current diabetic treatment includes oral agent (monotherapy), diet and insulin injections (Trulicity ). He is compliant with treatment all of the time. His weight is decreasing steadily. He is following a generally healthy diet. Meal planning includes avoidance of concentrated sweets. He has not had a previous visit with a dietitian. He rarely participates in exercise. His home blood glucose trend is decreasing steadily. (120-170) An ACE inhibitor/angiotensin II receptor blocker is being taken. He does not see a podiatrist.Eye exam is not current (needs to schedule ).   Hyperlipidemia  This is a chronic problem. The current episode started more than 1 year ago. The problem is controlled. Recent lipid tests were reviewed and are normal.  "Exacerbating diseases include obesity. He has no history of chronic renal disease, diabetes, hypothyroidism or liver disease. There are no known factors aggravating his hyperlipidemia. Pertinent negatives include no chest pain, leg pain, myalgias or shortness of breath. Current antihyperlipidemic treatment includes statins and diet change. The current treatment provides moderate improvement of lipids. Compliance problems include adherence to diet and adherence to exercise.  Risk factors for coronary artery disease include hypertension, male sex, obesity, diabetes mellitus, dyslipidemia and family history.       Family History   Problem Relation Age of Onset    Heart disease Mother     Mental illness Mother     Asthma Father       Social History     Socioeconomic History    Marital status:    Tobacco Use    Smoking status: Never Smoker    Smokeless tobacco: Current User   Substance and Sexual Activity    Alcohol use: Yes    Drug use: No    Sexual activity: Yes     Current Outpatient Medications   Medication Sig Dispense Refill    aspirin (ECOTRIN) 81 MG EC tablet Take 1 tablet (81 mg total) by mouth once daily. 90 tablet 1    LANTUS SOLOSTAR U-100 INSULIN glargine 100 units/mL (3mL) SubQ pen ADMINISTER 30 UNITS UNDER THE SKIN EVERY EVENING 15 mL 5    metFORMIN (GLUCOPHAGE-XR) 500 MG ER 24hr tablet TAKE 2 TABLETS(1000 MG) BY MOUTH DAILY WITH BREAKFAST 180 tablet 1    valsartan (DIOVAN) 160 MG tablet TAKE 1 TABLET BY MOUTH EVERY DAY 90 tablet 1    atorvastatin (LIPITOR) 20 MG tablet Take 1 tablet (20 mg total) by mouth every evening. 90 tablet 1    dulaglutide (TRULICITY) 1.5 mg/0.5 mL pen injector Inject 1.5 mg into the skin every 7 days. 4 pen 5    lancets Misc To check BG 2 times daily, to use with insurance preferred meter 100 each 5    pen needle, diabetic (PEN NEEDLE) 31 gauge x 5/16" Ndle Use 1 pen needle daily to administer insulin 50 each 5     No current facility-administered " medications for this visit.     Review of patient's allergies indicates:   Allergen Reactions    Ace inhibitors Rash    Pcn [penicillins]       Past Medical History:   Diagnosis Date    Diabetes mellitus type 2 with complications     Hyperlipidemia     Hypertension      Past Surgical History:   Procedure Laterality Date    FOOT SURGERY      NECK SURGERY  02/2020       Review of Systems   Constitutional: Negative for appetite change, chills, fatigue, fever, unexpected weight change and weight loss.   HENT: Negative for congestion and sore throat.    Eyes: Negative for blurred vision, pain and visual disturbance.   Respiratory: Negative for cough and shortness of breath.    Cardiovascular: Negative for chest pain, palpitations and leg swelling.   Gastrointestinal: Negative for abdominal pain, blood in stool, diarrhea, nausea and vomiting.   Endocrine: Positive for heat intolerance. Negative for cold intolerance, polydipsia, polyphagia and polyuria.   Genitourinary: Positive for impotence. Negative for dysuria, frequency, hematuria and urgency.   Musculoskeletal: Negative for myalgias.   Skin: Negative for color change, pallor, rash and wound.   Allergic/Immunologic: Positive for environmental allergies.   Neurological: Negative for dizziness, weakness, numbness and headaches.   Hematological: Negative for adenopathy. Does not bruise/bleed easily.   Psychiatric/Behavioral: Negative for dysphoric mood, sleep disturbance and suicidal ideas. The patient is not nervous/anxious.       OBJECTIVE:      Vitals:    07/27/22 1520   BP: 130/88   BP Location: Left arm   Patient Position: Sitting   BP Method: Large (Manual)   Pulse: 91   Resp: 20   Temp: 98.6 °F (37 °C)   TempSrc: Oral   SpO2: 97%   Weight: 102.9 kg (226 lb 14.4 oz)   Height: 6' (1.829 m)     Physical Exam  Vitals and nursing note reviewed. Exam conducted with a chaperone present.   Constitutional:       General: He is not in acute distress.     Appearance:  Normal appearance. He is well-developed. He is obese. He is not ill-appearing.   HENT:      Head: Normocephalic.      Mouth/Throat:      Mouth: Mucous membranes are moist.   Eyes:      General: No scleral icterus.     Pupils: Pupils are equal, round, and reactive to light.   Neck:      Thyroid: No thyroid mass or thyromegaly.      Trachea: Trachea normal.   Cardiovascular:      Rate and Rhythm: Normal rate and regular rhythm.      Heart sounds: Normal heart sounds. No murmur heard.  Pulmonary:      Effort: Pulmonary effort is normal. No respiratory distress.      Breath sounds: Normal breath sounds. No wheezing or rales.   Musculoskeletal:         General: Normal range of motion.      Cervical back: Normal range of motion and neck supple.      Right lower leg: No edema.      Left lower leg: No edema.   Lymphadenopathy:      Cervical: No cervical adenopathy.   Skin:     General: Skin is warm and dry.      Coloration: Skin is not jaundiced or pale.   Neurological:      Mental Status: He is alert and oriented to person, place, and time.   Psychiatric:         Mood and Affect: Mood normal.         Behavior: Behavior normal.         Thought Content: Thought content normal.         Judgment: Judgment normal.        Assessment:       1. Type 2 diabetes mellitus with hyperglycemia, without long-term current use of insulin    2. Mixed hyperlipidemia        Plan:       Type 2 diabetes mellitus with hyperglycemia, without long-term current use of insulin  -     dulaglutide (TRULICITY) 1.5 mg/0.5 mL pen injector; Inject 1.5 mg into the skin every 7 days.  Dispense: 4 pen; Refill: 5   -increase to 1.5 trulicity weekly; recheck in 3 mo; encouraged compliance with diet and meds; get eye exam     Mixed hyperlipidemia  -     atorvastatin (LIPITOR) 20 MG tablet; Take 1 tablet (20 mg total) by mouth every evening.  Dispense: 90 tablet; Refill: 1   -stable      Follow up in about 3 months (around 10/27/2022) for diabetes, HTN.       7/27/2022 PRIMO Zuniga, FNP    This note was created using [employer] voice recognition software that occasionally misinterprets phrases or words.

## 2022-09-21 ENCOUNTER — HOSPITAL ENCOUNTER (EMERGENCY)
Facility: HOSPITAL | Age: 62
Discharge: HOME | End: 2022-09-21
Attending: EMERGENCY MEDICINE
Payer: MEDICAID

## 2022-09-21 VITALS
SYSTOLIC BLOOD PRESSURE: 137 MMHG | TEMPERATURE: 98 F | BODY MASS INDEX: 30.79 KG/M2 | WEIGHT: 227 LBS | HEART RATE: 100 BPM | DIASTOLIC BLOOD PRESSURE: 89 MMHG | OXYGEN SATURATION: 98 % | RESPIRATION RATE: 20 BRPM

## 2022-09-21 DIAGNOSIS — R14.0 ABDOMINAL DISTENSION: ICD-10-CM

## 2022-09-21 DIAGNOSIS — R10.9 LEFT FLANK PAIN: ICD-10-CM

## 2022-09-21 DIAGNOSIS — R10.30 LOWER ABDOMINAL PAIN: Primary | ICD-10-CM

## 2022-09-21 LAB
ALBUMIN SERPL BCP-MCNC: 4.4 G/DL (ref 3.5–5.2)
ALP SERPL-CCNC: 48 U/L (ref 55–135)
ALT SERPL W/O P-5'-P-CCNC: 33 U/L (ref 10–44)
ANION GAP SERPL CALC-SCNC: 5 MMOL/L (ref 8–16)
AST SERPL-CCNC: 31 U/L (ref 10–40)
BACTERIA #/AREA URNS HPF: NEGATIVE /HPF
BASOPHILS # BLD AUTO: 0.05 K/UL (ref 0–0.2)
BASOPHILS NFR BLD: 0.5 % (ref 0–1.9)
BILIRUB SERPL-MCNC: 1 MG/DL (ref 0.1–1)
BILIRUB UR QL STRIP: NEGATIVE
BUN SERPL-MCNC: 11 MG/DL (ref 6–20)
CALCIUM SERPL-MCNC: 9.3 MG/DL (ref 8.7–10.5)
CHLORIDE SERPL-SCNC: 104 MMOL/L (ref 95–110)
CLARITY UR: ABNORMAL
CO2 SERPL-SCNC: 25 MMOL/L (ref 23–29)
COLOR UR: YELLOW
CREAT SERPL-MCNC: 0.9 MG/DL (ref 0.5–1.4)
D DIMER PPP IA.FEU-MCNC: 0.31 UG/ML FEU
DIFFERENTIAL METHOD: ABNORMAL
EOSINOPHIL # BLD AUTO: 0.2 K/UL (ref 0–0.5)
EOSINOPHIL NFR BLD: 1.9 % (ref 0–8)
ERYTHROCYTE [DISTWIDTH] IN BLOOD BY AUTOMATED COUNT: 12.7 % (ref 11.5–14.5)
EST. GFR  (NO RACE VARIABLE): >60 ML/MIN/1.73 M^2
GLUCOSE SERPL-MCNC: 93 MG/DL (ref 70–110)
GLUCOSE UR QL STRIP: ABNORMAL
HCT VFR BLD AUTO: 46.2 % (ref 40–54)
HGB BLD-MCNC: 15.9 G/DL (ref 14–18)
HGB UR QL STRIP: NEGATIVE
HYALINE CASTS #/AREA URNS LPF: 5 /LPF
IMM GRANULOCYTES # BLD AUTO: 0.02 K/UL (ref 0–0.04)
IMM GRANULOCYTES NFR BLD AUTO: 0.2 % (ref 0–0.5)
KETONES UR QL STRIP: ABNORMAL
LEUKOCYTE ESTERASE UR QL STRIP: NEGATIVE
LIPASE SERPL-CCNC: 43 U/L (ref 4–60)
LYMPHOCYTES # BLD AUTO: 2.8 K/UL (ref 1–4.8)
LYMPHOCYTES NFR BLD: 29 % (ref 18–48)
MCH RBC QN AUTO: 30.9 PG (ref 27–31)
MCHC RBC AUTO-ENTMCNC: 34.4 G/DL (ref 32–36)
MCV RBC AUTO: 90 FL (ref 82–98)
MICROSCOPIC COMMENT: ABNORMAL
MONOCYTES # BLD AUTO: 0.9 K/UL (ref 0.3–1)
MONOCYTES NFR BLD: 8.9 % (ref 4–15)
NEUTROPHILS # BLD AUTO: 5.7 K/UL (ref 1.8–7.7)
NEUTROPHILS NFR BLD: 59.5 % (ref 38–73)
NITRITE UR QL STRIP: NEGATIVE
NRBC BLD-RTO: 0 /100 WBC
PH UR STRIP: 6 [PH] (ref 5–8)
PLATELET # BLD AUTO: 326 K/UL (ref 150–450)
PMV BLD AUTO: 8.9 FL (ref 9.2–12.9)
POTASSIUM SERPL-SCNC: 3.9 MMOL/L (ref 3.5–5.1)
PROT SERPL-MCNC: 7.6 G/DL (ref 6–8.4)
PROT UR QL STRIP: ABNORMAL
RBC # BLD AUTO: 5.14 M/UL (ref 4.6–6.2)
RBC #/AREA URNS HPF: 1 /HPF (ref 0–4)
SODIUM SERPL-SCNC: 134 MMOL/L (ref 136–145)
SP GR UR STRIP: >1.03 (ref 1–1.03)
SQUAMOUS #/AREA URNS HPF: 1 /HPF
TROPONIN I SERPL DL<=0.01 NG/ML-MCNC: <0.03 NG/ML
URN SPEC COLLECT METH UR: ABNORMAL
UROBILINOGEN UR STRIP-ACNC: NEGATIVE EU/DL
WBC # BLD AUTO: 9.57 K/UL (ref 3.9–12.7)
WBC #/AREA URNS HPF: 3 /HPF (ref 0–5)

## 2022-09-21 PROCEDURE — 85379 FIBRIN DEGRADATION QUANT: CPT | Performed by: EMERGENCY MEDICINE

## 2022-09-21 PROCEDURE — 80053 COMPREHEN METABOLIC PANEL: CPT | Performed by: EMERGENCY MEDICINE

## 2022-09-21 PROCEDURE — 99285 EMERGENCY DEPT VISIT HI MDM: CPT | Mod: 25

## 2022-09-21 PROCEDURE — 83690 ASSAY OF LIPASE: CPT | Performed by: EMERGENCY MEDICINE

## 2022-09-21 PROCEDURE — 81001 URINALYSIS AUTO W/SCOPE: CPT | Performed by: EMERGENCY MEDICINE

## 2022-09-21 PROCEDURE — 25500020 PHARM REV CODE 255: Performed by: EMERGENCY MEDICINE

## 2022-09-21 PROCEDURE — 85025 COMPLETE CBC W/AUTO DIFF WBC: CPT | Performed by: EMERGENCY MEDICINE

## 2022-09-21 PROCEDURE — 84484 ASSAY OF TROPONIN QUANT: CPT | Performed by: EMERGENCY MEDICINE

## 2022-09-21 RX ORDER — METHOCARBAMOL 750 MG/1
750 TABLET, FILM COATED ORAL 3 TIMES DAILY
Qty: 15 TABLET | Refills: 0 | Status: SHIPPED | OUTPATIENT
Start: 2022-09-21 | End: 2022-09-26

## 2022-09-21 RX ADMIN — IOHEXOL 100 ML: 350 INJECTION, SOLUTION INTRAVENOUS at 06:09

## 2022-09-21 NOTE — FIRST PROVIDER EVALUATION
Medical screening examination initiated.  I have conducted a focused provider triage encounter, findings are as follows:    Brief history of present illness:  Pain to the lower mid abdomen that radiates to the left back    Vitals:    09/21/22 1539   BP: (!) 141/106   Pulse: 106   Resp: 19   Temp: 97.6 °F (36.4 °C)   TempSrc: Oral   SpO2: 96%   Weight: 227 lb (103 kg)       Pertinent physical exam:  Patient complains of pain to the mid abdomen that radiates to the left lower aspect of the lumbar spine reports he has pain with breathing denies associated nausea vomiting or diarrhea denies urinary symptoms    Brief workup plan:  Labs, urine    Preliminary workup initiated; this workup will be continued and followed by the physician or advanced practice provider that is assigned to the patient when roomed.

## 2022-09-21 NOTE — ED PROVIDER NOTES
Encounter Date: 9/21/2022       History     Chief Complaint   Patient presents with    Flank Pain     Left sided x2 weeks.     62-year-old well-appearing male presents emergency department with complaint of pain that starts from the mid abdomen wraps around to his left flank area patient reports that he has associated nausea at times denies vomiting or diarrhea denies any urinary symptoms.  Patient is unsure of what makes symptoms better or worse he states sometimes the pain is worse when he bends over sometimes not he reports that the pain has been frequent in the morning time however dissipates around 9:00 p.m. he states he came to the emergency department today because it is been more frequent    Review of patient's allergies indicates:   Allergen Reactions    Ace inhibitors Rash    Pcn [penicillins]      Past Medical History:   Diagnosis Date    Diabetes mellitus type 2 with complications     Hyperlipidemia     Hypertension      Past Surgical History:   Procedure Laterality Date    FOOT SURGERY      NECK SURGERY  02/2020     Family History   Problem Relation Age of Onset    Heart disease Mother     Mental illness Mother     Asthma Father      Social History     Tobacco Use    Smoking status: Never    Smokeless tobacco: Current   Substance Use Topics    Alcohol use: Yes    Drug use: No     Review of Systems   Constitutional: Negative.    HENT: Negative.     Respiratory: Negative.     Cardiovascular: Negative.    Gastrointestinal:  Positive for abdominal pain.   Genitourinary: Negative.  Negative for flank pain and frequency.   Musculoskeletal: Negative.    Neurological: Negative.    Hematological: Negative.    Psychiatric/Behavioral: Negative.     All other systems reviewed and are negative.    Physical Exam     Initial Vitals [09/21/22 1539]   BP Pulse Resp Temp SpO2   (!) 141/106 106 19 97.6 °F (36.4 °C) 96 %      MAP       --         Physical Exam    Nursing note and vitals reviewed.  Constitutional: He  appears well-developed and well-nourished.   HENT:   Head: Normocephalic and atraumatic.   Eyes: EOM are normal. Pupils are equal, round, and reactive to light.   Neck: Neck supple.   Normal range of motion.  Cardiovascular:  Normal rate, regular rhythm, normal heart sounds and intact distal pulses.           Pulmonary/Chest: Breath sounds normal.   Abdominal: Abdomen is soft.   Musculoskeletal:         General: Normal range of motion.      Cervical back: Normal range of motion and neck supple.     Neurological: He is alert and oriented to person, place, and time. He has normal strength. GCS score is 15. GCS eye subscore is 4. GCS verbal subscore is 5. GCS motor subscore is 6.   Skin: Skin is warm and dry.   Psychiatric: He has a normal mood and affect.       ED Course   Procedures  Labs Reviewed   CBC W/ AUTO DIFFERENTIAL - Abnormal; Notable for the following components:       Result Value    MPV 8.9 (*)     All other components within normal limits   COMPREHENSIVE METABOLIC PANEL - Abnormal; Notable for the following components:    Sodium 134 (*)     Alkaline Phosphatase 48 (*)     Anion Gap 5 (*)     All other components within normal limits   URINALYSIS, REFLEX TO URINE CULTURE - Abnormal; Notable for the following components:    Appearance, UA Hazy (*)     Specific Gravity, UA >1.030 (*)     Protein, UA 1+ (*)     Glucose, UA Trace (*)     Ketones, UA 1+ (*)     All other components within normal limits    Narrative:     Specimen Source->Urine   URINALYSIS MICROSCOPIC - Abnormal; Notable for the following components:    Hyaline Casts, UA 5 (*)     All other components within normal limits    Narrative:     Specimen Source->Urine   LIPASE   TROPONIN I   D DIMER, QUANTITATIVE          Imaging Results              CT Abdomen Pelvis With Contrast (Final result)  Result time 09/21/22 18:44:34      Final result by Donnie Zendejas MD (09/21/22 18:44:34)                   Narrative:    CMS MANDATED QUALITY DATA - CT  RADIATION  436    All CT scans at this facility utilize dose modulation, iterative reconstruction, and/or weight based dosing when appropriate to reduce radiation dose to as low as reasonably achievable.    CT ABDOMEN PELVIS WITH IV CONTRAST    CLINICAL HISTORY:  62 years Male LLQ abdominal pain; Nausea/vomiting; Epigastric pain    COMPARISON: None    FINDINGS: Lung bases are clear. Bone window images demonstrate no acute or aggressive osseous abnormality. Degenerative changes of the thoracic and lumbar spine.    No focal hepatic lesion. Gallbladder and biliary tree are unremarkable. Spleen appears normal. Pancreas is unremarkable. No adrenal lesion. Kidneys appear normal. Ureters are normal in caliber. Urinary bladder is thick-walled incompletely distended.    Stomach is unremarkable. No evidence of small bowel obstruction. Normal appendix. No findings of colitis.    No free fluid or free air within the abdomen or pelvis. Aortoiliac atherosclerotic calcification. Small fat-containing umbilical hernia.    IMPRESSION:    Mild urinary bladder wall thickening, nonspecific and potentially due to incomplete distention. Please correlate with urinalysis to rule out cystitis.    Otherwise no acute pathology involving the abdomen or pelvis.    Incidental findings as above including atherosclerosis.    Electronically signed by:  Donnie Zendejas MD  9/21/2022 6:44 PM CDT Workstation: [id number]                                     X-Ray Chest PA And Lateral (Final result)  Result time 09/21/22 17:12:52      Final result by Donnie Zendejas MD (09/21/22 17:12:52)                   Narrative:    XR CHEST 2 VIEWS    CLINICAL HISTORY:  62 years Male pain with inspiration    COMPARISON: March 11, 2020    FINDINGS: Cardiomediastinal silhouette is within normal limits. Lungs are normally expanded with no airspace consolidation. No pleural effusion or pneumothorax. No acute osseous abnormality.    IMPRESSION: No acute pulmonary  process.    Electronically signed by:  Donnie Zendejas MD  9/21/2022 5:12 PM CDT Workstation: [id number]                                     Medications   iohexoL (OMNIPAQUE 350) injection 100 mL (100 mLs Intravenous Given 9/21/22 1806)     Medical Decision Making:   Initial Assessment:   62-year-old well-appearing male presents emergency department with complaint of pain that starts from the mid abdomen wraps around to his left flank area patient reports that he has associated nausea at times denies vomiting or diarrhea denies any urinary symptoms.  Patient is unsure of what makes symptoms better or worse he states sometimes the pain is worse when he bends over sometimes not he reports that the pain has been frequent in the morning time however dissipates around 9:00 p.m. he states he came to the emergency department today because it is been more frequent    Differential Diagnosis:   Considerations include UTI, pyelonephritis, PE, pancreatitis, pneumonia, cholelithiasis  ED Management:  8-year-old male presents emergency department with complaint of pain to the mid abdominal area just below the umbilicus that radiates to his left flank the patient reports that he is a  for [employer] he reports that the pain is worse in the morning, of when he attempts to get up, or moves his torso which in just hates morbid musculoskeletal pain patient reported that he him to emergency department because his pain was persistent today unlike previous days, denies any chest pain or shortness of breath reports that pain sometimes is reproducible with breathing has no risk factors for PE and D-dimer is negative PE is less likely.  Patient also takes Trulicity and dose was recently increased 3 weeks ago when all of a symptoms started lipase is normal CT reveals normal pancreas however patient does report increased GI symptoms since racing Trulicity does..  Patient's labs are unremarkable as well CT imaging because pain is  reproducible at times the patient has a strenuous job I will give him a few days of Robaxin                        Clinical Impression:   Final diagnoses:  [R14.0] Abdominal distension  [R10.30] Lower abdominal pain (Primary)  [R10.9] Left flank pain        ED Disposition Condition    Discharge Stable          ED Prescriptions       Medication Sig Dispense Start Date End Date Auth. Provider    methocarbamoL (ROBAXIN) 750 MG Tab Take 1 tablet (750 mg total) by mouth 3 (three) times daily. for 5 days 15 tablet 9/21/2022 9/26/2022 MARCOS Beltrán          Follow-up Information       Follow up With Specialties Details Why Contact Info    [facility] Schedule an appointment as soon as possible for a visit   [address]  [address]  [place] [id number]  [phone]               MARCOS Beltrán  09/21/22 1958

## 2022-09-22 NOTE — DISCHARGE INSTRUCTIONS
Robaxin as directed if needed for muscle spasm   Please follow-up with your primary care provider   Return if condition becomes worse for any concerns he developed chest pain, shortness of breath, fever,

## 2022-10-27 ENCOUNTER — OFFICE VISIT (OUTPATIENT)
Dept: FAMILY MEDICINE | Facility: CLINIC | Age: 62
End: 2022-10-27
Payer: MEDICAID

## 2022-10-27 VITALS
HEIGHT: 72 IN | BODY MASS INDEX: 29.87 KG/M2 | SYSTOLIC BLOOD PRESSURE: 132 MMHG | OXYGEN SATURATION: 96 % | WEIGHT: 220.5 LBS | DIASTOLIC BLOOD PRESSURE: 80 MMHG | TEMPERATURE: 98 F | RESPIRATION RATE: 20 BRPM | HEART RATE: 102 BPM

## 2022-10-27 DIAGNOSIS — I10 ESSENTIAL HYPERTENSION: ICD-10-CM

## 2022-10-27 DIAGNOSIS — E11.65 TYPE 2 DIABETES MELLITUS WITH HYPERGLYCEMIA, WITHOUT LONG-TERM CURRENT USE OF INSULIN: Primary | ICD-10-CM

## 2022-10-27 DIAGNOSIS — M54.50 ACUTE LEFT-SIDED LOW BACK PAIN WITHOUT SCIATICA: ICD-10-CM

## 2022-10-27 LAB — HBA1C MFR BLD: 6.3 %

## 2022-10-27 PROCEDURE — 3044F PR MOST RECENT HEMOGLOBIN A1C LEVEL <7.0%: ICD-10-PCS | Mod: CPTII,,, | Performed by: NURSE PRACTITIONER

## 2022-10-27 PROCEDURE — 3075F SYST BP GE 130 - 139MM HG: CPT | Mod: CPTII,,, | Performed by: NURSE PRACTITIONER

## 2022-10-27 PROCEDURE — 3079F DIAST BP 80-89 MM HG: CPT | Mod: CPTII,,, | Performed by: NURSE PRACTITIONER

## 2022-10-27 PROCEDURE — 99214 PR OFFICE/OUTPT VISIT, EST, LEVL IV, 30-39 MIN: ICD-10-PCS | Mod: S$PBB,,, | Performed by: NURSE PRACTITIONER

## 2022-10-27 PROCEDURE — 3044F HG A1C LEVEL LT 7.0%: CPT | Mod: CPTII,,, | Performed by: NURSE PRACTITIONER

## 2022-10-27 PROCEDURE — 83036 HEMOGLOBIN GLYCOSYLATED A1C: CPT | Mod: PBBFAC | Performed by: NURSE PRACTITIONER

## 2022-10-27 PROCEDURE — 3066F PR DOCUMENTATION OF TREATMENT FOR NEPHROPATHY: ICD-10-PCS | Mod: CPTII,,, | Performed by: NURSE PRACTITIONER

## 2022-10-27 PROCEDURE — 3061F NEG MICROALBUMINURIA REV: CPT | Mod: CPTII,,, | Performed by: NURSE PRACTITIONER

## 2022-10-27 PROCEDURE — 3079F PR MOST RECENT DIASTOLIC BLOOD PRESSURE 80-89 MM HG: ICD-10-PCS | Mod: CPTII,,, | Performed by: NURSE PRACTITIONER

## 2022-10-27 PROCEDURE — 99215 OFFICE O/P EST HI 40 MIN: CPT | Performed by: NURSE PRACTITIONER

## 2022-10-27 PROCEDURE — 1159F PR MEDICATION LIST DOCUMENTED IN MEDICAL RECORD: ICD-10-PCS | Mod: CPTII,,, | Performed by: NURSE PRACTITIONER

## 2022-10-27 PROCEDURE — 4010F ACE/ARB THERAPY RXD/TAKEN: CPT | Mod: CPTII,,, | Performed by: NURSE PRACTITIONER

## 2022-10-27 PROCEDURE — 1159F MED LIST DOCD IN RCRD: CPT | Mod: CPTII,,, | Performed by: NURSE PRACTITIONER

## 2022-10-27 PROCEDURE — 4010F PR ACE/ARB THEARPY RXD/TAKEN: ICD-10-PCS | Mod: CPTII,,, | Performed by: NURSE PRACTITIONER

## 2022-10-27 PROCEDURE — 3066F NEPHROPATHY DOC TX: CPT | Mod: CPTII,,, | Performed by: NURSE PRACTITIONER

## 2022-10-27 PROCEDURE — 99214 OFFICE O/P EST MOD 30 MIN: CPT | Mod: S$PBB,,, | Performed by: NURSE PRACTITIONER

## 2022-10-27 PROCEDURE — 3075F PR MOST RECENT SYSTOLIC BLOOD PRESS GE 130-139MM HG: ICD-10-PCS | Mod: CPTII,,, | Performed by: NURSE PRACTITIONER

## 2022-10-27 PROCEDURE — 3061F PR NEG MICROALBUMINURIA RESULT DOCUMENTED/REVIEW: ICD-10-PCS | Mod: CPTII,,, | Performed by: NURSE PRACTITIONER

## 2022-10-28 ENCOUNTER — HOSPITAL ENCOUNTER (OUTPATIENT)
Dept: RADIOLOGY | Facility: HOSPITAL | Age: 62
Discharge: HOME | End: 2022-10-28
Attending: NURSE PRACTITIONER
Payer: MEDICAID

## 2022-10-28 DIAGNOSIS — M54.50 ACUTE LEFT-SIDED LOW BACK PAIN WITHOUT SCIATICA: ICD-10-CM

## 2022-10-28 PROCEDURE — 72114 X-RAY EXAM L-S SPINE BENDING: CPT | Mod: TC,PO

## 2022-10-28 NOTE — PROGRESS NOTES
SUBJECTIVE:      Patient ID: Gagan Escobar is a 62 y.o. male.    Chief Complaint: Diabetes    Established patient here for follow-up on diabetes. A1C in office down to 6.3!  Pharmacy has not been filling meds on time- considering changing pharmacies. He is taking all of his medications as prescribed and has been trying to eat better since his last visit.     C/o acute lower back pain since a fall in 9/22. He was on a ladder hanging drywall at work- fell onto his left side. C/o tightness, deep pain since especially in his left lower back. Seen in ER and had CT abd/pelvis but no back imaging. Neg for kidney stones workup. Tried chiropractor and helped minimally after first visit.     Diabetes  He presents for his follow-up diabetic visit. He has type 2 diabetes mellitus. His disease course has been improving. There are no hypoglycemic associated symptoms. Pertinent negatives for hypoglycemia include no dizziness, headaches, nervousness/anxiousness or pallor. Pertinent negatives for diabetes include no blurred vision, no chest pain, no fatigue, no foot paresthesias, no foot ulcerations, no polydipsia, no polyphagia, no polyuria, no visual change, no weakness and no weight loss. There are no hypoglycemic complications. Symptoms are improving. Diabetic complications include impotence and peripheral neuropathy. Risk factors for coronary artery disease include diabetes mellitus, dyslipidemia, family history, male sex, hypertension, obesity and sedentary lifestyle. Current diabetic treatment includes oral agent (monotherapy), diet and insulin injections (Trulicity). He is compliant with treatment all of the time. His weight is decreasing steadily. He is following a generally healthy diet. Meal planning includes avoidance of concentrated sweets. He has not had a previous visit with a dietitian. He rarely participates in exercise. His home blood glucose trend is decreasing steadily. (120-150) An ACE  inhibitor/angiotensin II receptor blocker is being taken. He does not see a podiatrist.Eye exam is not current (needs to schedule ).   Back Pain  This is a new problem. The current episode started more than 1 month ago. The problem occurs daily. The problem has been gradually improving since onset. The pain is present in the lumbar spine. The quality of the pain is described as aching and cramping. The pain does not radiate. The pain is moderate. The pain is Worse during the night. The symptoms are aggravated by bending, twisting and standing. Stiffness is present In the morning. Pertinent negatives include no abdominal pain, bladder incontinence, bowel incontinence, chest pain, dysuria, fever, headaches, leg pain, numbness, paresthesias, pelvic pain, perianal numbness, tingling, weakness or weight loss. Risk factors include recent trauma. He has tried NSAIDs, bed rest, heat and ice for the symptoms. The treatment provided moderate relief.     Family History   Problem Relation Age of Onset    Heart disease Mother     Mental illness Mother     Asthma Father       Social History     Socioeconomic History    Marital status:    Tobacco Use    Smoking status: Never    Smokeless tobacco: Current   Substance and Sexual Activity    Alcohol use: Yes    Drug use: No    Sexual activity: Yes     Current Outpatient Medications   Medication Sig Dispense Refill    aspirin (ECOTRIN) 81 MG EC tablet Take 1 tablet (81 mg total) by mouth once daily. 90 tablet 1    atorvastatin (LIPITOR) 20 MG tablet Take 1 tablet (20 mg total) by mouth every evening. 90 tablet 1    dulaglutide (TRULICITY) 1.5 mg/0.5 mL pen injector Inject 1.5 mg into the skin every 7 days. 4 pen 5    LANTUS SOLOSTAR U-100 INSULIN glargine 100 units/mL (3mL) SubQ pen ADMINISTER 30 UNITS UNDER THE SKIN EVERY EVENING 15 mL 5    metFORMIN (GLUCOPHAGE-XR) 500 MG ER 24hr tablet TAKE 2 TABLETS(1000 MG) BY MOUTH DAILY WITH BREAKFAST 180 tablet 1    valsartan (DIOVAN)  "160 MG tablet TAKE 1 TABLET BY MOUTH EVERY DAY 90 tablet 1    lancets Misc To check BG 2 times daily, to use with insurance preferred meter 100 each 5    pen needle, diabetic (BD ULTRA-FINE SHORT PEN NEEDLE) 31 gauge x 5/16" Ndle USE 1 PEN NEEDLE DAILY TO ADMINISTER INSULIN 100 each 5     No current facility-administered medications for this visit.     Review of patient's allergies indicates:   Allergen Reactions    Ace inhibitors Rash    Pcn [penicillins]       Past Medical History:   Diagnosis Date    Diabetes mellitus type 2 with complications     Hyperlipidemia     Hypertension      Past Surgical History:   Procedure Laterality Date    FOOT SURGERY      NECK SURGERY  02/2020       Review of Systems   Constitutional:  Negative for appetite change, chills, fatigue, fever, unexpected weight change and weight loss.   HENT:  Negative for congestion and sore throat.    Eyes:  Negative for blurred vision, pain and visual disturbance.   Respiratory:  Negative for cough and shortness of breath.    Cardiovascular:  Negative for chest pain, palpitations and leg swelling.   Gastrointestinal:  Negative for abdominal pain, blood in stool, bowel incontinence, constipation, diarrhea, nausea and vomiting.   Endocrine: Negative for cold intolerance, heat intolerance, polydipsia, polyphagia and polyuria.   Genitourinary:  Positive for impotence. Negative for bladder incontinence, dysuria, frequency, hematuria, pelvic pain and urgency.   Musculoskeletal:  Positive for back pain. Negative for arthralgias, gait problem, myalgias and neck pain.   Skin:  Negative for color change, pallor, rash and wound.   Allergic/Immunologic: Positive for environmental allergies.   Neurological:  Negative for dizziness, tingling, weakness, numbness, headaches and paresthesias.   Hematological:  Negative for adenopathy. Does not bruise/bleed easily.   Psychiatric/Behavioral:  Negative for dysphoric mood, sleep disturbance and suicidal ideas. The " patient is not nervous/anxious.     OBJECTIVE:      Vitals:    10/27/22 1519 10/27/22 1523   BP: (!) 140/80 132/80   BP Location: Right arm Right arm   Patient Position: Sitting Sitting   BP Method: Medium (Manual) Medium (Manual)   Pulse: 102    Resp: 20    Temp: 98.2 °F (36.8 °C)    TempSrc: Oral    SpO2: 96%    Weight: 100 kg (220 lb 8 oz)    Height: 6' (1.829 m)      Physical Exam  Vitals and nursing note reviewed. Exam conducted with a chaperone present.   Constitutional:       General: He is not in acute distress.     Appearance: Normal appearance. He is well-developed. He is obese. He is not ill-appearing.      Comments: Overweight    HENT:      Head: Normocephalic.      Mouth/Throat:      Mouth: Mucous membranes are moist.   Eyes:      General: No scleral icterus.     Pupils: Pupils are equal, round, and reactive to light.   Neck:      Thyroid: No thyroid mass or thyromegaly.      Trachea: Trachea normal.   Cardiovascular:      Rate and Rhythm: Normal rate and regular rhythm.      Heart sounds: Normal heart sounds. No murmur heard.  Pulmonary:      Effort: Pulmonary effort is normal. No respiratory distress.      Breath sounds: Normal breath sounds. No wheezing or rales.   Abdominal:      General: Bowel sounds are normal.      Palpations: Abdomen is soft.      Tenderness: There is no abdominal tenderness.   Musculoskeletal:         General: Normal range of motion.      Cervical back: Normal range of motion and neck supple.      Lumbar back: Spasms and tenderness present. No swelling, edema, deformity, signs of trauma, lacerations or bony tenderness. Negative right straight leg raise test and negative left straight leg raise test.        Back:       Right lower leg: No edema.      Left lower leg: No edema.   Lymphadenopathy:      Cervical: No cervical adenopathy.   Skin:     General: Skin is warm and dry.      Coloration: Skin is not jaundiced or pale.      Findings: No bruising.   Neurological:      Mental  Status: He is alert and oriented to person, place, and time.      Sensory: No sensory deficit.      Motor: No weakness.      Coordination: Coordination normal.      Gait: Gait normal.   Psychiatric:         Mood and Affect: Mood normal.         Behavior: Behavior normal.         Thought Content: Thought content normal.         Judgment: Judgment normal.      Assessment:       1. Type 2 diabetes mellitus with hyperglycemia, without long-term current use of insulin    2. Essential hypertension    3. Acute left-sided low back pain without sciatica          Plan:       Type 2 diabetes mellitus with hyperglycemia, without long-term current use of insulin  -     Hemoglobin A1C, POCT  (6.3)    Essential hypertension   -stable    Acute left-sided low back pain without sciatica  -     X-Ray Lumbar Complete Including Flex And Ext; Future; Expected date: 10/27/2022   -get xrays and discussed PT option; pt would like to cont meds prn and home remedies; will contact if imaging abnormal or if PT is desired he should call me back     Follow up in about 6 months (around 4/27/2023) for f/u DM, annual .      10/28/2022 PRIMO Zuniga, FNP    This note was created using [employer] voice recognition software that occasionally misinterprets phrases or words.

## 2022-10-28 NOTE — PROGRESS NOTES
Xrays show no fractures- lot of degenerative changes and arthritis in the spine; continue medications and we can order PT referral of not improving

## 2022-10-30 ENCOUNTER — OFFICE VISIT (OUTPATIENT)
Dept: URGENT CARE | Facility: CLINIC | Age: 62
End: 2022-10-30
Payer: MEDICAID

## 2022-10-30 VITALS
TEMPERATURE: 98 F | HEART RATE: 93 BPM | BODY MASS INDEX: 30.42 KG/M2 | OXYGEN SATURATION: 96 % | DIASTOLIC BLOOD PRESSURE: 94 MMHG | SYSTOLIC BLOOD PRESSURE: 155 MMHG | RESPIRATION RATE: 18 BRPM | HEIGHT: 72 IN | WEIGHT: 224.63 LBS

## 2022-10-30 DIAGNOSIS — R51.9 ACUTE INTRACTABLE HEADACHE, UNSPECIFIED HEADACHE TYPE: Primary | ICD-10-CM

## 2022-10-30 DIAGNOSIS — R09.89 SINUS COMPLAINT: ICD-10-CM

## 2022-10-30 DIAGNOSIS — H92.09 OTALGIA, UNSPECIFIED LATERALITY: ICD-10-CM

## 2022-10-30 LAB
CTP QC/QA: YES
CTP QC/QA: YES
FLUAV AG NPH QL: NEGATIVE
FLUBV AG NPH QL: NEGATIVE
SARS-COV-2 AG RESP QL IA.RAPID: NEGATIVE

## 2022-10-30 PROCEDURE — 1159F PR MEDICATION LIST DOCUMENTED IN MEDICAL RECORD: ICD-10-PCS | Mod: CPTII,S$GLB,, | Performed by: NURSE PRACTITIONER

## 2022-10-30 PROCEDURE — 3077F SYST BP >= 140 MM HG: CPT | Mod: CPTII,S$GLB,, | Performed by: NURSE PRACTITIONER

## 2022-10-30 PROCEDURE — 3044F PR MOST RECENT HEMOGLOBIN A1C LEVEL <7.0%: ICD-10-PCS | Mod: CPTII,S$GLB,, | Performed by: NURSE PRACTITIONER

## 2022-10-30 PROCEDURE — 4010F PR ACE/ARB THEARPY RXD/TAKEN: ICD-10-PCS | Mod: CPTII,S$GLB,, | Performed by: NURSE PRACTITIONER

## 2022-10-30 PROCEDURE — 3066F PR DOCUMENTATION OF TREATMENT FOR NEPHROPATHY: ICD-10-PCS | Mod: CPTII,S$GLB,, | Performed by: NURSE PRACTITIONER

## 2022-10-30 PROCEDURE — 1160F PR REVIEW ALL MEDS BY PRESCRIBER/CLIN PHARMACIST DOCUMENTED: ICD-10-PCS | Mod: CPTII,S$GLB,, | Performed by: NURSE PRACTITIONER

## 2022-10-30 PROCEDURE — 3066F NEPHROPATHY DOC TX: CPT | Mod: CPTII,S$GLB,, | Performed by: NURSE PRACTITIONER

## 2022-10-30 PROCEDURE — 87804 INFLUENZA ASSAY W/OPTIC: CPT | Mod: QW,,, | Performed by: NURSE PRACTITIONER

## 2022-10-30 PROCEDURE — 3080F PR MOST RECENT DIASTOLIC BLOOD PRESSURE >= 90 MM HG: ICD-10-PCS | Mod: CPTII,S$GLB,, | Performed by: NURSE PRACTITIONER

## 2022-10-30 PROCEDURE — 99214 PR OFFICE/OUTPT VISIT, EST, LEVL IV, 30-39 MIN: ICD-10-PCS | Mod: S$GLB,,, | Performed by: NURSE PRACTITIONER

## 2022-10-30 PROCEDURE — 99214 OFFICE O/P EST MOD 30 MIN: CPT | Mod: S$GLB,,, | Performed by: NURSE PRACTITIONER

## 2022-10-30 PROCEDURE — 3077F PR MOST RECENT SYSTOLIC BLOOD PRESSURE >= 140 MM HG: ICD-10-PCS | Mod: CPTII,S$GLB,, | Performed by: NURSE PRACTITIONER

## 2022-10-30 PROCEDURE — 3044F HG A1C LEVEL LT 7.0%: CPT | Mod: CPTII,S$GLB,, | Performed by: NURSE PRACTITIONER

## 2022-10-30 PROCEDURE — 4010F ACE/ARB THERAPY RXD/TAKEN: CPT | Mod: CPTII,S$GLB,, | Performed by: NURSE PRACTITIONER

## 2022-10-30 PROCEDURE — 3080F DIAST BP >= 90 MM HG: CPT | Mod: CPTII,S$GLB,, | Performed by: NURSE PRACTITIONER

## 2022-10-30 PROCEDURE — 87804 POCT INFLUENZA A/B: ICD-10-PCS | Mod: 59,QW,, | Performed by: NURSE PRACTITIONER

## 2022-10-30 PROCEDURE — 1160F RVW MEDS BY RX/DR IN RCRD: CPT | Mod: CPTII,S$GLB,, | Performed by: NURSE PRACTITIONER

## 2022-10-30 PROCEDURE — 1159F MED LIST DOCD IN RCRD: CPT | Mod: CPTII,S$GLB,, | Performed by: NURSE PRACTITIONER

## 2022-10-30 PROCEDURE — 3061F PR NEG MICROALBUMINURIA RESULT DOCUMENTED/REVIEW: ICD-10-PCS | Mod: CPTII,S$GLB,, | Performed by: NURSE PRACTITIONER

## 2022-10-30 PROCEDURE — 87811 SARS-COV-2 COVID19 W/OPTIC: CPT | Mod: QW,S$GLB,, | Performed by: NURSE PRACTITIONER

## 2022-10-30 PROCEDURE — 87811 SARS CORONAVIRUS 2 ANTIGEN POCT, MANUAL READ: ICD-10-PCS | Mod: QW,S$GLB,, | Performed by: NURSE PRACTITIONER

## 2022-10-30 PROCEDURE — 3061F NEG MICROALBUMINURIA REV: CPT | Mod: CPTII,S$GLB,, | Performed by: NURSE PRACTITIONER

## 2022-10-30 RX ORDER — DEXAMETHASONE SODIUM PHOSPHATE 4 MG/ML
4 INJECTION, SOLUTION INTRA-ARTICULAR; INTRALESIONAL; INTRAMUSCULAR; INTRAVENOUS; SOFT TISSUE
Status: COMPLETED | OUTPATIENT
Start: 2022-10-30 | End: 2022-10-30

## 2022-10-30 RX ADMIN — DEXAMETHASONE SODIUM PHOSPHATE 4 MG: 4 INJECTION, SOLUTION INTRA-ARTICULAR; INTRALESIONAL; INTRAMUSCULAR; INTRAVENOUS; SOFT TISSUE at 11:10

## 2022-10-30 NOTE — PROGRESS NOTES
Subjective:       Patient ID: Gagan Escobar is a 62 y.o. male.    Vitals:  height is 6' (1.829 m) and weight is 101.9 kg (224 lb 9.6 oz). His oral temperature is 98 °F (36.7 °C). His blood pressure is 155/94 (abnormal) and his pulse is 93. His respiration is 18 and oxygen saturation is 96%.     Chief Complaint: Otalgia and Headache    Pt states that his symptoms started on 3 days ago . Patient states that his symptoms are the following:  headache radiates all over the right side of face, patient states that he thinks he may have either an ear infection or a nasal infection, pt states that he has no other symptoms. Patient states that he hasnt taken anything for symptoms.   Patient denies any other symptoms.     Headache   This is a new problem. The current episode started in the past 7 days. The problem occurs daily. The problem has been unchanged. The pain is located in the Frontal and temporal region. The pain radiates to the face. The quality of the pain is described as aching. The pain is at a severity of 8/10. The pain is moderate. Pertinent negatives include no abdominal pain, coughing or fever. He has tried nothing for the symptoms. The treatment provided no relief.     Constitution: Negative for fever.   HENT:  Positive for postnasal drip.    Cardiovascular:  Negative for chest pain.   Respiratory:  Negative for cough and shortness of breath.    Gastrointestinal:  Negative for abdominal pain.   Skin:  Negative for rash.   Neurological:  Positive for headaches.     Objective:      Physical Exam   Constitutional: He is oriented to person, place, and time.   HENT:   Head: Normocephalic.   Ears:   Right Ear: Tympanic membrane and ear canal normal.   Left Ear: Tympanic membrane and ear canal normal.   Mouth/Throat: Posterior oropharyngeal erythema present. No oropharyngeal exudate.   Eyes: Conjunctivae are normal.   Cardiovascular: Normal rate and regular rhythm.   Pulmonary/Chest: Effort normal and breath  sounds normal.   Abdominal: Normal appearance.   Musculoskeletal: Normal range of motion.         General: Normal range of motion.   Neurological: no focal deficit. He is alert and oriented to person, place, and time.   Skin: Skin is no rash.   Psychiatric: His behavior is normal. Mood, judgment and thought content normal.   Nursing note and vitals reviewed.      Assessment:       1. Acute intractable headache, unspecified headache type    2. Sinus complaint    3. Otalgia, unspecified laterality          Plan:         Acute intractable headache, unspecified headache type  -     POCT Influenza A/B  -     SARS Coronavirus 2 Antigen, POCT Manual Read    Sinus complaint  -     POCT Influenza A/B    Otalgia, unspecified laterality  -     POCT Influenza A/B       Mr Escobar presents with complaint of R sided sinus pressure/pain radiating into ear and dentition. No evidence of OM or dental infection, further no evidence of  bacterial pharyngitis. Covid and Flu (-)/ Provided IM steroid as he states his diabetes is well controlled and advised on regular anti-histamine and PCP FU if symptoms continue. As symptoms only 2 days did not feel antibiotics indicated.

## 2022-11-03 ENCOUNTER — TELEPHONE (OUTPATIENT)
Dept: FAMILY MEDICINE | Facility: CLINIC | Age: 62
End: 2022-11-03

## 2022-11-03 NOTE — TELEPHONE ENCOUNTER
Called patient to give x-ray results. Left message on his voicemail with return number for question.

## 2022-11-03 NOTE — TELEPHONE ENCOUNTER
----- Message from [facility] sent at 10/28/2022 10:57 AM CDT -----  Xrays show no fractures- lot of degenerative changes and arthritis in the spine; continue medications and we can order PT referral of not improving

## 2022-11-04 ENCOUNTER — TELEPHONE (OUTPATIENT)
Dept: FAMILY MEDICINE | Facility: CLINIC | Age: 62
End: 2022-11-04

## 2022-11-04 NOTE — TELEPHONE ENCOUNTER
Patient called about results stating to call and if he did not answer leave message on his voice mail.  Left the information on his voice mail with return number.

## 2023-01-10 DIAGNOSIS — I10 ESSENTIAL HYPERTENSION: ICD-10-CM

## 2023-01-10 DIAGNOSIS — E11.65 TYPE 2 DIABETES MELLITUS WITH HYPERGLYCEMIA, WITHOUT LONG-TERM CURRENT USE OF INSULIN: ICD-10-CM

## 2023-01-11 RX ORDER — VALSARTAN 160 MG/1
TABLET ORAL
Qty: 90 TABLET | Refills: 1 | Status: SHIPPED | OUTPATIENT
Start: 2023-01-11 | End: 2023-05-19 | Stop reason: CLARIF

## 2023-01-11 RX ORDER — METFORMIN HYDROCHLORIDE 500 MG/1
TABLET, EXTENDED RELEASE ORAL
Qty: 180 TABLET | Refills: 1 | Status: SHIPPED | OUTPATIENT
Start: 2023-01-11

## 2023-02-01 ENCOUNTER — TELEPHONE (OUTPATIENT)
Dept: FAMILY MEDICINE | Facility: CLINIC | Age: 63
End: 2023-02-01

## 2023-02-01 DIAGNOSIS — M47.816 FACET ARTHRITIS OF LUMBAR REGION: ICD-10-CM

## 2023-02-01 DIAGNOSIS — M51.36 DEGENERATIVE DISC DISEASE, LUMBAR: Primary | ICD-10-CM

## 2023-02-01 DIAGNOSIS — M54.50 ACUTE LEFT-SIDED LOW BACK PAIN WITHOUT SCIATICA: ICD-10-CM

## 2023-02-01 NOTE — TELEPHONE ENCOUNTER
Patient's SO called stating that the patient needed a referral to back doctor. She stated he was hurting so bad he could hardly move because it hurt so bad.   Please advise.

## 2023-02-03 ENCOUNTER — TELEPHONE (OUTPATIENT)
Dept: FAMILY MEDICINE | Facility: CLINIC | Age: 63
End: 2023-02-03

## 2023-03-28 ENCOUNTER — TELEPHONE (OUTPATIENT)
Dept: FAMILY MEDICINE | Facility: CLINIC | Age: 63
End: 2023-03-28

## 2023-03-28 NOTE — TELEPHONE ENCOUNTER
Not as of date. The patient received the letter stating they were no longer going to cover the medication. He stated that they used to give him 2 boxes then it went to one, now they are not wanting to cover the medication.

## 2023-03-28 NOTE — TELEPHONE ENCOUNTER
Did we receive any paperwork about this denial? Or they don't have the medication at the pharmacy?

## 2023-03-29 ENCOUNTER — TELEPHONE (OUTPATIENT)
Dept: FAMILY MEDICINE | Facility: CLINIC | Age: 63
End: 2023-03-29

## 2023-03-29 NOTE — TELEPHONE ENCOUNTER
Patient called [facility] about the Trulicity and they will pay for 28 days at a time. It is [facility] trying to push 56 day fill. Medicaid will only cover 28 days at a time.

## 2023-03-29 NOTE — TELEPHONE ENCOUNTER
Spoke with the patient he stated that he was going to try and call the insurance company to get some clarification. Once he gets some information is will call us back.

## 2023-04-11 ENCOUNTER — OFFICE VISIT (OUTPATIENT)
Dept: ORTHOPEDICS | Facility: CLINIC | Age: 63
End: 2023-04-11
Payer: MEDICAID

## 2023-04-11 VITALS
HEIGHT: 72 IN | DIASTOLIC BLOOD PRESSURE: 84 MMHG | SYSTOLIC BLOOD PRESSURE: 134 MMHG | WEIGHT: 224 LBS | BODY MASS INDEX: 30.34 KG/M2

## 2023-04-11 DIAGNOSIS — M51.36 DISC DEGENERATION, LUMBAR: Primary | ICD-10-CM

## 2023-04-11 DIAGNOSIS — M47.816 LUMBAR FACET ARTHROPATHY: ICD-10-CM

## 2023-04-11 DIAGNOSIS — M47.816 LUMBAR SPONDYLOSIS: ICD-10-CM

## 2023-04-11 PROCEDURE — 1160F PR REVIEW ALL MEDS BY PRESCRIBER/CLIN PHARMACIST DOCUMENTED: ICD-10-PCS | Mod: CPTII,S$GLB,, | Performed by: PHYSICIAN ASSISTANT

## 2023-04-11 PROCEDURE — 3075F SYST BP GE 130 - 139MM HG: CPT | Mod: CPTII,S$GLB,, | Performed by: PHYSICIAN ASSISTANT

## 2023-04-11 PROCEDURE — 99203 OFFICE O/P NEW LOW 30 MIN: CPT | Mod: S$GLB,,, | Performed by: PHYSICIAN ASSISTANT

## 2023-04-11 PROCEDURE — 1160F RVW MEDS BY RX/DR IN RCRD: CPT | Mod: CPTII,S$GLB,, | Performed by: PHYSICIAN ASSISTANT

## 2023-04-11 PROCEDURE — 4010F ACE/ARB THERAPY RXD/TAKEN: CPT | Mod: CPTII,S$GLB,, | Performed by: PHYSICIAN ASSISTANT

## 2023-04-11 PROCEDURE — 4010F PR ACE/ARB THEARPY RXD/TAKEN: ICD-10-PCS | Mod: CPTII,S$GLB,, | Performed by: PHYSICIAN ASSISTANT

## 2023-04-11 PROCEDURE — 1159F MED LIST DOCD IN RCRD: CPT | Mod: CPTII,S$GLB,, | Performed by: PHYSICIAN ASSISTANT

## 2023-04-11 PROCEDURE — 3008F PR BODY MASS INDEX (BMI) DOCUMENTED: ICD-10-PCS | Mod: CPTII,S$GLB,, | Performed by: PHYSICIAN ASSISTANT

## 2023-04-11 PROCEDURE — 99203 PR OFFICE/OUTPT VISIT, NEW, LEVL III, 30-44 MIN: ICD-10-PCS | Mod: S$GLB,,, | Performed by: PHYSICIAN ASSISTANT

## 2023-04-11 PROCEDURE — 3079F PR MOST RECENT DIASTOLIC BLOOD PRESSURE 80-89 MM HG: ICD-10-PCS | Mod: CPTII,S$GLB,, | Performed by: PHYSICIAN ASSISTANT

## 2023-04-11 PROCEDURE — 1159F PR MEDICATION LIST DOCUMENTED IN MEDICAL RECORD: ICD-10-PCS | Mod: CPTII,S$GLB,, | Performed by: PHYSICIAN ASSISTANT

## 2023-04-11 PROCEDURE — 3079F DIAST BP 80-89 MM HG: CPT | Mod: CPTII,S$GLB,, | Performed by: PHYSICIAN ASSISTANT

## 2023-04-11 PROCEDURE — 3008F BODY MASS INDEX DOCD: CPT | Mod: CPTII,S$GLB,, | Performed by: PHYSICIAN ASSISTANT

## 2023-04-11 PROCEDURE — 3075F PR MOST RECENT SYSTOLIC BLOOD PRESS GE 130-139MM HG: ICD-10-PCS | Mod: CPTII,S$GLB,, | Performed by: PHYSICIAN ASSISTANT

## 2023-04-11 RX ORDER — MELOXICAM 15 MG/1
15 TABLET ORAL DAILY
Qty: 30 TABLET | Refills: 2 | Status: SHIPPED | OUTPATIENT
Start: 2023-04-11 | End: 2023-07-10

## 2023-04-11 NOTE — PROGRESS NOTES
"  [facility]  [address] [facility] [address] [place] [address]  Phone: [phone]   [phone]    Patient's PCP: MARCOS Zuniga  Referring Provider: Meri Galeano    Subjective:      Chief Complaint:   Chief Complaint   Patient presents with    Spine - Pain     Lumbar pain for about 6 months or so, denies leg pain. Has pain across lumbar, has some catching as he describes it. Has been to chiropractor.        Past Medical History:   Diagnosis Date    Diabetes mellitus type 2 with complications     Hyperlipidemia     Hypertension        Past Surgical History:   Procedure Laterality Date    FOOT SURGERY      NECK SURGERY  02/2020       Current Outpatient Medications   Medication Sig    aspirin (ECOTRIN) 81 MG EC tablet Take 1 tablet (81 mg total) by mouth once daily.    dulaglutide (TRULICITY) 1.5 mg/0.5 mL pen injector Inject 1.5 mg into the skin every 7 days.    lancets Misc To check BG 2 times daily, to use with insurance preferred meter    LANTUS SOLOSTAR U-100 INSULIN glargine 100 units/mL (3mL) SubQ pen ADMINISTER 30 UNITS UNDER THE SKIN EVERY EVENING    metFORMIN (GLUCOPHAGE-XR) 500 MG ER 24hr tablet TAKE 2 TABLETS(1000 MG) BY MOUTH DAILY WITH BREAKFAST    pen needle, diabetic (BD ULTRA-FINE SHORT PEN NEEDLE) 31 gauge x 5/16" Ndle USE 1 PEN NEEDLE DAILY TO ADMINISTER INSULIN    valsartan (DIOVAN) 160 MG tablet TAKE 1 TABLET BY MOUTH EVERY DAY    atorvastatin (LIPITOR) 20 MG tablet Take 1 tablet (20 mg total) by mouth every evening. (Patient not taking: Reported on 10/30/2022)    meloxicam (MOBIC) 15 MG tablet Take 1 tablet (15 mg total) by mouth once daily.     No current facility-administered medications for this visit.       Review of patient's allergies indicates:   Allergen Reactions    Ace inhibitors Rash    Pcn [penicillins]        Family History   Problem Relation Age of Onset    Heart disease Mother     Mental illness Mother     Asthma Father        Social History "     Socioeconomic History    Marital status:    Tobacco Use    Smoking status: Never    Smokeless tobacco: Current   Substance and Sexual Activity    Alcohol use: Yes    Drug use: No    Sexual activity: Yes       Prior to meeting with the patient I reviewed the medical chart in [facility]. This included reviewing the previous progress notes from our office, review of the patient's last appointment with their primary care provider, review of any visits to the emergency room, and review of any pain management appointments or procedures.    History of present illness:  63-year-old male, presents to clinic today as a new patient for evaluation of complaints of chronic low back pain. Lower back pain since a fall in 9/22. He was on a ladder hanging drywall at work when he fell onto his left side.  Complains of tightness, deep pain since especially in his left lower back. Seen in ER and had CT abd/pelvis but no back imaging.  Plain x-rays ordered by primary care showed multilevel degenerative changes facet arthritis. Tried chiropractor and helped minimally after first visit.       Review of Systems:    Constitutional: Negative for chills, fever and weight loss.   HENT: Negative for congestion.    Eyes: Negative for discharge and redness.   Respiratory: Negative for cough and shortness of breath.    Cardiovascular: Negative for chest pain.   Gastrointestinal: Negative for nausea and vomiting.   Musculoskeletal: See HPI.   Skin: Negative for rash.   Neurological: Negative for headaches.   Endo/Heme/Allergies: Does not bruise/bleed easily.   Psychiatric/Behavioral: The patient is not nervous/anxious.    All other systems reviewed and are negative.       Objective:      Physical Examination:    Vital Signs:    Vitals:    04/11/23 1229   BP: 134/84       Body mass index is 30.38 kg/m².    This a well-developed, well nourished patient in no acute distress.  They are alert and oriented and cooperative to examination.      Examination of the lumbar spine, no midline vertebral tenderness, he does have paravertebral tenderness bilaterally slightly worse on the left than the right.  No significant restrictions in range of motion with full forward flexion extension rotation lateral bending.  Bilateral straight leg raises are negative.  Calf soft nontender, symmetrical muscle strength bilateral lower extremities.  Distally neurovascularly intact.      Pertinent New Results:      Narrative & Impression  HISTORY: Low back pain, fall in 9/22 onto left side, r/o fracture vs DDD     FINDINGS: 8 views of the lumbar spine with comparison to CT of 09/21/2022 shows normal lordotic curvature and vertebral body alignment, with normal vertebral heights, and no acute fractures or destructive osseous lesions. There is a transitional lumbosacral segment.     There is mild-to-moderate multilevel intervertebral disc space narrowing with osteophytes, with mild lower lumbar facet arthropathy. There is no evidence of spondylolysis. Flexion and extension views show no transient anterolisthesis.     The sacroiliac joints are is normal, with normal bone mineralization. There are scattered aortoiliac vascular calcifications.     IMPRESSION:  1. Negative for acute lumbar spine fracture.  2. Multilevel lumbar degenerative disc disease and facet osteoarthritis.     Electronically signed by:  Nacho Hunt MD  10/28/2022 10:13 AM CDT Workstation: [id number]           Specimen Collected: 10/28/22 08:37 Last Resulted: 10/28/22 10:13             XRAY Report / Interpretation:   AP pelvis, no acute changes or osseous abnormalities.  Visualized soft tissues appear normal.    AP and lateral views lumbar spine taken today in the office show multilevel degenerative changes.  There is multilevel osteophyte formation anteriorly, there are facet joint changes and multilevel disc degeneration.      Assessment:       1. Disc degeneration, lumbar    2. Lumbar facet arthropathy     3. Lumbar spondylosis      Plan:     Disc degeneration, lumbar  -     X-Ray Lumbar Spine Ap And Lateral  -     X-Ray Pelvis Routine AP  -     Ambulatory referral/consult to Physical/Occupational Therapy; Future; Expected date: 04/18/2023  -     meloxicam (MOBIC) 15 MG tablet; Take 1 tablet (15 mg total) by mouth once daily.  Dispense: 30 tablet; Refill: 2    Lumbar facet arthropathy  -     Ambulatory referral/consult to Physical/Occupational Therapy; Future; Expected date: 04/18/2023  -     meloxicam (MOBIC) 15 MG tablet; Take 1 tablet (15 mg total) by mouth once daily.  Dispense: 30 tablet; Refill: 2    Lumbar spondylosis  -     Ambulatory referral/consult to Physical/Occupational Therapy; Future; Expected date: 04/18/2023  -     meloxicam (MOBIC) 15 MG tablet; Take 1 tablet (15 mg total) by mouth once daily.  Dispense: 30 tablet; Refill: 2        Follow up in about 6 weeks (around 5/23/2023) for Lumbar PT f/u.    63-year-old male, he is a , he has had multiple falls from his Stilts.  He has had intermittent back pain from an initial fall back in September, he had a recent fall just within the last few weeks which have exacerbated his chronic low back pain.  He denies radicular symptoms.  X-ray shows multilevel degenerative changes facet arthritis.  Start him on a daily anti-inflammatory, refer him to physical therapy see if he gets any meaningful relief with these modalities.  If not plan would be to do an MRI to evaluate the facet joints and make recommendations to Pain Management for possible facet injections.    @[email or web address]@  JOAQUIN Amador PA-C    This note was created using [employer] voice recognition software that occasionally misinterprets words or phrases.

## 2023-04-18 DIAGNOSIS — E11.65 TYPE 2 DIABETES MELLITUS WITH HYPERGLYCEMIA, WITHOUT LONG-TERM CURRENT USE OF INSULIN: ICD-10-CM

## 2023-04-18 RX ORDER — DULAGLUTIDE 1.5 MG/.5ML
1.5 INJECTION, SOLUTION SUBCUTANEOUS
Qty: 4 PEN | Refills: 5 | Status: SHIPPED | OUTPATIENT
Start: 2023-04-18

## 2023-04-18 NOTE — TELEPHONE ENCOUNTER
Patient called stating he is out of Trulicity and his appointment is 5-3-23.  Last Office Visit 10-27-22  Next Office Visit 5-3-23

## 2023-05-08 ENCOUNTER — CLINICAL SUPPORT (OUTPATIENT)
Dept: REHABILITATION | Facility: HOSPITAL | Age: 63
End: 2023-05-08
Payer: MEDICAID

## 2023-05-08 DIAGNOSIS — R53.1 DECREASED STRENGTH: ICD-10-CM

## 2023-05-08 DIAGNOSIS — M47.816 LUMBAR SPONDYLOSIS: ICD-10-CM

## 2023-05-08 DIAGNOSIS — M47.816 LUMBAR FACET ARTHROPATHY: ICD-10-CM

## 2023-05-08 DIAGNOSIS — M62.89 MUSCLE TIGHTNESS: ICD-10-CM

## 2023-05-08 DIAGNOSIS — M51.36 DISC DEGENERATION, LUMBAR: ICD-10-CM

## 2023-05-08 PROCEDURE — 97110 THERAPEUTIC EXERCISES: CPT | Mod: PN

## 2023-05-08 PROCEDURE — 97161 PT EVAL LOW COMPLEX 20 MIN: CPT | Mod: PN

## 2023-05-08 NOTE — PLAN OF CARE
[facility]  Physical Therapy Initial Evaluation    Name: Gagan Escobar  Clinic Number: [id number]    Therapy Diagnosis:   Encounter Diagnoses   Name Primary?    Disc degeneration, lumbar     Lumbar facet arthropathy     Lumbar spondylosis     Decreased strength     Muscle tightness      Physician: Kirk Mott PA   Physician Orders: PT Eval and Treat  Medical Diagnosis from Referral: M51.36 (ICD-10-CM) - Disc degeneration, lumbar M47.816 (ICD-10-CM) - Lumbar facet arthropathy M47.816 (ICD-10-CM) - Lumbar spondylosis   Evaluation Date: 5/8/2023  Authorization Period Expiration: 06/01/2023   Plan of Care Expiration: 7/30/2023    Progress Update: 6/8/2023  FOTO: 1 / 3   Visit # / Visits authorized: 1 / 1       PRECAUTIONS: Standard Precautions     Time In: 1530  Time Out: 1610  Total Appointment Time (timed & untimed codes): 40 minutes    SUBJECTIVE     Chief complaint:  Low back pain    History of current condition:  Pain started about 1 year ago.  States that he fell off of stilts at work.  States that he has not had pain for about 1 month.  States that he does not think he needs to be here.  States that he would just like some exercises to perform at home in order to prevent pain from returning.  Denies numbness/tingling, bowel/bladder issues.         Previous episode:  none    Aggravating Factors:  picking up heavy objects, bending over, 1st steps in the morning  Easing Factors: lidocaine patches, movement    Imaging:      IMPRESSION:  1. Negative for acute lumbar spine fracture.  2. Multilevel lumbar degenerative disc disease and facet osteoarthritis.     Electronically signed by:  Nacho Hunt MD  10/28/2022 10:13 AM CDT Workstation: [id number]    Prior Therapy: N/A  Social History: Pt lives with their spouse  Occupation: Pt is a .  Prior Level of Function: Independent with all ADLs  Current Level of Function: 100% of PLOF    Pain:  Current 0 /10, worst 5 /10,  best 0 /10   Description: Dull and Throbbing    Pts goals: Pt reported goal is to be able to walk without pain.    _______________________________________________________  Medical History:   Past Medical History:   Diagnosis Date    Diabetes mellitus type 2 with complications     Hyperlipidemia     Hypertension        Surgical History:   Gagan Escobar  has a past surgical history that includes Neck surgery (02/2020) and Foot surgery.    Medications:   Gagan has a current medication list which includes the following prescription(s): aspirin, atorvastatin, trulicity, lancets, lantus solostar u-100 insulin, meloxicam, metformin, pen needle, diabetic, and valsartan.    Allergies:   Review of patient's allergies indicates:   Allergen Reactions    Ace inhibitors Rash    Pcn [penicillins]         OBJECTIVE   (x = not tested due to pain and/or inability to obtain test position)    RANGE OF MOTION:    Lumbar AROM/PROM Right  (spine)  5/8/2023 Left    5/8/2023 Goal   Lumbar Flexion (60) wfl --- 55     Lumbar Extension (30) Wfl c pain --- 30     Lumbar Side Bending (25) wfl wfl 20     Lumbar Rotation wfl wfl 45         Hip AROM/PROM Right  5/8/2023 Left  5/8/2023 Goal   Hip Flexion (120) wfl wfl 115     Hip IR (45) 38 c pain 38 c pain 40     Hip ER (45) wfl wfl 40         Knee AROM/PROM Right  5/8/2023 Left  5/8/2023 Goal   Hyper - Zero - Flexion wfl wfl 0-0-135         STRENGTH:    L/E MMT Right  5/8/2023 Goal   Hip Flexion  4/5 5/5 B    Hip Extension  4-/5 5/5 B   Hip Abduction  4/5 5/5 B   Hip IR 4/5 5/5 B   Hip ER 4/5 5/5 B   Knee Flexion 4/5 5/5 B   Knee Extension 4/5 5/5 B   Ankle DF 4/5 5/5 B   Ankle PF 4/5 5/5 B   Ankle Inversion 4/5 5/5 B   Ankle Eversion 4/5 5/5 B   Big Toe Extension 4/5 5/5 B     L/E MMT Left  5/8/2023 Goal   Hip Flexion  4/5 5/5 B    Hip Extension  4-/5 5/5 B   Hip Abduction  4/5 5/5 B   Hip IR 4/5 5/5 B   Hip ER 4/5 5/5 B   Knee Flexion 4/5 5/5 B   Knee Extension 4/5 5/5 B   Ankle DF 4/5  5/5 B   Ankle PF 4/5 5/5 B   Ankle Inversion 4/5 5/5 B   Ankle Eversion 4/5 5/5 B   Big Toe Extension 4/5 5/5 B       MUSCLE LENGTH:     Muscle Tested  Right  5/8/2023 Left   5/8/2023 Goal   Hip Flexors  decreased decreased Normal B   Quadriceps normal normal Normal B   Hamstrings  decreased decreased Normal B   Piriformis  decreased decreased Normal B   Gastrocnemius  decreased decreased Normal B   Soleus  decreased decreased Normal B       SPECIAL TESTS:     Right  (spine)  5/8/2023 Goal   SLR Negative Negative    Crossover SLR Negative Negative    Slump Negative Negative    90/90 Positive Negative   Cole Positive Negative   Standing forward flexion test Negative Negative   Sacral thrust Negaitve Negative   SIJ Distraction Negative Negative   SIJ Compression Negative Negative       Sensation:  Sensation is intact to light touch    Palpation:  TTP from L3-L5 spinous processes.    Posture:  Pt presents with postural abnormalities which include: forward head and rounded shoulders    Gait Analysis: The patient ambulated with the following assistive device: none;    Movement Analysis:   Functional Test  Outcome   Double Leg Squat 1/2 squat limited by knee pain   Single Leg Step Down NT       TREATMENT   Total Treatment time separate from Evaluation: (10) minutes    Marvin received therapeutic exercises to develop strength, endurance, ROM, flexibility, and posture for (10) minutes including: x = exercises performed     TherEx 5/8/2023    Seated hamstring stretch x 3x30 secs   Seated piriformis stretch x 3x30 secs   Standing gastroc stretch x 3x30 secs          Home Exercises and Patient Education Provided    Education/Self-Care provided:  Patient educated on the impairments noted above and the effects of physical therapy intervention to improve overall condition and quality of life.   Patient was educated on all the above exercise prior/during/after for proper posture, positioning, and execution for safe performance  with home exercise program.     Written Home Exercises Provided: yes. Prefers: Electronic Copies  Exercises were reviewed and Marvin was able to demonstrate them prior to the end of the session.  Marvin demonstrated good understanding of the education provided.     See EMR under Patient Instructions for exercises provided during therapy sessions.    ASSESSMENT   Gagan is a 63 y.o. male referred to outpatient Physical Therapy with a medical diagnosis of M51.36 (ICD-10-CM) - Disc degeneration, lumbar M47.816 (ICD-10-CM) - Lumbar facet arthropathy M47.816 (ICD-10-CM) - Lumbar spondylosis . Gagan presents with clinical signs and symptoms that support this diagnosis with decreased Lumbar ROM, decreased lower extremity strength, Lumbar joint(s) hypomobility, lower extremity neural tension, and impaired functional mobility. Radicular symptoms are not present.    The above impairments will be addressed through manual therapy techniques, therapeutic exercises, functional training, and modalities as necessary. Patient was treated and educated on exercises for home program, progression of therapy, and benefits of therapy to achieve full functional mobility. Patient will benefit from skilled physical therapy to meet short and long term goals and return to prior level of function.    Pt prognosis is Good.   Pt will benefit from skilled outpatient Physical Therapy to address the deficits stated above and in the chart below, provide pt/family education, and to maximize pt's level of independence.     Plan of care discussed with patient: Yes  Pt's spiritual, cultural and educational needs considered and patient is agreeable to the plan of care and goals as stated below:     Anticipated Barriers for therapy: none    Medical Necessity is demonstrated by the following:   History  Co-morbidities and personal factors that may impact the plan of care Co-morbidities:   advanced age    Personal Factors:   no deficits     low  "  Examination  Body Structures and Functions, activity limitations and participation restrictions that may impact the plan of care Body Regions:   back    Body Systems:    gross symmetry  strength  gross coordinated movement  balance  gait  motor control  motor learning    Participation Restrictions:   See above in "Current Level of Function"     Activity limitations:   Learning and applying knowledge  no deficits    General Tasks and Commands  no deficits    Communication  no deficits    Mobility  no deficits    Self care  no deficits    Domestic Life  no deficits    Interactions/Relationships  no deficits    Life Areas  no deficits    Community and Social Life  community life  recreation and leisure  no deficits         low   Clinical Presentation stable and uncomplicated low   Decision Making/ Complexity Score: low       GOALS:  SHORT TERM GOALS: 4 weeks 5/8/2023   Recent signs and systems trend is improving in order to progress towards LTG's.    Patient will be independent with HEP in order to further progress and return to maximal function.    Pain rating at Worst: 5/10 in order to progress towards increased independence with activity.    Patient will be able to correct postural deviations in sitting and standing, to decrease pain and promote postural awareness for injury prevention.       LONG TERM GOALS: 8 weeks 5/8/2023   Patient will return to normal ADL, recreational, and work related activities with less pain and limitation.     Patient will improve AROM to stated goals in order to return to maximal functional potential.     Patient will improve Strength to stated goals of appropriate musculature in order to improve functional independence.     Pain Rating at Best: 1/10 to improve Quality of Life.     Patient will meet predicted functional outcome (FOTO) score: 100% to increase self-worth & perceived functional ability.    Patient will have met/partially met personal goal of being able to walk without " pain.          PLAN   Plan of care Certification: 5/8/2023 to 7/30/2023    Outpatient Physical Therapy 2 times weekly for 8 weeks to include any combination of the following interventions: virtual visits, dry needling, modalities, electrical stimulation (IFC, Pre-Mod, Attended with Functional Dry Needling), Manual Therapy, Moist Heat/ Ice, Neuromuscular Re-ed, Patient Education, Self Care, Therapeutic Exercise, Functional Training, and Therapeutic Activites     Thank you for this referral.    These services are reasonable and necessary for the conditions set forth above while under my care.    Trevor Carter, PT, DPT

## 2023-05-18 ENCOUNTER — TELEPHONE (OUTPATIENT)
Dept: FAMILY MEDICINE | Facility: CLINIC | Age: 63
End: 2023-05-18

## 2023-05-18 DIAGNOSIS — I10 ESSENTIAL HYPERTENSION: Primary | ICD-10-CM

## 2023-05-18 NOTE — TELEPHONE ENCOUNTER
Pharmacy sent Drug Change Request for valsartan stating it is not covered by the patients plan. The preferred alternative is :  Losartan Pot Tab   Olmesa Meox Tab  Irbesartant Tab  Request scanned in media.    Last Office Visit  10-27-22  Cx 4-27-23  Cx 5-3-23  Next Office Visit 8-21-23

## 2023-05-19 RX ORDER — OLMESARTAN MEDOXOMIL 20 MG/1
20 TABLET ORAL DAILY
Qty: 30 TABLET | Refills: 1 | Status: SHIPPED | OUTPATIENT
Start: 2023-05-19 | End: 2023-07-28

## 2023-05-19 NOTE — TELEPHONE ENCOUNTER
Patient notified and verbalized understanding. Patient says he will call back once he starts taking new medication, to get appt.

## 2023-05-19 NOTE — TELEPHONE ENCOUNTER
Please tell patient that I will change his medication to the preferred drug; he should stop taking the valsartan when he starts the new medication olmesartan; need appointment in 1 month for blood pressure recheck since we are changing drugs

## 2023-05-31 ENCOUNTER — OFFICE VISIT (OUTPATIENT)
Dept: ORTHOPEDICS | Facility: CLINIC | Age: 63
End: 2023-05-31
Payer: MEDICAID

## 2023-05-31 VITALS
SYSTOLIC BLOOD PRESSURE: 138 MMHG | HEIGHT: 72 IN | WEIGHT: 224 LBS | DIASTOLIC BLOOD PRESSURE: 92 MMHG | BODY MASS INDEX: 30.34 KG/M2

## 2023-05-31 DIAGNOSIS — M47.816 LUMBAR FACET ARTHROPATHY: ICD-10-CM

## 2023-05-31 DIAGNOSIS — M47.816 LUMBAR SPONDYLOSIS: ICD-10-CM

## 2023-05-31 DIAGNOSIS — M51.36 DISC DEGENERATION, LUMBAR: Primary | ICD-10-CM

## 2023-05-31 PROCEDURE — 99213 OFFICE O/P EST LOW 20 MIN: CPT | Mod: S$GLB,,, | Performed by: PHYSICIAN ASSISTANT

## 2023-05-31 PROCEDURE — 3080F PR MOST RECENT DIASTOLIC BLOOD PRESSURE >= 90 MM HG: ICD-10-PCS | Mod: CPTII,S$GLB,, | Performed by: PHYSICIAN ASSISTANT

## 2023-05-31 PROCEDURE — 3008F PR BODY MASS INDEX (BMI) DOCUMENTED: ICD-10-PCS | Mod: CPTII,S$GLB,, | Performed by: PHYSICIAN ASSISTANT

## 2023-05-31 PROCEDURE — 4010F PR ACE/ARB THEARPY RXD/TAKEN: ICD-10-PCS | Mod: CPTII,S$GLB,, | Performed by: PHYSICIAN ASSISTANT

## 2023-05-31 PROCEDURE — 1160F PR REVIEW ALL MEDS BY PRESCRIBER/CLIN PHARMACIST DOCUMENTED: ICD-10-PCS | Mod: CPTII,S$GLB,, | Performed by: PHYSICIAN ASSISTANT

## 2023-05-31 PROCEDURE — 99213 PR OFFICE/OUTPT VISIT, EST, LEVL III, 20-29 MIN: ICD-10-PCS | Mod: S$GLB,,, | Performed by: PHYSICIAN ASSISTANT

## 2023-05-31 PROCEDURE — 4010F ACE/ARB THERAPY RXD/TAKEN: CPT | Mod: CPTII,S$GLB,, | Performed by: PHYSICIAN ASSISTANT

## 2023-05-31 PROCEDURE — 3008F BODY MASS INDEX DOCD: CPT | Mod: CPTII,S$GLB,, | Performed by: PHYSICIAN ASSISTANT

## 2023-05-31 PROCEDURE — 3075F PR MOST RECENT SYSTOLIC BLOOD PRESS GE 130-139MM HG: ICD-10-PCS | Mod: CPTII,S$GLB,, | Performed by: PHYSICIAN ASSISTANT

## 2023-05-31 PROCEDURE — 3075F SYST BP GE 130 - 139MM HG: CPT | Mod: CPTII,S$GLB,, | Performed by: PHYSICIAN ASSISTANT

## 2023-05-31 PROCEDURE — 3080F DIAST BP >= 90 MM HG: CPT | Mod: CPTII,S$GLB,, | Performed by: PHYSICIAN ASSISTANT

## 2023-05-31 PROCEDURE — 1159F MED LIST DOCD IN RCRD: CPT | Mod: CPTII,S$GLB,, | Performed by: PHYSICIAN ASSISTANT

## 2023-05-31 PROCEDURE — 1159F PR MEDICATION LIST DOCUMENTED IN MEDICAL RECORD: ICD-10-PCS | Mod: CPTII,S$GLB,, | Performed by: PHYSICIAN ASSISTANT

## 2023-05-31 PROCEDURE — 1160F RVW MEDS BY RX/DR IN RCRD: CPT | Mod: CPTII,S$GLB,, | Performed by: PHYSICIAN ASSISTANT

## 2023-05-31 NOTE — PROGRESS NOTES
"  [facility]  [address] [facility] [address] [facility] [address] [place] [address]  Phone: [phone]   [phone]    Patient's PCP: MAROCS Zuniga  Referring Provider: No ref. provider found    Subjective:      Chief Complaint:   Chief Complaint   Patient presents with    Spine - Pain     Pt has come in to f/u on lumbar pain, pt stated he went to PT but PT stated that he did not need to be seen there since he has no pain.       Past Medical History:   Diagnosis Date    Diabetes mellitus type 2 with complications     Hyperlipidemia     Hypertension        Past Surgical History:   Procedure Laterality Date    FOOT SURGERY      NECK SURGERY  02/2020       Current Outpatient Medications   Medication Sig    aspirin (ECOTRIN) 81 MG EC tablet Take 1 tablet (81 mg total) by mouth once daily.    atorvastatin (LIPITOR) 20 MG tablet Take 1 tablet (20 mg total) by mouth every evening.    dulaglutide (TRULICITY) 1.5 mg/0.5 mL pen injector Inject 1.5 mg into the skin every 7 days.    lancets Misc To check BG 2 times daily, to use with insurance preferred meter    LANTUS SOLOSTAR U-100 INSULIN glargine 100 units/mL (3mL) SubQ pen ADMINISTER 30 UNITS UNDER THE SKIN EVERY EVENING    meloxicam (MOBIC) 15 MG tablet Take 1 tablet (15 mg total) by mouth once daily.    metFORMIN (GLUCOPHAGE-XR) 500 MG ER 24hr tablet TAKE 2 TABLETS(1000 MG) BY MOUTH DAILY WITH BREAKFAST    olmesartan (BENICAR) 20 MG tablet Take 1 tablet (20 mg total) by mouth once daily.    pen needle, diabetic (BD ULTRA-FINE SHORT PEN NEEDLE) 31 gauge x 5/16" Ndle USE 1 PEN NEEDLE DAILY TO ADMINISTER INSULIN     No current facility-administered medications for this visit.       Review of patient's allergies indicates:   Allergen Reactions    Ace inhibitors Rash    Pcn [penicillins]        Family History   Problem Relation Age of Onset    Heart disease Mother     Mental illness Mother     Asthma Father        Social History     Socioeconomic History    Marital status: "    Tobacco Use    Smoking status: Never    Smokeless tobacco: Current   Substance and Sexual Activity    Alcohol use: Yes    Drug use: No    Sexual activity: Yes       Prior to meeting with the patient I reviewed the medical chart in [facility]. This included reviewing the previous progress notes from our office, review of the patient's last appointment with their primary care provider, review of any visits to the emergency room, and review of any pain management appointments or procedures.    History of present illness: 63-year-old male, returns to clinic today for follow-up evaluation of complaints of chronic low back pain.  States since I saw him last in April, he really has had no pain.  He took the anti-inflammatory for few days.  He went to physical therapy they taught him a few stretches.  Played golf the other day he is doing great.    Came to us as a new patient in April with complaints of lower back pain since a fall in 9/22. He was on a ladder hanging drywall at work when he fell onto his left side.  Complains of tightness, deep pain since especially in his left lower back. Seen in ER and had CT abd/pelvis but no back imaging.  Plain x-rays ordered by primary care showed multilevel degenerative changes facet arthritis. Tried chiropractor and helped minimally after first visit.       Review of Systems:    Constitutional: Negative for chills, fever and weight loss.   HENT: Negative for congestion.    Eyes: Negative for discharge and redness.   Respiratory: Negative for cough and shortness of breath.    Cardiovascular: Negative for chest pain.   Gastrointestinal: Negative for nausea and vomiting.   Musculoskeletal: See HPI.   Skin: Negative for rash.   Neurological: Negative for headaches.   Endo/Heme/Allergies: Does not bruise/bleed easily.   Psychiatric/Behavioral: The patient is not nervous/anxious.    All other systems reviewed and are negative.       Objective:      Physical Examination:    Vital  Signs:    Vitals:    05/31/23 1333   BP: (!) 138/92       Body mass index is 30.38 kg/m².    This a well-developed, well nourished patient in no acute distress.  They are alert and oriented and cooperative to examination.     Examination lumbar spine, no significant limitations in range of motion.  Patient ambulates with a nonantalgic gait, not antalgic sit to stand.  Walks without an assistive device. Stands erect.      Pertinent New Results:        XRAY Report / Interpretation:             Assessment:       1. Disc degeneration, lumbar    2. Lumbar facet arthropathy    3. Lumbar spondylosis      Plan:     Disc degeneration, lumbar    Lumbar facet arthropathy    Lumbar spondylosis        Follow up if symptoms worsen or fail to improve.    63-year-old male with improvement in his low back pain with oral anti-inflammatories and physical therapy.  He has no pain today.  Simply follow up with us p.cecile.      Kirk Mott, JOAQUIN, PADejonC    This note was created using [employer] voice recognition software that occasionally misinterprets words or phrases.

## 2023-06-15 DIAGNOSIS — E11.65 TYPE 2 DIABETES MELLITUS WITH HYPERGLYCEMIA, WITHOUT LONG-TERM CURRENT USE OF INSULIN: ICD-10-CM

## 2023-06-15 RX ORDER — PEN NEEDLE, DIABETIC 30 GX3/16"
NEEDLE, DISPOSABLE MISCELLANEOUS
Qty: 100 EACH | Refills: 5 | Status: SHIPPED | OUTPATIENT
Start: 2023-06-15

## 2023-06-15 RX ORDER — INSULIN GLARGINE 100 [IU]/ML
INJECTION, SOLUTION SUBCUTANEOUS
Qty: 15 ML | Refills: 5 | Status: SHIPPED | OUTPATIENT
Start: 2023-06-15

## 2023-07-28 DIAGNOSIS — I10 ESSENTIAL HYPERTENSION: ICD-10-CM

## 2023-07-28 RX ORDER — OLMESARTAN MEDOXOMIL 20 MG/1
TABLET ORAL
Qty: 30 TABLET | Refills: 1 | Status: SHIPPED | OUTPATIENT
Start: 2023-07-28 | End: 2023-08-21 | Stop reason: SDUPTHER

## 2023-08-21 ENCOUNTER — OFFICE VISIT (OUTPATIENT)
Dept: FAMILY MEDICINE | Facility: CLINIC | Age: 63
End: 2023-08-21
Payer: MEDICAID

## 2023-08-21 VITALS
SYSTOLIC BLOOD PRESSURE: 138 MMHG | RESPIRATION RATE: 20 BRPM | OXYGEN SATURATION: 97 % | BODY MASS INDEX: 29.62 KG/M2 | WEIGHT: 218.69 LBS | HEART RATE: 113 BPM | HEIGHT: 72 IN | TEMPERATURE: 98 F | DIASTOLIC BLOOD PRESSURE: 92 MMHG

## 2023-08-21 DIAGNOSIS — E66.3 OVERWEIGHT WITH BODY MASS INDEX (BMI) 25.0-29.9: ICD-10-CM

## 2023-08-21 DIAGNOSIS — E78.2 MIXED HYPERLIPIDEMIA: ICD-10-CM

## 2023-08-21 DIAGNOSIS — Z12.5 SCREENING PSA (PROSTATE SPECIFIC ANTIGEN): ICD-10-CM

## 2023-08-21 DIAGNOSIS — E11.65 TYPE 2 DIABETES MELLITUS WITH HYPERGLYCEMIA, WITHOUT LONG-TERM CURRENT USE OF INSULIN: ICD-10-CM

## 2023-08-21 DIAGNOSIS — I10 ESSENTIAL HYPERTENSION: Primary | ICD-10-CM

## 2023-08-21 PROCEDURE — 3075F PR MOST RECENT SYSTOLIC BLOOD PRESS GE 130-139MM HG: ICD-10-PCS | Mod: CPTII,,, | Performed by: NURSE PRACTITIONER

## 2023-08-21 PROCEDURE — 3008F PR BODY MASS INDEX (BMI) DOCUMENTED: ICD-10-PCS | Mod: CPTII,,, | Performed by: NURSE PRACTITIONER

## 2023-08-21 PROCEDURE — 3080F PR MOST RECENT DIASTOLIC BLOOD PRESSURE >= 90 MM HG: ICD-10-PCS | Mod: CPTII,,, | Performed by: NURSE PRACTITIONER

## 2023-08-21 PROCEDURE — 99214 PR OFFICE/OUTPT VISIT, EST, LEVL IV, 30-39 MIN: ICD-10-PCS | Mod: S$PBB,,, | Performed by: NURSE PRACTITIONER

## 2023-08-21 PROCEDURE — 3080F DIAST BP >= 90 MM HG: CPT | Mod: CPTII,,, | Performed by: NURSE PRACTITIONER

## 2023-08-21 PROCEDURE — 1160F RVW MEDS BY RX/DR IN RCRD: CPT | Mod: CPTII,,, | Performed by: NURSE PRACTITIONER

## 2023-08-21 PROCEDURE — 4010F PR ACE/ARB THEARPY RXD/TAKEN: ICD-10-PCS | Mod: CPTII,,, | Performed by: NURSE PRACTITIONER

## 2023-08-21 PROCEDURE — 99214 OFFICE O/P EST MOD 30 MIN: CPT | Mod: S$PBB,,, | Performed by: NURSE PRACTITIONER

## 2023-08-21 PROCEDURE — 1159F MED LIST DOCD IN RCRD: CPT | Mod: CPTII,,, | Performed by: NURSE PRACTITIONER

## 2023-08-21 PROCEDURE — 3075F SYST BP GE 130 - 139MM HG: CPT | Mod: CPTII,,, | Performed by: NURSE PRACTITIONER

## 2023-08-21 PROCEDURE — 4010F ACE/ARB THERAPY RXD/TAKEN: CPT | Mod: CPTII,,, | Performed by: NURSE PRACTITIONER

## 2023-08-21 PROCEDURE — 1160F PR REVIEW ALL MEDS BY PRESCRIBER/CLIN PHARMACIST DOCUMENTED: ICD-10-PCS | Mod: CPTII,,, | Performed by: NURSE PRACTITIONER

## 2023-08-21 PROCEDURE — 3008F BODY MASS INDEX DOCD: CPT | Mod: CPTII,,, | Performed by: NURSE PRACTITIONER

## 2023-08-21 PROCEDURE — 1159F PR MEDICATION LIST DOCUMENTED IN MEDICAL RECORD: ICD-10-PCS | Mod: CPTII,,, | Performed by: NURSE PRACTITIONER

## 2023-08-21 PROCEDURE — 99215 OFFICE O/P EST HI 40 MIN: CPT | Performed by: NURSE PRACTITIONER

## 2023-08-21 RX ORDER — OLMESARTAN MEDOXOMIL 40 MG/1
40 TABLET ORAL DAILY
Qty: 90 TABLET | Refills: 1 | Status: SHIPPED | OUTPATIENT
Start: 2023-08-21

## 2023-08-21 NOTE — PROGRESS NOTES
SUBJECTIVE:      Patient ID: Gagan Escobar is a 63 y.o. male.    Chief Complaint: Annual Exam and Diabetes    Established patient here for follow-up on diabetes, HTN and HLD.  Last A1C 6.3 in 2022- needs recheck.  Has been intermittently dealing with supply issues with Trulicity, but seems to be improving. He is taking all of his medications as prescribed and has been trying to eat better since his last visit.  today. HTN uncontrolled since insurance would not cover valsartan- now on Benicar daily 20 mg. [place] in the heat and has been sweating a lot - was very dehydrated a few months ago and thinks he had heat exhaustion, but doing ok since drinking water/electrolytes and going home by 12    Diabetes  He presents for his follow-up diabetic visit. He has type 2 diabetes mellitus. His disease course has been stable. There are no hypoglycemic associated symptoms. Pertinent negatives for hypoglycemia include no confusion, dizziness, headaches, nervousness/anxiousness, pallor or sweats. Pertinent negatives for diabetes include no blurred vision, no chest pain, no fatigue, no foot paresthesias, no foot ulcerations, no polydipsia, no polyphagia, no polyuria, no visual change, no weakness and no weight loss. There are no hypoglycemic complications. Symptoms are stable. Diabetic complications include impotence and peripheral neuropathy. Pertinent negatives for diabetic complications include no CVA. Risk factors for coronary artery disease include diabetes mellitus, dyslipidemia, family history, male sex, hypertension, obesity and sedentary lifestyle. Current diabetic treatment includes oral agent (monotherapy), diet and insulin injections (Trulicity). He is compliant with treatment all of the time. His weight is decreasing steadily. He is following a generally healthy diet. When asked about meal planning, he reported none. He has not had a previous visit with a dietitian. He rarely participates in  exercise. His home blood glucose trend is fluctuating minimally. His breakfast blood glucose range is generally 140-180 mg/dl. An ACE inhibitor/angiotensin II receptor blocker is being taken. He does not see a podiatrist.Eye exam is current (needs to schedule).   Hyperlipidemia  This is a chronic problem. The current episode started more than 1 year ago. The problem is controlled. Recent lipid tests were reviewed and are normal. Exacerbating diseases include obesity. He has no history of chronic renal disease, diabetes, hypothyroidism or liver disease. There are no known factors aggravating his hyperlipidemia. Pertinent negatives include no chest pain, leg pain, myalgias or shortness of breath. Current antihyperlipidemic treatment includes statins and diet change. The current treatment provides moderate improvement of lipids. Compliance problems include adherence to diet and adherence to exercise.  Risk factors for coronary artery disease include hypertension, male sex, obesity, diabetes mellitus, dyslipidemia and family history.   Hypertension  This is a chronic problem. The current episode started more than 1 year ago. The problem is uncontrolled. Pertinent negatives include no anxiety, blurred vision, chest pain, headaches, malaise/fatigue, palpitations, peripheral edema, shortness of breath or sweats. There are no associated agents to hypertension. Risk factors for coronary artery disease include male gender, dyslipidemia and diabetes mellitus. Past treatments include angiotensin blockers and lifestyle changes. The current treatment provides moderate improvement. Compliance problems include exercise and diet.  There is no history of angina, kidney disease, CAD/MI, CVA or heart failure. There is no history of chronic renal disease, renovascular disease, sleep apnea or a thyroid problem.       Family History   Problem Relation Age of Onset    Heart disease Mother     Mental illness Mother     Asthma Father      "  Social History     Socioeconomic History    Marital status:    Tobacco Use    Smoking status: Never    Smokeless tobacco: Current   Substance and Sexual Activity    Alcohol use: Yes    Drug use: No    Sexual activity: Yes     Social Determinants of Health     Stress: No Stress Concern Present (10/9/2020)    [employer] - Occupational Stress Questionnaire     Feeling of Stress : Not at all     Current Outpatient Medications   Medication Sig Dispense Refill    aspirin (ECOTRIN) 81 MG EC tablet Take 1 tablet (81 mg total) by mouth once daily. 90 tablet 1    atorvastatin (LIPITOR) 20 MG tablet Take 1 tablet (20 mg total) by mouth every evening. 90 tablet 1    dulaglutide (TRULICITY) 1.5 mg/0.5 mL pen injector Inject 1.5 mg into the skin every 7 days. 4 pen 5    insulin (LANTUS SOLOSTAR U-100 INSULIN) glargine 100 units/mL SubQ pen ADMINISTER 30 UNITS UNDER THE SKIN EVERY EVENING 15 mL 5    metFORMIN (GLUCOPHAGE-XR) 500 MG ER 24hr tablet TAKE 2 TABLETS(1000 MG) BY MOUTH DAILY WITH BREAKFAST 180 tablet 1    lancets Misc To check BG 2 times daily, to use with insurance preferred meter 100 each 5    olmesartan (BENICAR) 40 MG tablet Take 1 tablet (40 mg total) by mouth once daily. 90 tablet 1    pen needle, diabetic (BD ULTRA-FINE SHORT PEN NEEDLE) 31 gauge x 5/16" Ndle USE 1 PEN NEEDLE DAILY TO ADMINISTER INSULIN 100 each 5     No current facility-administered medications for this visit.     Review of patient's allergies indicates:   Allergen Reactions    Ace inhibitors Rash    Pcn [penicillins]       Past Medical History:   Diagnosis Date    Diabetes mellitus type 2 with complications     Hyperlipidemia     Hypertension      Past Surgical History:   Procedure Laterality Date    FOOT SURGERY      NECK SURGERY  02/2020       Review of Systems   Constitutional:  Negative for appetite change, chills, fatigue, fever, malaise/fatigue, unexpected weight change and weight loss.   HENT:  " Negative for congestion and sore throat.    Eyes:  Negative for blurred vision, pain and visual disturbance.   Respiratory:  Negative for cough and shortness of breath.    Cardiovascular:  Negative for chest pain, palpitations and leg swelling.   Gastrointestinal:  Negative for abdominal pain, blood in stool, diarrhea, nausea and vomiting.   Endocrine: Negative for cold intolerance, heat intolerance, polydipsia, polyphagia and polyuria.   Genitourinary:  Positive for impotence. Negative for dysuria, frequency, hematuria and urgency.   Musculoskeletal:  Negative for back pain and myalgias.   Skin:  Negative for color change, pallor, rash and wound.   Allergic/Immunologic: Positive for environmental allergies.   Neurological:  Negative for dizziness, weakness, numbness and headaches.   Hematological:  Negative for adenopathy. Does not bruise/bleed easily.   Psychiatric/Behavioral:  Negative for confusion, dysphoric mood, sleep disturbance and suicidal ideas. The patient is not nervous/anxious.       OBJECTIVE:      Vitals:    08/21/23 1256 08/21/23 1300   BP: (!) 144/96 (!) 138/92   BP Location: Left arm Left arm   Patient Position: Sitting Sitting   BP Method: Large (Manual) Large (Manual)   Pulse: (!) 113    Resp: 20    Temp: 98.1 °F (36.7 °C)    TempSrc: Oral    SpO2: 97%    Weight: 99.2 kg (218 lb 11.2 oz)    Height: 6' (1.829 m)      Physical Exam  Vitals and nursing note reviewed. Exam conducted with a chaperone present.   Constitutional:       General: He is not in acute distress.     Appearance: Normal appearance. He is well-developed. He is obese. He is not ill-appearing.   HENT:      Head: Normocephalic.      Nose: Nose normal.      Mouth/Throat:      Mouth: Mucous membranes are moist.   Eyes:      General: No scleral icterus.     Pupils: Pupils are equal, round, and reactive to light.   Neck:      Thyroid: No thyroid mass or thyromegaly.      Trachea: Trachea normal.   Cardiovascular:      Rate and Rhythm:  Regular rhythm. Tachycardia present.      Pulses: Normal pulses.      Heart sounds: Normal heart sounds. No murmur heard.  Pulmonary:      Effort: Pulmonary effort is normal. No respiratory distress.      Breath sounds: Normal breath sounds. No wheezing or rales.   Musculoskeletal:         General: Normal range of motion.      Cervical back: Normal range of motion and neck supple.      Right lower leg: No edema.      Left lower leg: No edema.   Lymphadenopathy:      Cervical: No cervical adenopathy.   Skin:     General: Skin is warm and dry.      Coloration: Skin is not jaundiced or pale.   Neurological:      Mental Status: He is alert and oriented to person, place, and time.   Psychiatric:         Mood and Affect: Mood normal.         Behavior: Behavior normal.         Thought Content: Thought content normal.         Judgment: Judgment normal.        Assessment:       1. Essential hypertension    2. Type 2 diabetes mellitus with hyperglycemia, without long-term current use of insulin    3. Mixed hyperlipidemia    4. Screening PSA (prostate specific antigen)    5. Overweight with body mass index (BMI) 25.0-29.9        Plan:       Essential hypertension  -     CBC Auto Differential; Future; Expected date: 08/21/2023  -     Comprehensive Metabolic Panel; Future; Expected date: 08/21/2023  -     TSH; Future; Expected date: 08/21/2023  -     olmesartan (BENICAR) 40 MG tablet; Take 1 tablet (40 mg total) by mouth once daily.  Dispense: 90 tablet; Refill: 1  -increase to 40 mg daily; get labs done, continue monitoring at home; recheck in 1 mo     Type 2 diabetes mellitus with hyperglycemia, without long-term current use of insulin  -     Comprehensive Metabolic Panel; Future; Expected date: 08/21/2023  -     Lipid Panel; Future; Expected date: 08/21/2023  -     Urinalysis; Future; Expected date: 08/21/2023  -     Microalbumin/Creatinine Ratio, Urine; Future; Expected date: 08/21/2023  -     Hemoglobin A1C; Future;  Expected date: 08/21/2023  -need updated labs; cont current meds for now; consider switching pharmacy if needed; eye exam due next month; foot exam at follow up     Mixed hyperlipidemia  -     Comprehensive Metabolic Panel; Future; Expected date: 08/21/2023  -     Lipid Panel; Future; Expected date: 08/21/2023  -check labs; continue statin daily    Screening PSA (prostate specific antigen)  -     PSA, Screening; Future; Expected date: 08/21/2023    Overweight with body mass index (BMI) 25.0-29.9        Follow up in about 1 month (around 9/21/2023) for f/u HTN, DM, labs.      8/21/2023 Meri Galeano, PRIMO, FNP    This note was created using [email or web address] voice recognition software that occasionally misinterprets phrases or words.

## 2023-08-23 ENCOUNTER — LAB VISIT (OUTPATIENT)
Dept: LAB | Facility: HOSPITAL | Age: 63
End: 2023-08-23
Attending: NURSE PRACTITIONER
Payer: MEDICAID

## 2023-08-23 DIAGNOSIS — Z12.5 SCREENING PSA (PROSTATE SPECIFIC ANTIGEN): ICD-10-CM

## 2023-08-23 DIAGNOSIS — E78.2 MIXED HYPERLIPIDEMIA: ICD-10-CM

## 2023-08-23 DIAGNOSIS — E11.65 TYPE 2 DIABETES MELLITUS WITH HYPERGLYCEMIA, WITHOUT LONG-TERM CURRENT USE OF INSULIN: ICD-10-CM

## 2023-08-23 DIAGNOSIS — I10 ESSENTIAL HYPERTENSION: ICD-10-CM

## 2023-08-23 LAB
ALBUMIN SERPL BCP-MCNC: 4.2 G/DL (ref 3.5–5.2)
ALBUMIN/CREAT UR: 3.6 UG/MG (ref 0–30)
ALP SERPL-CCNC: 45 U/L (ref 55–135)
ALT SERPL W/O P-5'-P-CCNC: 28 U/L (ref 10–44)
ANION GAP SERPL CALC-SCNC: 5 MMOL/L (ref 8–16)
AST SERPL-CCNC: 25 U/L (ref 10–40)
BASOPHILS # BLD AUTO: 0.06 K/UL (ref 0–0.2)
BASOPHILS NFR BLD: 0.9 % (ref 0–1.9)
BILIRUB SERPL-MCNC: 1.4 MG/DL (ref 0.1–1)
BILIRUB UR QL STRIP: NEGATIVE
BUN SERPL-MCNC: 14 MG/DL (ref 8–23)
CALCIUM SERPL-MCNC: 9.7 MG/DL (ref 8.7–10.5)
CHLORIDE SERPL-SCNC: 106 MMOL/L (ref 95–110)
CHOLEST SERPL-MCNC: 217 MG/DL (ref 120–199)
CHOLEST/HDLC SERPL: 4.4 {RATIO} (ref 2–5)
CLARITY UR: CLEAR
CO2 SERPL-SCNC: 26 MMOL/L (ref 23–29)
COLOR UR: YELLOW
COMPLEXED PSA SERPL-MCNC: 3.9 NG/ML (ref 0–4)
CREAT SERPL-MCNC: 1 MG/DL (ref 0.5–1.4)
CREAT UR-MCNC: 247 MG/DL (ref 23–375)
DIFFERENTIAL METHOD: NORMAL
EOSINOPHIL # BLD AUTO: 0.4 K/UL (ref 0–0.5)
EOSINOPHIL NFR BLD: 6.3 % (ref 0–8)
ERYTHROCYTE [DISTWIDTH] IN BLOOD BY AUTOMATED COUNT: 13.2 % (ref 11.5–14.5)
EST. GFR  (NO RACE VARIABLE): >60 ML/MIN/1.73 M^2
ESTIMATED AVG GLUCOSE: 157 MG/DL (ref 68–131)
GLUCOSE SERPL-MCNC: 135 MG/DL (ref 70–110)
GLUCOSE UR QL STRIP: ABNORMAL
HBA1C MFR BLD: 7.1 % (ref 4.5–6.2)
HCT VFR BLD AUTO: 50.5 % (ref 40–54)
HDLC SERPL-MCNC: 49 MG/DL (ref 40–75)
HDLC SERPL: 22.6 % (ref 20–50)
HGB BLD-MCNC: 16.8 G/DL (ref 14–18)
HGB UR QL STRIP: NEGATIVE
IMM GRANULOCYTES # BLD AUTO: 0.01 K/UL (ref 0–0.04)
IMM GRANULOCYTES NFR BLD AUTO: 0.1 % (ref 0–0.5)
KETONES UR QL STRIP: NEGATIVE
LDLC SERPL CALC-MCNC: 146.8 MG/DL (ref 63–159)
LEUKOCYTE ESTERASE UR QL STRIP: NEGATIVE
LYMPHOCYTES # BLD AUTO: 2.5 K/UL (ref 1–4.8)
LYMPHOCYTES NFR BLD: 36.9 % (ref 18–48)
MCH RBC QN AUTO: 30.1 PG (ref 27–31)
MCHC RBC AUTO-ENTMCNC: 33.3 G/DL (ref 32–36)
MCV RBC AUTO: 90 FL (ref 82–98)
MICROALBUMIN UR DL<=1MG/L-MCNC: 8.9 UG/ML
MONOCYTES # BLD AUTO: 0.8 K/UL (ref 0.3–1)
MONOCYTES NFR BLD: 11.2 % (ref 4–15)
NEUTROPHILS # BLD AUTO: 3.1 K/UL (ref 1.8–7.7)
NEUTROPHILS NFR BLD: 44.6 % (ref 38–73)
NITRITE UR QL STRIP: NEGATIVE
NONHDLC SERPL-MCNC: 168 MG/DL
NRBC BLD-RTO: 0 /100 WBC
PH UR STRIP: 6 [PH] (ref 5–8)
PLATELET # BLD AUTO: 336 K/UL (ref 150–450)
PMV BLD AUTO: 9.3 FL (ref 9.2–12.9)
POTASSIUM SERPL-SCNC: 4.1 MMOL/L (ref 3.5–5.1)
PROT SERPL-MCNC: 7.4 G/DL (ref 6–8.4)
PROT UR QL STRIP: ABNORMAL
RBC # BLD AUTO: 5.59 M/UL (ref 4.6–6.2)
SODIUM SERPL-SCNC: 137 MMOL/L (ref 136–145)
SP GR UR STRIP: 1.03 (ref 1–1.03)
TRIGL SERPL-MCNC: 106 MG/DL (ref 30–150)
TSH SERPL DL<=0.005 MIU/L-ACNC: 4.87 UIU/ML (ref 0.34–5.6)
URN SPEC COLLECT METH UR: ABNORMAL
UROBILINOGEN UR STRIP-ACNC: NEGATIVE EU/DL
WBC # BLD AUTO: 6.86 K/UL (ref 3.9–12.7)

## 2023-08-23 PROCEDURE — 85025 COMPLETE CBC W/AUTO DIFF WBC: CPT | Performed by: NURSE PRACTITIONER

## 2023-08-23 PROCEDURE — 80053 COMPREHEN METABOLIC PANEL: CPT | Performed by: NURSE PRACTITIONER

## 2023-08-23 PROCEDURE — 83036 HEMOGLOBIN GLYCOSYLATED A1C: CPT | Performed by: NURSE PRACTITIONER

## 2023-08-23 PROCEDURE — 82043 UR ALBUMIN QUANTITATIVE: CPT | Performed by: NURSE PRACTITIONER

## 2023-08-23 PROCEDURE — 84443 ASSAY THYROID STIM HORMONE: CPT | Performed by: NURSE PRACTITIONER

## 2023-08-23 PROCEDURE — 36415 COLL VENOUS BLD VENIPUNCTURE: CPT | Performed by: NURSE PRACTITIONER

## 2023-08-23 PROCEDURE — 81003 URINALYSIS AUTO W/O SCOPE: CPT | Performed by: NURSE PRACTITIONER

## 2023-08-23 PROCEDURE — 84153 ASSAY OF PSA TOTAL: CPT | Performed by: NURSE PRACTITIONER

## 2023-08-23 PROCEDURE — 80061 LIPID PANEL: CPT | Performed by: NURSE PRACTITIONER

## 2023-08-24 ENCOUNTER — TELEPHONE (OUTPATIENT)
Dept: FAMILY MEDICINE | Facility: CLINIC | Age: 63
End: 2023-08-24

## 2023-08-24 NOTE — TELEPHONE ENCOUNTER
----- Message from MARCOS Duncan sent at 8/24/2023  7:48 AM CDT -----  A1c down to 7.1, goal is below 7.0; cholesterol a little worse than previous-make sure he is taking his medication daily; otherwise labs look okay, he needs follow-up as planned next month for recheck of blood pressure and further review of labs

## 2023-08-24 NOTE — TELEPHONE ENCOUNTER
Patient notified and verbalized understanding.      Patient says he is taking Atorvastatin daily. Says PCP mentioned might need to increase. Patient advised that I would call him back to notify him if Atorvastatin is increased.

## 2023-08-24 NOTE — PROGRESS NOTES
A1c down to 7.1, goal is below 7.0; cholesterol a little worse than previous-make sure he is taking his medication daily; otherwise labs look okay, he needs follow-up as planned next month for recheck of blood pressure and further review of labs

## 2023-08-25 NOTE — TELEPHONE ENCOUNTER
Patient notified and verbalized understanding.     Patient now saying he has not been taking Atorvastatin bc it gives him a rash. Patient says he was confused about it bc he has sveral bottles of it around the house, but now remembers after his girlfriend reminded him that he has not been taking it. Patient advised to keep upcoming appt to discuss with PCP.

## 2023-08-28 ENCOUNTER — TELEPHONE (OUTPATIENT)
Dept: FAMILY MEDICINE | Facility: CLINIC | Age: 63
End: 2023-08-28

## 2023-08-28 VITALS — SYSTOLIC BLOOD PRESSURE: 157 MMHG | DIASTOLIC BLOOD PRESSURE: 103 MMHG

## 2023-08-28 NOTE — TELEPHONE ENCOUNTER
----- Message from Remy Robles LPN sent at 8/21/2023  1:19 PM CDT -----  Regarding: BP Recheck  Remote BP Recheck

## 2023-08-28 NOTE — TELEPHONE ENCOUNTER
Spoke with patient for remote BP reading. 157/103, patient says he did take his BP medication this morning. Patient advised to keep his upcoming appt to follow up on BP.

## 2024-03-25 ENCOUNTER — TELEPHONE (OUTPATIENT)
Dept: FAMILY MEDICINE | Facility: CLINIC | Age: 64
End: 2024-03-25
Payer: MEDICAID

## 2024-06-06 ENCOUNTER — OFFICE VISIT (OUTPATIENT)
Dept: FAMILY MEDICINE | Facility: CLINIC | Age: 64
End: 2024-06-06
Payer: MEDICAID

## 2024-06-06 VITALS
BODY MASS INDEX: 27.22 KG/M2 | OXYGEN SATURATION: 98 % | SYSTOLIC BLOOD PRESSURE: 126 MMHG | DIASTOLIC BLOOD PRESSURE: 86 MMHG | TEMPERATURE: 98 F | HEART RATE: 89 BPM | RESPIRATION RATE: 18 BRPM | WEIGHT: 201 LBS | HEIGHT: 72 IN

## 2024-06-06 DIAGNOSIS — Z79.4 TYPE 2 DIABETES MELLITUS WITH DIABETIC POLYNEUROPATHY, WITH LONG-TERM CURRENT USE OF INSULIN: ICD-10-CM

## 2024-06-06 DIAGNOSIS — E78.2 MIXED HYPERLIPIDEMIA: ICD-10-CM

## 2024-06-06 DIAGNOSIS — E11.65 TYPE 2 DIABETES MELLITUS WITH HYPERGLYCEMIA, WITHOUT LONG-TERM CURRENT USE OF INSULIN: Primary | ICD-10-CM

## 2024-06-06 DIAGNOSIS — E11.42 TYPE 2 DIABETES MELLITUS WITH DIABETIC POLYNEUROPATHY, WITH LONG-TERM CURRENT USE OF INSULIN: ICD-10-CM

## 2024-06-06 PROCEDURE — 99213 OFFICE O/P EST LOW 20 MIN: CPT | Mod: S$PBB,,, | Performed by: FAMILY MEDICINE

## 2024-06-06 PROCEDURE — 99999 PR PBB SHADOW E&M-EST. PATIENT-LVL IV: CPT | Mod: PBBFAC,,, | Performed by: FAMILY MEDICINE

## 2024-06-06 PROCEDURE — 1159F MED LIST DOCD IN RCRD: CPT | Mod: CPTII,,, | Performed by: FAMILY MEDICINE

## 2024-06-06 PROCEDURE — 3074F SYST BP LT 130 MM HG: CPT | Mod: CPTII,,, | Performed by: FAMILY MEDICINE

## 2024-06-06 PROCEDURE — 3008F BODY MASS INDEX DOCD: CPT | Mod: CPTII,,, | Performed by: FAMILY MEDICINE

## 2024-06-06 PROCEDURE — 3079F DIAST BP 80-89 MM HG: CPT | Mod: CPTII,,, | Performed by: FAMILY MEDICINE

## 2024-06-06 PROCEDURE — 99214 OFFICE O/P EST MOD 30 MIN: CPT | Mod: PBBFAC,PN | Performed by: FAMILY MEDICINE

## 2024-06-06 PROCEDURE — 3046F HEMOGLOBIN A1C LEVEL >9.0%: CPT | Mod: CPTII,,, | Performed by: FAMILY MEDICINE

## 2024-06-06 RX ORDER — FLUTICASONE PROPIONATE 50 MCG
SPRAY, SUSPENSION (ML) NASAL
COMMUNITY

## 2024-06-06 RX ORDER — NYSTATIN 100000 U/G
CREAM TOPICAL
COMMUNITY

## 2024-06-06 RX ORDER — POTASSIUM CHLORIDE 750 MG/1
TABLET, EXTENDED RELEASE ORAL
COMMUNITY

## 2024-06-06 RX ORDER — BLOOD-GLUCOSE SENSOR
1 EACH MISCELLANEOUS 4 TIMES DAILY
Qty: 1 EACH | Refills: 0 | Status: SHIPPED | OUTPATIENT
Start: 2024-06-06 | End: 2024-06-17 | Stop reason: SDUPTHER

## 2024-06-06 RX ORDER — VALSARTAN 160 MG/1
TABLET ORAL
COMMUNITY

## 2024-06-06 RX ORDER — SEMAGLUTIDE 0.68 MG/ML
0.25 INJECTION, SOLUTION SUBCUTANEOUS WEEKLY
COMMUNITY
Start: 2024-05-16 | End: 2024-06-06

## 2024-06-06 RX ORDER — SEMAGLUTIDE 1.34 MG/ML
1 INJECTION, SOLUTION SUBCUTANEOUS
Qty: 3 ML | Refills: 11 | Status: SHIPPED | OUTPATIENT
Start: 2024-06-06 | End: 2024-06-06

## 2024-06-06 RX ORDER — OMEPRAZOLE 40 MG/1
CAPSULE, DELAYED RELEASE ORAL
COMMUNITY

## 2024-06-06 RX ORDER — TRIAMCINOLONE ACETONIDE 1 MG/G
CREAM TOPICAL
COMMUNITY

## 2024-06-06 RX ORDER — SEMAGLUTIDE 1.34 MG/ML
1 INJECTION, SOLUTION SUBCUTANEOUS
Qty: 3 ML | Refills: 11 | Status: SHIPPED | OUTPATIENT
Start: 2024-07-01 | End: 2025-07-01

## 2024-06-06 RX ORDER — CLOTRIMAZOLE AND BETAMETHASONE DIPROPIONATE 10; .64 MG/G; MG/G
CREAM TOPICAL
COMMUNITY

## 2024-06-06 RX ORDER — AMLODIPINE BESYLATE 5 MG/1
5 TABLET ORAL
COMMUNITY
Start: 2024-04-18 | End: 2024-07-17

## 2024-06-06 RX ORDER — CETIRIZINE HYDROCHLORIDE 10 MG/1
TABLET ORAL
COMMUNITY

## 2024-06-06 NOTE — PROGRESS NOTES
Subjective:       Patient ID: Gagan Escobar is a 64 y.o. male.    Chief Complaint: Hypertension, Headache, and Diabetes      Pt is  here to,get established transferring from [facility]. F/u HTN. Headache from uncontrolled htn.  BP Readings from Last 3 Encounters:  06/06/24 : 126/86  08/28/23 : (!) 157/103  08/21/23 : (!) 138/92  Lab Results       Component                Value               Date                       WBC                      6.86                08/23/2023                 HGB                      16.8                08/23/2023                 HCT                      50.5                08/23/2023                 PLT                      336                 08/23/2023                 CHOL                     264 (H)             05/11/2024                 TRIG                     343 (H)             05/11/2024                 HDL                      52                  05/11/2024                 ALT                      28                  08/23/2023                 AST                      25                  08/23/2023                 NA                       137                 08/23/2023                 K                        4.1                 08/23/2023                 CL                       106                 08/23/2023                 CREATININE               1.0                 08/23/2023                 BUN                      14                  08/23/2023                 CO2                      26                  08/23/2023                 TSH                      4.870               08/23/2023                 PSA                      3.9                 08/23/2023                 HGBA1C                   13.2 (H)            05/11/2024  Pt had been off trulicity x 4-5 months and now back on Ozempic x 3 weeks  Lab Results       Component                Value               Date                       CHOL                     264 (H)             05/11/2024                 CHOL                      217 (H)             08/23/2023                 CHOL                     187                 07/25/2022            Lab Results       Component                Value               Date                       HDL                      52                  05/11/2024                 HDL                      49                  08/23/2023                 HDL                      45                  07/25/2022            Lab Results       Component                Value               Date                       LDLCALC                  149 (H)             05/11/2024                 LDLCALC                  146.8               08/23/2023                 LDLCALC                  104.4               07/25/2022            Lab Results       Component                Value               Date                       TRIG                     343 (H)             05/11/2024                 TRIG                     106                 08/23/2023                 TRIG                     188 (H)             07/25/2022              Lab Results       Component                Value               Date                       CHOLHDL                  22.6                08/23/2023                 CHOLHDL                  24.1                07/25/2022                 CHOLHDL                  20.5                01/03/2022                 Wt Readings from Last 4 Encounters:  06/06/24 : 91.2 kg (201 lb)  08/21/23 : 99.2 kg (218 lb 11.2 oz)  05/31/23 : 101.6 kg (224 lb)  04/11/23 : 101.6 kg (224 lb)          Hypertension  This is a chronic problem. The current episode started today. The problem is unchanged. The problem is uncontrolled. Associated symptoms include headaches (improving). Pertinent negatives include no anxiety, blurred vision or chest pain.   Headache   Pertinent negatives include no blurred vision.   Diabetes  Hypoglycemia symptoms include headaches (improving). Pertinent negatives for diabetes include no blurred vision  and no chest pain.       Allergies and Medications:   Review of patient's allergies indicates:   Allergen Reactions    Ace inhibitors Rash    Pcn [penicillins]      Current Outpatient Medications   Medication Sig Dispense Refill    amLODIPine (NORVASC) 5 MG tablet Take 5 mg by mouth.      atorvastatin (LIPITOR) 20 MG tablet Take 1 tablet (20 mg total) by mouth every evening. 90 tablet 1    aspirin (ECOTRIN) 81 MG EC tablet Take 1 tablet (81 mg total) by mouth once daily. (Patient not taking: Reported on 6/6/2024) 90 tablet 1    blood-glucose meter,continuous Misc 1 Device by Misc.(Non-Drug; Combo Route) route 4 (four) times daily. 1 each 0    blood-glucose sensor (DEXCOM G7 SENSOR) Violet 1 Device by Misc.(Non-Drug; Combo Route) route 4 (four) times daily. 1 each 0    blood-glucose sensor Violet 1 Device by Misc.(Non-Drug; Combo Route) route 4 (four) times daily. 3 each 11    cetirizine (ZYRTEC) 10 MG tablet Take by oral route for 30 days. (Patient not taking: Reported on 6/6/2024)      clotrimazole-betamethasone 1-0.05% (LOTRISONE) cream APPLY TO THE AFFECTED AND SURROUNDING AREAS OF SKIN BY TOPICAL ROUTE 2 TIMES PER DAY IN THE MORNING AND EVENING FOR 2 WEEKS (Patient not taking: Reported on 6/6/2024)      fluticasone propionate (FLONASE) 50 mcg/actuation nasal spray  (Patient not taking: Reported on 6/6/2024)      insulin (LANTUS SOLOSTAR U-100 INSULIN) glargine 100 units/mL SubQ pen ADMINISTER 30 UNITS UNDER THE SKIN EVERY EVENING (Patient not taking: Reported on 6/6/2024) 15 mL 5    lancets Misc To check BG 2 times daily, to use with insurance preferred meter (Patient not taking: Reported on 6/6/2024) 100 each 5    metFORMIN (GLUCOPHAGE-XR) 500 MG ER 24hr tablet TAKE 2 TABLETS(1000 MG) BY MOUTH DAILY WITH BREAKFAST (Patient not taking: Reported on 6/6/2024) 180 tablet 1    nystatin (MYCOSTATIN) cream APPLY TO THE AFFECTED AREA(S) BY TOPICAL ROUTE 2 TIMES PER DAY (Patient not taking: Reported on 6/6/2024)       "olmesartan (BENICAR) 40 MG tablet Take 1 tablet (40 mg total) by mouth once daily. (Patient not taking: Reported on 6/6/2024) 90 tablet 1    omeprazole (PRILOSEC) 40 MG capsule Take 1 capsule every day by oral route for 30 days. (Patient not taking: Reported on 6/6/2024)      pen needle, diabetic (BD ULTRA-FINE SHORT PEN NEEDLE) 31 gauge x 5/16" Ndle USE 1 PEN NEEDLE DAILY TO ADMINISTER INSULIN (Patient not taking: Reported on 6/6/2024) 100 each 5    potassium chloride (KLOR-CON) 10 MEQ TbSR Take 1 tablet every day by oral route for 30 days. (Patient not taking: Reported on 6/6/2024)      [START ON 7/1/2024] semaglutide (OZEMPIC) 1 mg/dose (4 mg/3 mL) Inject 1 mg into the skin every 7 days. 3 mL 11    triamcinolone acetonide 0.1% (KENALOG) 0.1 % cream APPLY A THIN LAYER TO THE AFFECTED AREA(S) BY TOPICAL ROUTE 2 TIMES PER DAY (Patient not taking: Reported on 6/6/2024)      valsartan (DIOVAN) 160 MG tablet Take 1 tablet every day by oral route for 30 days. (Patient not taking: Reported on 6/6/2024)       No current facility-administered medications for this visit.       Family History:   Family History   Problem Relation Name Age of Onset    Heart disease Mother      Mental illness Mother      Asthma Father         Social History:   Social History     Socioeconomic History    Marital status:    Tobacco Use    Smoking status: Never    Smokeless tobacco: Current   Substance and Sexual Activity    Alcohol use: Yes    Drug use: No    Sexual activity: Yes     Social Determinants of Health     Stress: No Stress Concern Present (10/9/2020)    [employer] - Occupational Stress Questionnaire     Feeling of Stress : Not at all       Review of Systems   Eyes:  Negative for blurred vision.   Cardiovascular:  Negative for chest pain.   Neurological:  Positive for headaches (improving).       Objective:     Vitals:    06/06/24 1423   BP: 126/86   Pulse: 89   Resp: 18   Temp: 98.2 °F (36.8 °C) "        Physical Exam  Vitals and nursing note reviewed.   Constitutional:       General: He is not in acute distress.     Appearance: He is well-developed. He is not ill-appearing, toxic-appearing or diaphoretic.   HENT:      Head: Normocephalic and atraumatic.      Right Ear: External ear normal.      Left Ear: External ear normal.      Nose: Nose normal.      Mouth/Throat:      Pharynx: No oropharyngeal exudate.   Eyes:      General: No scleral icterus.        Right eye: No discharge.         Left eye: No discharge.      Conjunctiva/sclera: Conjunctivae normal.      Pupils: Pupils are equal, round, and reactive to light.   Neck:      Thyroid: No thyromegaly.      Vascular: No carotid bruit or JVD.      Trachea: No tracheal deviation.   Cardiovascular:      Rate and Rhythm: Normal rate.      Heart sounds: Normal heart sounds. No murmur heard.     No friction rub. No gallop.   Pulmonary:      Effort: Pulmonary effort is normal. No respiratory distress.      Breath sounds: Normal breath sounds. No stridor. No wheezing, rhonchi or rales.   Chest:      Chest wall: No tenderness.   Abdominal:      General: Bowel sounds are normal. There is no distension.      Palpations: Abdomen is soft. There is no mass.      Tenderness: There is no abdominal tenderness. There is no guarding or rebound.      Hernia: No hernia is present.   Genitourinary:     Penis: Normal and circumcised. No phimosis, paraphimosis, hypospadias, erythema, tenderness or discharge.       Testes: Normal. Cremasteric reflex is present.         Right: Mass, tenderness or swelling not present. Right testis is descended. Cremasteric reflex is present.          Left: Mass, tenderness or swelling not present. Left testis is descended. Cremasteric reflex is present.       Prostate: Normal.      Rectum: Normal. Guaiac result negative.   Musculoskeletal:         General: No swelling, tenderness or deformity.      Cervical back: Normal range of motion and neck  "supple. No rigidity or tenderness.      Right lower leg: No edema.   Lymphadenopathy:      Cervical: No cervical adenopathy.   Skin:     General: Skin is warm and dry.      Coloration: Skin is not pale.      Findings: No erythema or rash.   Neurological:      Mental Status: He is alert and oriented to person, place, and time.      Cranial Nerves: No cranial nerve deficit.      Motor: No abnormal muscle tone.      Coordination: Coordination normal.      Deep Tendon Reflexes: Reflexes are normal and symmetric. Reflexes normal.   Psychiatric:         Behavior: Behavior normal.         Thought Content: Thought content normal.         Judgment: Judgment normal.         Assessment:       1. Type 2 diabetes mellitus with hyperglycemia, without long-term current use of insulin    2. Mixed hyperlipidemia    3. Type 2 diabetes mellitus with diabetic polyneuropathy, with long-term current use of insulin        Plan:       Gagan Lobato" was seen today for hypertension, headache and diabetes.    Diagnoses and all orders for this visit:    Type 2 diabetes mellitus with hyperglycemia, without long-term current use of insulin  -     Discontinue: semaglutide (OZEMPIC) 1 mg/dose (4 mg/3 mL); Inject 1 mg into the skin every 7 days.  -     semaglutide (OZEMPIC) 1 mg/dose (4 mg/3 mL); Inject 1 mg into the skin every 7 days.  -     blood-glucose sensor (DEXCOM G7 SENSOR) Violet; 1 Device by Misc.(Non-Drug; Combo Route) route 4 (four) times daily.  -     blood-glucose meter,continuous Misc; 1 Device by Misc.(Non-Drug; Combo Route) route 4 (four) times daily.  -     blood-glucose sensor Violet; 1 Device by Misc.(Non-Drug; Combo Route) route 4 (four) times daily.    Mixed hyperlipidemia    Type 2 diabetes mellitus with diabetic polyneuropathy, with long-term current use of insulin  -     Ambulatory referral/consult to Podiatry; Future  -     blood-glucose sensor (DEXCOM G7 SENSOR) Violet; 1 Device by Misc.(Non-Drug; Combo Route) route 4 (four) " times daily.  -     blood-glucose meter,continuous Misc; 1 Device by Misc.(Non-Drug; Combo Route) route 4 (four) times daily.  -     blood-glucose sensor Violet; 1 Device by Misc.(Non-Drug; Combo Route) route 4 (four) times daily.         No follow-ups on file.

## 2024-06-13 DIAGNOSIS — Z79.4 TYPE 2 DIABETES MELLITUS WITH DIABETIC POLYNEUROPATHY, WITH LONG-TERM CURRENT USE OF INSULIN: ICD-10-CM

## 2024-06-13 DIAGNOSIS — E11.42 TYPE 2 DIABETES MELLITUS WITH DIABETIC POLYNEUROPATHY, WITH LONG-TERM CURRENT USE OF INSULIN: ICD-10-CM

## 2024-06-13 DIAGNOSIS — E11.65 TYPE 2 DIABETES MELLITUS WITH HYPERGLYCEMIA, WITHOUT LONG-TERM CURRENT USE OF INSULIN: Primary | ICD-10-CM

## 2024-06-13 DIAGNOSIS — E11.649 HYPOGLYCEMIA ASSOCIATED WITH TYPE 2 DIABETES MELLITUS: ICD-10-CM

## 2024-06-17 RX ORDER — BLOOD-GLUCOSE SENSOR
EACH MISCELLANEOUS
OUTPATIENT
Start: 2024-06-17 | End: 2025-06-17

## 2024-06-17 RX ORDER — BLOOD-GLUCOSE SENSOR
1 EACH MISCELLANEOUS 4 TIMES DAILY
Qty: 1 EACH | Refills: 0 | Status: SHIPPED | OUTPATIENT
Start: 2024-06-17 | End: 2025-06-17

## 2024-06-17 NOTE — TELEPHONE ENCOUNTER
Both prescription PA's were denied. You told me to add diagnose code of Hypoglycemia to see if his insurance would approve the dexcom G7.

## 2024-06-28 ENCOUNTER — TELEPHONE (OUTPATIENT)
Dept: FAMILY MEDICINE | Facility: CLINIC | Age: 64
End: 2024-06-28
Payer: MEDICAID

## 2024-07-10 ENCOUNTER — TELEPHONE (OUTPATIENT)
Dept: FAMILY MEDICINE | Facility: CLINIC | Age: 64
End: 2024-07-10
Payer: MEDICAID

## 2024-07-19 DIAGNOSIS — E11.65 TYPE 2 DIABETES MELLITUS WITH HYPERGLYCEMIA, WITHOUT LONG-TERM CURRENT USE OF INSULIN: Primary | ICD-10-CM

## 2024-07-19 RX ORDER — BLOOD-GLUCOSE CONTROL, NORMAL
2 EACH MISCELLANEOUS 2 TIMES DAILY
Qty: 200 EACH | Refills: 1 | Status: SHIPPED | OUTPATIENT
Start: 2024-07-19 | End: 2025-07-19

## 2024-07-19 RX ORDER — INSULIN PUMP SYRINGE, 3 ML
EACH MISCELLANEOUS
Qty: 1 EACH | Refills: 0 | Status: SHIPPED | OUTPATIENT
Start: 2024-07-19 | End: 2025-07-19

## 2024-07-23 ENCOUNTER — OFFICE VISIT (OUTPATIENT)
Dept: PODIATRY | Facility: CLINIC | Age: 64
End: 2024-07-23
Payer: MEDICAID

## 2024-07-23 VITALS — BODY MASS INDEX: 26.88 KG/M2 | WEIGHT: 198.44 LBS | HEIGHT: 72 IN | OXYGEN SATURATION: 97 %

## 2024-07-23 DIAGNOSIS — E11.9 ENCOUNTER FOR DIABETIC FOOT EXAM: ICD-10-CM

## 2024-07-23 DIAGNOSIS — B35.1 ONYCHOMYCOSIS DUE TO DERMATOPHYTE: ICD-10-CM

## 2024-07-23 DIAGNOSIS — M20.5X1 HALLUX LIMITUS OF RIGHT FOOT: ICD-10-CM

## 2024-07-23 DIAGNOSIS — E11.65 TYPE 2 DIABETES MELLITUS WITH HYPERGLYCEMIA, WITHOUT LONG-TERM CURRENT USE OF INSULIN: Primary | ICD-10-CM

## 2024-07-23 DIAGNOSIS — B35.3 TINEA PEDIS OF BOTH FEET: ICD-10-CM

## 2024-07-23 PROCEDURE — 3008F BODY MASS INDEX DOCD: CPT | Mod: CPTII,,, | Performed by: PODIATRIST

## 2024-07-23 PROCEDURE — 99999 PR PBB SHADOW E&M-EST. PATIENT-LVL IV: CPT | Mod: PBBFAC,,, | Performed by: PODIATRIST

## 2024-07-23 PROCEDURE — 1160F RVW MEDS BY RX/DR IN RCRD: CPT | Mod: CPTII,,, | Performed by: PODIATRIST

## 2024-07-23 PROCEDURE — 99204 OFFICE O/P NEW MOD 45 MIN: CPT | Mod: S$PBB,,, | Performed by: PODIATRIST

## 2024-07-23 PROCEDURE — 4010F ACE/ARB THERAPY RXD/TAKEN: CPT | Mod: CPTII,,, | Performed by: PODIATRIST

## 2024-07-23 PROCEDURE — 1159F MED LIST DOCD IN RCRD: CPT | Mod: CPTII,,, | Performed by: PODIATRIST

## 2024-07-23 PROCEDURE — 3046F HEMOGLOBIN A1C LEVEL >9.0%: CPT | Mod: CPTII,,, | Performed by: PODIATRIST

## 2024-07-23 PROCEDURE — 99214 OFFICE O/P EST MOD 30 MIN: CPT | Mod: PBBFAC,PN | Performed by: PODIATRIST

## 2024-07-23 NOTE — PROGRESS NOTES
[address] [facility] [address]  [place] [address]  Phone: [phone]   [phone]    Patient's PCP:Alonso Bowen MD  Referring Provider: Aaareferral Self    Subjective:      Chief Complaint:: Diabetic Foot Exam    HPI  Gagan Escobar is a 64 y.o. male who presents today for a diabetic foot exam.  Pt has seen Alonso Bowen MD on 6/6/2024 who treats them for their diabetes.  Pt has been a diabetic for 2-3 years.  Taking Ozempic to treat diabetes.    Blood sugar: did not check  Hemoglobin A1C: 13.2        Vitals:    07/23/24 0927   SpO2: 97%   Weight: 90 kg (198 lb 6.6 oz)   Height: 6' (1.829 m)   PainSc: 0-No pain      Shoe Size: 10.5    Past Surgical History:   Procedure Laterality Date    FOOT SURGERY      NECK SURGERY  02/2020     Past Medical History:   Diagnosis Date    Diabetes mellitus type 2 with complications     Hyperlipidemia     Hypertension      Family History   Problem Relation Name Age of Onset    Heart disease Mother      Mental illness Mother      Asthma Father          Social History:   Marital Status:   Alcohol History:  reports current alcohol use.  Tobacco History:  reports that he has never smoked. He uses smokeless tobacco.  Drug History:  reports no history of drug use.    Review of patient's allergies indicates:   Allergen Reactions    Ace inhibitors Rash    Pcn [penicillins]        Current Outpatient Medications   Medication Sig Dispense Refill    aspirin (ECOTRIN) 81 MG EC tablet Take 1 tablet (81 mg total) by mouth once daily. (Patient not taking: Reported on 6/6/2024) 90 tablet 1    atorvastatin (LIPITOR) 20 MG tablet Take 1 tablet (20 mg total) by mouth every evening. 90 tablet 1    blood sugar diagnostic Strp 2 strips by Misc.(Non-Drug; Combo Route) route 2 (two) times a day. 200 strip 1    blood-glucose meter kit Use as instructed 1 each 0    blood-glucose meter,continuous Misc 1 Device by Misc.(Non-Drug; Combo Route) route 4 (four) times daily. 1 each 0  "   blood-glucose sensor (DEXCOM G7 SENSOR) Violet 1 Device by Misc.(Non-Drug; Combo Route) route 4 (four) times daily. 1 each 0    blood-glucose sensor Violet 1 Device by Misc.(Non-Drug; Combo Route) route 4 (four) times daily. 3 each 11    cetirizine (ZYRTEC) 10 MG tablet Take by oral route for 30 days. (Patient not taking: Reported on 6/6/2024)      clotrimazole-betamethasone 1-0.05% (LOTRISONE) cream APPLY TO THE AFFECTED AND SURROUNDING AREAS OF SKIN BY TOPICAL ROUTE 2 TIMES PER DAY IN THE MORNING AND EVENING FOR 2 WEEKS (Patient not taking: Reported on 6/6/2024)      fluticasone propionate (FLONASE) 50 mcg/actuation nasal spray  (Patient not taking: Reported on 6/6/2024)      insulin (LANTUS SOLOSTAR U-100 INSULIN) glargine 100 units/mL SubQ pen ADMINISTER 30 UNITS UNDER THE SKIN EVERY EVENING (Patient not taking: Reported on 6/6/2024) 15 mL 5    lancets 30 gauge Misc 2 lancets by Misc.(Non-Drug; Combo Route) route 2 (two) times a day. 200 each 1    lancets Misc To check BG 2 times daily, to use with insurance preferred meter (Patient not taking: Reported on 6/6/2024) 100 each 5    metFORMIN (GLUCOPHAGE-XR) 500 MG ER 24hr tablet TAKE 2 TABLETS(1000 MG) BY MOUTH DAILY WITH BREAKFAST (Patient not taking: Reported on 6/6/2024) 180 tablet 1    nystatin (MYCOSTATIN) cream APPLY TO THE AFFECTED AREA(S) BY TOPICAL ROUTE 2 TIMES PER DAY (Patient not taking: Reported on 6/6/2024)      olmesartan (BENICAR) 40 MG tablet Take 1 tablet (40 mg total) by mouth once daily. (Patient not taking: Reported on 6/6/2024) 90 tablet 1    omeprazole (PRILOSEC) 40 MG capsule Take 1 capsule every day by oral route for 30 days. (Patient not taking: Reported on 6/6/2024)      pen needle, diabetic (BD ULTRA-FINE SHORT PEN NEEDLE) 31 gauge x 5/16" Ndle USE 1 PEN NEEDLE DAILY TO ADMINISTER INSULIN (Patient not taking: Reported on 6/6/2024) 100 each 5    potassium chloride (KLOR-CON) 10 MEQ TbSR Take 1 tablet every day by oral route for 30 days. " (Patient not taking: Reported on 6/6/2024)      semaglutide (OZEMPIC) 1 mg/dose (4 mg/3 mL) Inject 1 mg into the skin every 7 days. 3 mL 11    triamcinolone acetonide 0.1% (KENALOG) 0.1 % cream APPLY A THIN LAYER TO THE AFFECTED AREA(S) BY TOPICAL ROUTE 2 TIMES PER DAY (Patient not taking: Reported on 6/6/2024)      valsartan (DIOVAN) 160 MG tablet Take 1 tablet every day by oral route for 30 days. (Patient not taking: Reported on 6/6/2024)       No current facility-administered medications for this visit.       Review of Systems   Constitutional:  Negative for chills, fatigue, fever and unexpected weight change.   HENT:  Negative for hearing loss and trouble swallowing.    Eyes:  Negative for photophobia and visual disturbance.   Respiratory:  Negative for cough, shortness of breath and wheezing.    Cardiovascular:  Negative for chest pain, palpitations and leg swelling.   Gastrointestinal:  Negative for abdominal pain and nausea.   Genitourinary:  Negative for dysuria and frequency.   Musculoskeletal:  Negative for arthralgias, back pain, gait problem, joint swelling, myalgias and neck pain.   Skin:  Positive for color change. Negative for rash and wound.   Neurological:  Negative for tremors, seizures, weakness, numbness and headaches.   Hematological:  Does not bruise/bleed easily.   Psychiatric/Behavioral:  Negative for hallucinations.          Objective:        Physical Exam:   Foot Exam    General  General Appearance: appears stated age and healthy   Orientation: alert and oriented to person, place, and time   Affect: appropriate   Gait: unimpaired       Right Foot/Ankle     Inspection and Palpation  Ecchymosis: none  Tenderness: none   Swelling: none   Arch: normal  Hallux limitus: yes  Skin Exam: dry skin, tinea and skin changes; no drainage, no ulcer and no erythema   Fungus Toenails: present    Neurovascular  Dorsalis pedis: 2+  Posterior tibial: 2+  Capillary Refill: 2+  Varicose veins: not  present  Saphenous nerve sensation: normal  Tibial nerve sensation: normal  Superficial peroneal nerve sensation: normal  Deep peroneal nerve sensation: normal  Sural nerve sensation: normal    Edema  Type of edema: non-pitting    Muscle Strength  Ankle dorsiflexion: 5  Ankle plantar flexion: 5  Ankle inversion: 5  Ankle eversion: 5  Great toe extension: 5  Great toe flexion: 5    Range of Motion    Normal right ankle ROM    Tests  Anterior drawer: negative   Talar tilt: negative   PT Tinel's sign: negative    Paresthesia: negative    Left Foot/Ankle      Inspection and Palpation  Ecchymosis: none  Tenderness: none   Swelling: none   Arch: normal  Hallux limitus: yes  Skin Exam: dry skin, tinea and skin changes; no drainage, no ulcer and no erythema   Fungus Toenails: present    Neurovascular  Dorsalis pedis: 2+  Posterior tibial: 2+  Capillary refill: 2+  Varicose veins: not present  Saphenous nerve sensation: normal  Tibial nerve sensation: normal  Superficial peroneal nerve sensation: normal  Deep peroneal nerve sensation: normal  Sural nerve sensation: normal    Edema  Type of edema: non-pitting    Muscle Strength  Ankle dorsiflexion: 5  Ankle plantar flexion: 5  Ankle inversion: 5  Ankle eversion: 5  Great toe extension: 5  Great toe flexion: 5    Range of Motion    Normal left ankle ROM    Tests  Anterior drawer: negative   Talar tilt: negative   PT Tinel's sign: negative  Paresthesia: negative      Physical Exam  Cardiovascular:      Pulses:           Dorsalis pedis pulses are 2+ on the right side and 2+ on the left side.        Posterior tibial pulses are 2+ on the right side and 2+ on the left side.   Feet:      Right foot:      Skin integrity: Dry skin present. No ulcer or erythema.      Toenail Condition: Fungal disease present.     Left foot:      Skin integrity: Dry skin present. No ulcer or erythema.      Toenail Condition: Fungal disease present.        Imaging: none            Assessment:       1.  Type 2 diabetes mellitus with hyperglycemia, without long-term current use of insulin    2. Tinea pedis of both feet    3. Hallux limitus of right foot    4. Encounter for diabetic foot exam    5. Onychomycosis due to dermatophyte      Plan:   Type 2 diabetes mellitus with hyperglycemia, without long-term current use of insulin  -      DIABETES FOOT EXAM    Tinea pedis of both feet    Hallux limitus of right foot    Encounter for diabetic foot exam  -      DIABETES FOOT EXAM    Onychomycosis due to dermatophyte      Follow up if symptoms worsen or fail to improve.    Procedures        Counseled patient on the aspects of diabetes and how it pertains to the feet.  I explained the importance of proper diabetic foot care and how it is essential for the health of their feet.    I discussed the importance of knowing their HGA1c and that the level needs to be as close to 6 as possible.  I discussed the increase complications of high blood sugar including stroke, blindness, heart attack, kidney failure and loss of limb secondary to neuropathy and PVD.    Patient  was made aware of inspecting their feet.  Patient was told to be aware of any breaks in the skin or redness.  With neuropathy, these areas are not recognized early due to the numbness.  I discussed different treatments available to control the symptoms but whcih may not cure the problem.      Shoe inspection. Patient instructed on proper foot hygeine. We discussed wearing proper shoe gear, daily foot inspections, never walking without protective shoe gear, never putting sharp instruments to feet.    Fungal infection of toenails explained. Treatment options including no treatment, periodic debridement, topical medications, oral medications, and removal of the nail were discussed, as well as success rates and risks of recurrence. We agreed on no treatment at this time      Counseling:     I provided patient education verbally regarding:   Patient diagnosis,  treatment options, as well as alternatives, risks, and benefits.     This note was created using Dragon voice recognition software that occasionally misinterpreted phrases or words.

## 2024-07-23 NOTE — PATIENT INSTRUCTIONS
Your Diabetes Foot Care Program    Every day you depend on your feet to keep you moving. But when you have diabetes, your feet need special care. Even a small foot problem can become very serious. So dont take your feet for granted. By working with your diabetes healthcare team, you can learn how to protect your feet and keep them healthy.  Evaluating your feet  An evaluation helps your healthcare provider check the condition of your feet. The evaluation includes a review of your diabetes history and overall health. It may also include a foot exam, X-rays, or other tests. These can help show problems beneath the skin that you cant see or feel.  Medical history  You will be asked about your overall health and any history of foot problems. Youll also discuss your diabetes history, such as whether your blood sugar level has changed over time. It also includes questions about sensations of pain, tingling, pins and needles, or numbness. Your healthcare provider will also want to know if you have high blood pressure and heart disease, or if you smoke. Be sure to mention any medicines (including over-the-counter), supplements, or herbal remedies you take.  Foot exam  A foot exam checks the condition of different parts of your foot. First, your skin and nails are examined for any signs of infection. Blood flow is checked by feeling for the pulses in each foot. You may also have tests to study the nerves in the foot. These include using a small filament (wire) to see how sensitive your feet are. In certain cases, you will be asked to walk a short distance to check for bone, joint, and muscle problems.  Diagnostic tests  If needed, your healthcare provider will suggest certain tests to learn more about your feet. These include:  Doppler tests to measure blood flow in the feet and lower leg.  X-rays, which can show bone or joint problems.  Other imaging tests, such as an MRI (magnetic resonance imaging), bone scan, and CT  (computed tomography) scan. These can help show bone infections.  Other tests, such as vascular tests, which study the blood flow in your feet and legs. You may also have nerve studies to learn how sensitive your feet are.  Creating a foot care program  Based on the evaluation, your healthcare provider will create a foot care program for you. Your program may be as simple as starting a daily self-care routine and changing the types of shoes your wear. It may also involve treating minor foot problems, such as a corn or blister. In some cases, surgery will be needed to treat an infection or mechanical problems, such as hammer toes.  Preventing problems  When you have diabetes, its easier to prevent problems than to treat them later on. So see your healthcare team for regular checkups and foot care. Your healthcare team can also help you learn more about caring for your feet at home. For example, you may be told to avoid walking barefoot. Or you may be told that special footwear is needed to protect your feet.  Have regular checkups  Foot problems can develop quickly. So be sure to follow your healthcare teams schedule for regular checkups. During office visits, take off your shoes and socks as soon as you get in the exam room. Ask your healthcare provider to examine your feet for problems. This will make it easier to find and treat small skin irritations before they get worse. Regular checkups can also help keep track of the blood flow and feeling in your feet. If you have neuropathy (lack of feeling in your feet), you will need to have checkups more often.  Learn about self-care  The more you know about diabetes and your feet, the easier it will be to prevent problems. Members of your healthcare team can teach you how to inspect your feet and teach you to look for warning signs. They can also give you other foot care tips. During office visits, be sure to ask any questions you have.  Date Last Reviewed: 7/1/2016  ©  2164-1017 [employer]. [address] [place] [address], [place] [address]. All rights reserved. This information is not intended as a substitute for professional medical care. Always follow your healthcare professional's instructions.

## 2024-08-27 ENCOUNTER — TELEPHONE (OUTPATIENT)
Dept: FAMILY MEDICINE | Facility: CLINIC | Age: 64
End: 2024-08-27
Payer: MEDICAID

## 2024-08-28 ENCOUNTER — TELEPHONE (OUTPATIENT)
Dept: FAMILY MEDICINE | Facility: CLINIC | Age: 64
End: 2024-08-28
Payer: MEDICAID

## 2024-08-28 NOTE — TELEPHONE ENCOUNTER
----- Message from Aminta Parra sent at 8/26/2024  2:44 PM CDT -----  Contact: Melissa  Girlfriend Melissa calling about pt having issues with ozempic and needs to be seen tomorrow if possible.   [phone]

## 2024-09-10 ENCOUNTER — TELEPHONE (OUTPATIENT)
Dept: FAMILY MEDICINE | Facility: CLINIC | Age: 64
End: 2024-09-10

## 2024-09-10 ENCOUNTER — OFFICE VISIT (OUTPATIENT)
Dept: FAMILY MEDICINE | Facility: CLINIC | Age: 64
End: 2024-09-10
Payer: MEDICAID

## 2024-09-10 VITALS
RESPIRATION RATE: 18 BRPM | SYSTOLIC BLOOD PRESSURE: 130 MMHG | OXYGEN SATURATION: 98 % | BODY MASS INDEX: 26.28 KG/M2 | WEIGHT: 194 LBS | HEIGHT: 72 IN | TEMPERATURE: 98 F | DIASTOLIC BLOOD PRESSURE: 80 MMHG | HEART RATE: 98 BPM

## 2024-09-10 DIAGNOSIS — E11.65 TYPE 2 DIABETES MELLITUS WITH HYPERGLYCEMIA, WITHOUT LONG-TERM CURRENT USE OF INSULIN: ICD-10-CM

## 2024-09-10 PROCEDURE — 99215 OFFICE O/P EST HI 40 MIN: CPT | Mod: PBBFAC,PN | Performed by: FAMILY MEDICINE

## 2024-09-10 PROCEDURE — 4010F ACE/ARB THERAPY RXD/TAKEN: CPT | Mod: CPTII,,, | Performed by: FAMILY MEDICINE

## 2024-09-10 PROCEDURE — 99999 PR PBB SHADOW E&M-EST. PATIENT-LVL V: CPT | Mod: PBBFAC,,, | Performed by: FAMILY MEDICINE

## 2024-09-10 PROCEDURE — 3046F HEMOGLOBIN A1C LEVEL >9.0%: CPT | Mod: CPTII,,, | Performed by: FAMILY MEDICINE

## 2024-09-10 PROCEDURE — 3075F SYST BP GE 130 - 139MM HG: CPT | Mod: CPTII,,, | Performed by: FAMILY MEDICINE

## 2024-09-10 PROCEDURE — 3079F DIAST BP 80-89 MM HG: CPT | Mod: CPTII,,, | Performed by: FAMILY MEDICINE

## 2024-09-10 PROCEDURE — 99213 OFFICE O/P EST LOW 20 MIN: CPT | Mod: S$PBB,,, | Performed by: FAMILY MEDICINE

## 2024-09-10 PROCEDURE — 3008F BODY MASS INDEX DOCD: CPT | Mod: CPTII,,, | Performed by: FAMILY MEDICINE

## 2024-09-10 PROCEDURE — 1159F MED LIST DOCD IN RCRD: CPT | Mod: CPTII,,, | Performed by: FAMILY MEDICINE

## 2024-09-10 RX ORDER — INSULIN GLARGINE 100 [IU]/ML
20 INJECTION, SOLUTION SUBCUTANEOUS NIGHTLY
Qty: 18 ML | Refills: 3 | Status: SHIPPED | OUTPATIENT
Start: 2024-09-10 | End: 2025-09-10

## 2024-09-10 RX ORDER — AMLODIPINE BESYLATE 5 MG/1
5 TABLET ORAL EVERY MORNING
COMMUNITY
Start: 2024-08-17

## 2024-09-10 RX ORDER — ASPIRIN 81 MG/1
81 TABLET ORAL DAILY
Qty: 90 TABLET | Refills: 1 | Status: SHIPPED | OUTPATIENT
Start: 2024-09-10

## 2024-09-10 NOTE — PATIENT INSTRUCTIONS
Return to clinic with glucose log book check blood sugars twice a day for the next 2 weeks none right over upper lobe couple doors down same building

## 2024-09-10 NOTE — PROGRESS NOTES
Subjective:       Patient ID: Gagan Escobar is a 64 y.o. male.    Chief Complaint: Diabetes (Ozempic side effects)      Is here for follow-up on Ozempic.  Lab Results       Component                Value               Date                       WBC                      6.86                08/23/2023                 HGB                      16.8                08/23/2023                 HCT                      50.5                08/23/2023                 PLT                      336                 08/23/2023                 CHOL                     264 (H)             05/11/2024                 TRIG                     343 (H)             05/11/2024                 HDL                      52                  05/11/2024                 ALT                      28                  08/23/2023                 AST                      25                  08/23/2023                 NA                       137                 08/23/2023                 K                        4.1                 08/23/2023                 CL                       106                 08/23/2023                 CREATININE               1.0                 08/23/2023                 BUN                      14                  08/23/2023                 CO2                      26                  08/23/2023                 TSH                      4.870               08/23/2023                 PSA                      3.9                 08/23/2023                 HGBA1C                   13.2 (H)            05/11/2024            Wt Readings from Last 3 Encounters:  09/10/24 : 88 kg (194 lb)  07/23/24 : 90 kg (198 lb 6.6 oz)  06/06/24 : 91.2 kg (201 lb)            Diabetes  He presents for his follow-up diabetic visit. He has type 2 diabetes mellitus. His disease course has been stable. His breakfast blood glucose range is generally >200 mg/dl. His dinner blood glucose range is generally >200 mg/dl.       Allergies and  Medications:   Review of patient's allergies indicates:   Allergen Reactions    Ace inhibitors Rash    Pcn [penicillins]      Current Outpatient Medications   Medication Sig Dispense Refill    amLODIPine (NORVASC) 5 MG tablet Take 5 mg by mouth every morning.      atorvastatin (LIPITOR) 20 MG tablet Take 1 tablet (20 mg total) by mouth every evening. 90 tablet 1    blood sugar diagnostic Strp 2 strips by Misc.(Non-Drug; Combo Route) route 2 (two) times a day. 200 strip 1    blood-glucose meter kit Use as instructed 1 each 0    blood-glucose meter,continuous Misc 1 Device by Misc.(Non-Drug; Combo Route) route 4 (four) times daily. 1 each 0    blood-glucose sensor (DEXCOM G7 SENSOR) Violet 1 Device by Misc.(Non-Drug; Combo Route) route 4 (four) times daily. 1 each 0    blood-glucose sensor Violet 1 Device by Misc.(Non-Drug; Combo Route) route 4 (four) times daily. 3 each 11    lancets 30 gauge Misc 2 lancets by Misc.(Non-Drug; Combo Route) route 2 (two) times a day. 200 each 1    semaglutide (OZEMPIC) 1 mg/dose (4 mg/3 mL) Inject 1 mg into the skin every 7 days. 3 mL 11    aspirin (ECOTRIN) 81 MG EC tablet Take 1 tablet (81 mg total) by mouth once daily. 90 tablet 1    cetirizine (ZYRTEC) 10 MG tablet Take by oral route for 30 days. (Patient not taking: Reported on 6/6/2024)      clotrimazole-betamethasone 1-0.05% (LOTRISONE) cream APPLY TO THE AFFECTED AND SURROUNDING AREAS OF SKIN BY TOPICAL ROUTE 2 TIMES PER DAY IN THE MORNING AND EVENING FOR 2 WEEKS (Patient not taking: Reported on 6/6/2024)      fluticasone propionate (FLONASE) 50 mcg/actuation nasal spray  (Patient not taking: Reported on 6/6/2024)      insulin glargine U-100, Lantus, (LANTUS SOLOSTAR U-100 INSULIN) 100 unit/mL (3 mL) InPn pen Inject 20 Units into the skin every evening. ADMINISTER 30 UNITS UNDER THE SKIN EVERY EVENING 18 mL 3    lancets Mis To check BG 2 times daily, to use with insurance preferred meter (Patient not taking: Reported on 6/6/2024)  "100 each 5    nystatin (MYCOSTATIN) cream APPLY TO THE AFFECTED AREA(S) BY TOPICAL ROUTE 2 TIMES PER DAY (Patient not taking: Reported on 6/6/2024)      olmesartan (BENICAR) 40 MG tablet Take 1 tablet (40 mg total) by mouth once daily. (Patient not taking: Reported on 6/6/2024) 90 tablet 1    omeprazole (PRILOSEC) 40 MG capsule Take 1 capsule every day by oral route for 30 days. (Patient not taking: Reported on 6/6/2024)      pen needle, diabetic (BD ULTRA-FINE SHORT PEN NEEDLE) 31 gauge x 5/16" Ndle USE 1 PEN NEEDLE DAILY TO ADMINISTER INSULIN (Patient not taking: Reported on 6/6/2024) 100 each 5    potassium chloride (KLOR-CON) 10 MEQ TbSR Take 1 tablet every day by oral route for 30 days. (Patient not taking: Reported on 6/6/2024)      triamcinolone acetonide 0.1% (KENALOG) 0.1 % cream APPLY A THIN LAYER TO THE AFFECTED AREA(S) BY TOPICAL ROUTE 2 TIMES PER DAY (Patient not taking: Reported on 6/6/2024)      valsartan (DIOVAN) 160 MG tablet Take 1 tablet every day by oral route for 30 days. (Patient not taking: Reported on 6/6/2024)       No current facility-administered medications for this visit.       Family History:   Family History   Problem Relation Name Age of Onset    Heart disease Mother      Mental illness Mother      Asthma Father         Social History:   Social History     Socioeconomic History    Marital status:    Tobacco Use    Smoking status: Never    Smokeless tobacco: Current   Substance and Sexual Activity    Alcohol use: Yes    Drug use: No    Sexual activity: Yes     Social Determinants of Health     Stress: No Stress Concern Present (10/9/2020)    [employer] [place] [place] [employer] - Occupational Stress Questionnaire     Feeling of Stress : Not at all       Review of Systems    Objective:     Vitals:    09/10/24 0911   BP: 130/80   Pulse: 98   Resp: 18   Temp: 98.3 °F (36.8 °C)        Physical Exam  Vitals and nursing note reviewed.   Constitutional:       Appearance: He is " "well-developed. He is not diaphoretic.   HENT:      Head: Normocephalic.   Eyes:      Conjunctiva/sclera: Conjunctivae normal.      Pupils: Pupils are equal, round, and reactive to light.   Cardiovascular:      Rate and Rhythm: Normal rate and regular rhythm.      Heart sounds: Normal heart sounds. No murmur heard.     No friction rub. No gallop.   Pulmonary:      Effort: Pulmonary effort is normal. No respiratory distress.      Breath sounds: Normal breath sounds. No stridor. No wheezing, rhonchi or rales.   Chest:      Chest wall: No tenderness.   Skin:     General: Skin is warm and dry.   Psychiatric:         Behavior: Behavior normal.         Thought Content: Thought content normal.         Judgment: Judgment normal.         Assessment:       1. Type 2 diabetes mellitus with hyperglycemia, without long-term current use of insulin patient has A1cs and sugars are uncontrolled because he got off the insulin way after starting Ozempic was instructed to continue both       Plan:       Gagan Lobato" was seen today for diabetes.    Diagnoses and all orders for this visit:    Type 2 diabetes mellitus with hyperglycemia, without long-term current use of insulin  -     insulin glargine U-100, Lantus, (LANTUS SOLOSTAR U-100 INSULIN) 100 unit/mL (3 mL) InPn pen; Inject 20 Units into the skin every evening. ADMINISTER 30 UNITS UNDER THE SKIN EVERY EVENING  -     aspirin (ECOTRIN) 81 MG EC tablet; Take 1 tablet (81 mg total) by mouth once daily.  -     Hemoglobin A1C; Future         Follow up in about 2 weeks (around 9/24/2024) for follow up DM.    "

## 2024-09-10 NOTE — TELEPHONE ENCOUNTER
Returned call to pharmacy for instructions on Lantus. Per administration instructions it states 30 units under the skin every evening.

## 2024-09-10 NOTE — TELEPHONE ENCOUNTER
----- Message from Ashlee Jason sent at 9/10/2024  9:54 AM CDT -----  [facility] pharmacy is calling about the lancets directions. It has 2 sets and they need clarification   [phone]

## 2024-09-24 ENCOUNTER — LAB VISIT (OUTPATIENT)
Dept: LAB | Facility: HOSPITAL | Age: 64
End: 2024-09-24
Attending: FAMILY MEDICINE
Payer: MEDICAID

## 2024-09-24 ENCOUNTER — OFFICE VISIT (OUTPATIENT)
Dept: FAMILY MEDICINE | Facility: CLINIC | Age: 64
End: 2024-09-24
Payer: MEDICAID

## 2024-09-24 VITALS
OXYGEN SATURATION: 98 % | HEART RATE: 91 BPM | BODY MASS INDEX: 26.95 KG/M2 | RESPIRATION RATE: 18 BRPM | SYSTOLIC BLOOD PRESSURE: 110 MMHG | WEIGHT: 199 LBS | HEIGHT: 72 IN | TEMPERATURE: 98 F | DIASTOLIC BLOOD PRESSURE: 84 MMHG

## 2024-09-24 DIAGNOSIS — E11.65 TYPE 2 DIABETES MELLITUS WITH HYPERGLYCEMIA, WITHOUT LONG-TERM CURRENT USE OF INSULIN: Primary | ICD-10-CM

## 2024-09-24 DIAGNOSIS — E11.65 TYPE 2 DIABETES MELLITUS WITH HYPERGLYCEMIA, WITHOUT LONG-TERM CURRENT USE OF INSULIN: ICD-10-CM

## 2024-09-24 LAB
ALBUMIN/CREAT UR: 5.5 UG/MG (ref 0–30)
CREAT UR-MCNC: 217 MG/DL (ref 23–375)
ESTIMATED AVG GLUCOSE: 217 MG/DL (ref 68–131)
HBA1C MFR BLD: 9.2 % (ref 4–5.6)
MICROALBUMIN UR DL<=1MG/L-MCNC: 12 UG/ML

## 2024-09-24 PROCEDURE — 3008F BODY MASS INDEX DOCD: CPT | Mod: CPTII,,, | Performed by: FAMILY MEDICINE

## 2024-09-24 PROCEDURE — 99213 OFFICE O/P EST LOW 20 MIN: CPT | Mod: S$PBB,,, | Performed by: FAMILY MEDICINE

## 2024-09-24 PROCEDURE — 3046F HEMOGLOBIN A1C LEVEL >9.0%: CPT | Mod: CPTII,,, | Performed by: FAMILY MEDICINE

## 2024-09-24 PROCEDURE — 3079F DIAST BP 80-89 MM HG: CPT | Mod: CPTII,,, | Performed by: FAMILY MEDICINE

## 2024-09-24 PROCEDURE — 82570 ASSAY OF URINE CREATININE: CPT | Performed by: FAMILY MEDICINE

## 2024-09-24 PROCEDURE — 4010F ACE/ARB THERAPY RXD/TAKEN: CPT | Mod: CPTII,,, | Performed by: FAMILY MEDICINE

## 2024-09-24 PROCEDURE — 99215 OFFICE O/P EST HI 40 MIN: CPT | Mod: PBBFAC,PN | Performed by: FAMILY MEDICINE

## 2024-09-24 PROCEDURE — 3066F NEPHROPATHY DOC TX: CPT | Mod: CPTII,,, | Performed by: FAMILY MEDICINE

## 2024-09-24 PROCEDURE — 1159F MED LIST DOCD IN RCRD: CPT | Mod: CPTII,,, | Performed by: FAMILY MEDICINE

## 2024-09-24 PROCEDURE — 3061F NEG MICROALBUMINURIA REV: CPT | Mod: CPTII,,, | Performed by: FAMILY MEDICINE

## 2024-09-24 PROCEDURE — 36415 COLL VENOUS BLD VENIPUNCTURE: CPT | Performed by: FAMILY MEDICINE

## 2024-09-24 PROCEDURE — 99999 PR PBB SHADOW E&M-EST. PATIENT-LVL V: CPT | Mod: PBBFAC,,, | Performed by: FAMILY MEDICINE

## 2024-09-24 PROCEDURE — 82043 UR ALBUMIN QUANTITATIVE: CPT | Performed by: FAMILY MEDICINE

## 2024-09-24 PROCEDURE — 83036 HEMOGLOBIN GLYCOSYLATED A1C: CPT | Performed by: FAMILY MEDICINE

## 2024-09-24 PROCEDURE — 3074F SYST BP LT 130 MM HG: CPT | Mod: CPTII,,, | Performed by: FAMILY MEDICINE

## 2024-09-24 NOTE — PROGRESS NOTES
Subjective:       Patient ID: Gagan Escobar is a 64 y.o. male.    Chief Complaint: Diabetes      Patient here for follow-up on diabetes his sugars since last visit have been below 140 since starting the Ozempic and he maintains Lantus insulin 30 units in the morning and 20 in the evening he desires to get on a single dose daily.  Wt Readings from Last 4 Encounters:  09/24/24 : 90.3 kg (199 lb) on 1 mg Ozempic  09/10/24 : 88 kg (194 lb)  07/23/24 : 90 kg (198 lb 6.6 oz)  06/06/24 : 91.2 kg (201 lb)  Lab Results       Component                Value               Date                       WBC                      6.86                08/23/2023                 HGB                      16.8                08/23/2023                 HCT                      50.5                08/23/2023                 PLT                      336                 08/23/2023                 CHOL                     264 (H)             05/11/2024                 TRIG                     343 (H)             05/11/2024                 HDL                      52                  05/11/2024                 ALT                      28                  08/23/2023                 AST                      25                  08/23/2023                 NA                       137                 08/23/2023                 K                        4.1                 08/23/2023                 CL                       106                 08/23/2023                 CREATININE               1.0                 08/23/2023                 BUN                      14                  08/23/2023                 CO2                      26                  08/23/2023                 TSH                      4.870               08/23/2023                 PSA                      3.9                 08/23/2023                 HGBA1C                   13.2 (H)            05/11/2024                  Diabetes  He presents for his follow-up diabetic  visit. He has type 2 diabetes mellitus. His disease course has been stable. Pertinent negatives for diabetes include no blurred vision, no chest pain, no fatigue, no foot paresthesias, no foot ulcerations, no polydipsia, no polyphagia, no polyuria, no visual change, no weakness and no weight loss.       Allergies and Medications:   Review of patient's allergies indicates:   Allergen Reactions    Ace inhibitors Rash    Pcn [penicillins]      Current Outpatient Medications   Medication Sig Dispense Refill    amLODIPine (NORVASC) 5 MG tablet Take 5 mg by mouth every morning.      aspirin (ECOTRIN) 81 MG EC tablet Take 1 tablet (81 mg total) by mouth once daily. 90 tablet 1    blood sugar diagnostic Strp 2 strips by Misc.(Non-Drug; Combo Route) route 2 (two) times a day. 200 strip 1    blood-glucose meter kit Use as instructed 1 each 0    blood-glucose meter,continuous Misc 1 Device by Misc.(Non-Drug; Combo Route) route 4 (four) times daily. 1 each 0    blood-glucose sensor (DEXCOM G7 SENSOR) Violet 1 Device by Misc.(Non-Drug; Combo Route) route 4 (four) times daily. 1 each 0    blood-glucose sensor Violet 1 Device by Misc.(Non-Drug; Combo Route) route 4 (four) times daily. 3 each 11    cetirizine (ZYRTEC) 10 MG tablet       clotrimazole-betamethasone 1-0.05% (LOTRISONE) cream       fluticasone propionate (FLONASE) 50 mcg/actuation nasal spray       insulin glargine U-100, Lantus, (LANTUS SOLOSTAR U-100 INSULIN) 100 unit/mL (3 mL) InPn pen Inject 20 Units into the skin every evening. ADMINISTER 30 UNITS UNDER THE SKIN EVERY EVENING 18 mL 3    lancets 30 gauge Misc 2 lancets by Misc.(Non-Drug; Combo Route) route 2 (two) times a day. 200 each 1    lancets Misc To check BG 2 times daily, to use with insurance preferred meter 100 each 5    nystatin (MYCOSTATIN) cream       olmesartan (BENICAR) 40 MG tablet Take 1 tablet (40 mg total) by mouth once daily. 90 tablet 1    omeprazole (PRILOSEC) 40 MG capsule       pen needle,  "diabetic (BD ULTRA-FINE SHORT PEN NEEDLE) 31 gauge x 5/16" Ndle USE 1 PEN NEEDLE DAILY TO ADMINISTER INSULIN 100 each 5    potassium chloride (KLOR-CON) 10 MEQ TbSR       semaglutide (OZEMPIC) 1 mg/dose (4 mg/3 mL) Inject 1 mg into the skin every 7 days. 3 mL 11    triamcinolone acetonide 0.1% (KENALOG) 0.1 % cream       valsartan (DIOVAN) 160 MG tablet       atorvastatin (LIPITOR) 20 MG tablet Take 1 tablet (20 mg total) by mouth every evening. 90 tablet 1     No current facility-administered medications for this visit.       Family History:   Family History   Problem Relation Name Age of Onset    Heart disease Mother      Mental illness Mother      Asthma Father         Social History:   Social History     Socioeconomic History    Marital status:    Tobacco Use    Smoking status: Never    Smokeless tobacco: Current   Substance and Sexual Activity    Alcohol use: Yes    Drug use: No    Sexual activity: Yes     Social Determinants of Health     Stress: No Stress Concern Present (10/9/2020)    [employer] [place] [employer] - Occupational Stress Questionnaire     Feeling of Stress : Not at all       Review of Systems   Constitutional:  Negative for fatigue and weight loss.   Eyes:  Negative for blurred vision.   Cardiovascular:  Negative for chest pain.   Endocrine: Negative for polydipsia, polyphagia and polyuria.   Neurological:  Negative for weakness.       Objective:     Vitals:    09/24/24 1037   BP: 110/84   Pulse: 91   Resp: 18   Temp: 98.3 °F (36.8 °C)        Physical Exam  Vitals and nursing note reviewed.   Constitutional:       Appearance: He is well-developed. He is not diaphoretic.   HENT:      Head: Normocephalic.   Eyes:      Conjunctiva/sclera: Conjunctivae normal.      Pupils: Pupils are equal, round, and reactive to light.   Cardiovascular:      Rate and Rhythm: Normal rate and regular rhythm.      Heart sounds: Normal heart sounds. No murmur heard.     No friction rub. No gallop. " "  Pulmonary:      Effort: Pulmonary effort is normal. No respiratory distress.      Breath sounds: Normal breath sounds. No stridor. No wheezing or rales.   Chest:      Chest wall: No tenderness.   Skin:     General: Skin is warm and dry.   Psychiatric:         Behavior: Behavior normal.         Thought Content: Thought content normal.         Judgment: Judgment normal.         Assessment:       1. Type 2 diabetes mellitus with hyperglycemia, without long-term current use of insulin        Plan:       Gagan Lobato" was seen today for diabetes.    Diagnoses and all orders for this visit:    Type 2 diabetes mellitus with hyperglycemia, without long-term current use of insulin  -     Microalbumin/Creatinine Ratio, Urine; Future  -     Cancel: Comprehensive Metabolic Panel; Future         Follow up in about 3 months (around 12/24/2024).    "

## 2024-09-25 ENCOUNTER — TELEPHONE (OUTPATIENT)
Dept: FAMILY MEDICINE | Facility: CLINIC | Age: 64
End: 2024-09-25
Payer: MEDICAID

## 2024-09-25 NOTE — TELEPHONE ENCOUNTER
----- Message from Alonso Bowen MD sent at 9/25/2024  8:02 AM CDT -----  The A1c is elevated as expected.  Let us make the changes in insulin as recommended yesterday and follow-up in 2 weeks with your blood sugar diary.

## 2024-09-25 NOTE — PROGRESS NOTES
The A1c is elevated as expected.  Let us make the changes in insulin as recommended yesterday and follow-up in 2 weeks with your blood sugar diary.

## 2024-09-25 NOTE — TELEPHONE ENCOUNTER
----- Message from Aminta Parra sent at 9/25/2024  2:49 PM CDT -----  Needing results.   [phone]

## 2024-09-30 DIAGNOSIS — E11.65 TYPE 2 DIABETES MELLITUS WITH HYPERGLYCEMIA, WITHOUT LONG-TERM CURRENT USE OF INSULIN: ICD-10-CM

## 2024-10-01 RX ORDER — PEN NEEDLE, DIABETIC 30 GX3/16"
NEEDLE, DISPOSABLE MISCELLANEOUS
Qty: 100 EACH | Refills: 5 | Status: SHIPPED | OUTPATIENT
Start: 2024-10-01

## 2024-10-01 NOTE — TELEPHONE ENCOUNTER
----- Message from Aminta sent at 9/30/2024  9:13 AM CDT -----  Refill Lantus pen needles.   Frankie vicente   [phone]

## 2024-10-17 ENCOUNTER — OFFICE VISIT (OUTPATIENT)
Dept: FAMILY MEDICINE | Facility: CLINIC | Age: 64
End: 2024-10-17
Payer: MEDICAID

## 2024-10-17 ENCOUNTER — TELEPHONE (OUTPATIENT)
Dept: FAMILY MEDICINE | Facility: CLINIC | Age: 64
End: 2024-10-17

## 2024-10-17 VITALS
DIASTOLIC BLOOD PRESSURE: 78 MMHG | RESPIRATION RATE: 18 BRPM | HEART RATE: 101 BPM | BODY MASS INDEX: 26.95 KG/M2 | OXYGEN SATURATION: 98 % | TEMPERATURE: 98 F | SYSTOLIC BLOOD PRESSURE: 126 MMHG | HEIGHT: 72 IN | WEIGHT: 199 LBS

## 2024-10-17 DIAGNOSIS — I10 ESSENTIAL HYPERTENSION: ICD-10-CM

## 2024-10-17 DIAGNOSIS — Z23 IMMUNIZATION DUE: ICD-10-CM

## 2024-10-17 DIAGNOSIS — E78.2 MIXED HYPERLIPIDEMIA: Primary | ICD-10-CM

## 2024-10-17 DIAGNOSIS — E11.65 TYPE 2 DIABETES MELLITUS WITH HYPERGLYCEMIA, WITHOUT LONG-TERM CURRENT USE OF INSULIN: ICD-10-CM

## 2024-10-17 PROCEDURE — 99999PBSHW PR PBB SHADOW TECHNICAL ONLY FILED TO HB: Mod: PBBFAC,,,

## 2024-10-17 PROCEDURE — 99213 OFFICE O/P EST LOW 20 MIN: CPT | Mod: 25,S$PBB,, | Performed by: FAMILY MEDICINE

## 2024-10-17 PROCEDURE — 4010F ACE/ARB THERAPY RXD/TAKEN: CPT | Mod: CPTII,,, | Performed by: FAMILY MEDICINE

## 2024-10-17 PROCEDURE — 3066F NEPHROPATHY DOC TX: CPT | Mod: CPTII,,, | Performed by: FAMILY MEDICINE

## 2024-10-17 PROCEDURE — 90471 IMMUNIZATION ADMIN: CPT | Mod: PBBFAC,PN

## 2024-10-17 PROCEDURE — 3046F HEMOGLOBIN A1C LEVEL >9.0%: CPT | Mod: CPTII,,, | Performed by: FAMILY MEDICINE

## 2024-10-17 PROCEDURE — 99999 PR PBB SHADOW E&M-EST. PATIENT-LVL V: CPT | Mod: PBBFAC,,, | Performed by: FAMILY MEDICINE

## 2024-10-17 PROCEDURE — 90656 IIV3 VACC NO PRSV 0.5 ML IM: CPT | Mod: PBBFAC,PN

## 2024-10-17 PROCEDURE — 3078F DIAST BP <80 MM HG: CPT | Mod: CPTII,,, | Performed by: FAMILY MEDICINE

## 2024-10-17 PROCEDURE — 99215 OFFICE O/P EST HI 40 MIN: CPT | Mod: PBBFAC,PN,25 | Performed by: FAMILY MEDICINE

## 2024-10-17 PROCEDURE — 1159F MED LIST DOCD IN RCRD: CPT | Mod: CPTII,,, | Performed by: FAMILY MEDICINE

## 2024-10-17 PROCEDURE — 3074F SYST BP LT 130 MM HG: CPT | Mod: CPTII,,, | Performed by: FAMILY MEDICINE

## 2024-10-17 PROCEDURE — 3061F NEG MICROALBUMINURIA REV: CPT | Mod: CPTII,,, | Performed by: FAMILY MEDICINE

## 2024-10-17 PROCEDURE — 3008F BODY MASS INDEX DOCD: CPT | Mod: CPTII,,, | Performed by: FAMILY MEDICINE

## 2024-10-17 RX ORDER — INSULIN GLARGINE 100 [IU]/ML
40 INJECTION, SOLUTION SUBCUTANEOUS NIGHTLY
Qty: 36 ML | Refills: 3 | Status: SHIPPED | OUTPATIENT
Start: 2024-10-17 | End: 2025-10-17

## 2024-10-17 RX ADMIN — INFLUENZA VIRUS VACCINE 0.5 ML: 15; 15; 15 SUSPENSION INTRAMUSCULAR at 02:10

## 2024-10-17 NOTE — PROGRESS NOTES
Subjective:       Patient ID: Gagan Escobar is a 64 y.o. male.    Chief Complaint: Blood Sugar Problem      History of Present Illness    CHIEF COMPLAINT:  Mr. Escobar presents for a follow-up on his blood sugars after recent medication changes.    HPI:  Mr. Escobar reports improved blood sugar since his last visit, with levels now within normal range (100-120 mg/dL, never exceeding 140 mg/dL) compared to previous readings over 200 mg/dL. He reduced his insulin dosage from 50 units to 30 units. Mr. Escobar had one hypoglycemic episode while driving but no other occurrences. He believes he may have gained approximately 1 pound recently, with an initial weight in the 230-pound range. He uses finger-stick method for glucose monitoring due to insurance limitations. Mr. Escobar notes occasional inconsistency with his glucose meter readings, sometimes requiring multiple tests for accuracy.    Mr. Escobar denies smoking and thyroid issues.    MEDICATIONS:  Mr. Escobar is on blood pressure medication and cholesterol medication. He takes children's aspirin daily and is on Ozempic. His insulin dosage has been recently adjusted from 30 units to 40 units.    MEDICAL HISTORY:  Mr. Escobar has a history of diabetes, hypertension, and hyperlipidemia. He received a flu vaccine during the COVID-19 pandemic and has also received the COVID-19 vaccine.    TEST RESULTS:  Mr. Escobar's previous A1C was 9.2. His recent CBC and white blood cell count were normal. A previous chemistry panel showed good results, with a low anion gap. His glucose level was previously 135.    SOCIAL HISTORY:  Mr. Escobar works as a  for 45 years. No information is provided regarding alcohol consumption or illicit drug use.      ROS:  Neurological: -tremors          Allergies and Medications:   Review of patient's allergies indicates:   Allergen Reactions    Ace inhibitors Rash    Pcn [penicillins]      Current Outpatient Medications  "  Medication Sig Dispense Refill    amLODIPine (NORVASC) 5 MG tablet Take 5 mg by mouth every morning.      aspirin (ECOTRIN) 81 MG EC tablet Take 1 tablet (81 mg total) by mouth once daily. 90 tablet 1    blood sugar diagnostic Strp 2 strips by Misc.(Non-Drug; Combo Route) route 2 (two) times a day. 200 strip 1    blood-glucose meter kit Use as instructed 1 each 0    blood-glucose meter,continuous Misc 1 Device by Misc.(Non-Drug; Combo Route) route 4 (four) times daily. 1 each 0    blood-glucose sensor (DEXCOM G7 SENSOR) Violet 1 Device by Misc.(Non-Drug; Combo Route) route 4 (four) times daily. 1 each 0    blood-glucose sensor Violet 1 Device by Misc.(Non-Drug; Combo Route) route 4 (four) times daily. 3 each 11    cetirizine (ZYRTEC) 10 MG tablet       clotrimazole-betamethasone 1-0.05% (LOTRISONE) cream       fluticasone propionate (FLONASE) 50 mcg/actuation nasal spray       lancets 30 gauge Misc 2 lancets by Misc.(Non-Drug; Combo Route) route 2 (two) times a day. 200 each 1    lancets Misc To check BG 2 times daily, to use with insurance preferred meter 100 each 5    nystatin (MYCOSTATIN) cream       olmesartan (BENICAR) 40 MG tablet Take 1 tablet (40 mg total) by mouth once daily. 90 tablet 1    omeprazole (PRILOSEC) 40 MG capsule       pen needle, diabetic (BD ULTRA-FINE SHORT PEN NEEDLE) 31 gauge x 5/16" Ndle USE 1 PEN NEEDLE DAILY TO ADMINISTER INSULIN 100 each 5    potassium chloride (KLOR-CON) 10 MEQ TbSR       semaglutide (OZEMPIC) 1 mg/dose (4 mg/3 mL) Inject 1 mg into the skin every 7 days. 3 mL 11    triamcinolone acetonide 0.1% (KENALOG) 0.1 % cream       valsartan (DIOVAN) 160 MG tablet       atorvastatin (LIPITOR) 20 MG tablet Take 1 tablet (20 mg total) by mouth every evening. 90 tablet 1    insulin glargine U-100, Lantus, (LANTUS SOLOSTAR U-100 INSULIN) 100 unit/mL (3 mL) InPn pen Inject 40 Units into the skin every evening. ADMINISTER 30 UNITS UNDER THE SKIN EVERY EVENING 36 mL 3     No current " facility-administered medications for this visit.       Family History:   Family History   Problem Relation Name Age of Onset    Heart disease Mother      Mental illness Mother      Asthma Father         Social History:   Social History     Socioeconomic History    Marital status:    Tobacco Use    Smoking status: Never    Smokeless tobacco: Current   Substance and Sexual Activity    Alcohol use: Yes    Drug use: No    Sexual activity: Yes     Social Drivers of Health     Stress: No Stress Concern Present (10/9/2020)    [facility] of Occupational Health - Occupational Stress Questionnaire     Feeling of Stress : Not at all           Objective:     Vitals:    10/17/24 1330   BP: 126/78   Pulse: 101   Resp: 18   Temp: 97.7 °F (36.5 °C)        Physical Exam    Vitals: Weight: 199 lbs.  General: No acute distress. Well-developed. Well-nourished.  Eyes: EOMI. Sclerae anicteric.  HENT: Normocephalic. Atraumatic. Nares patent. Moist oral mucosa.  Cardiovascular: Regular rate. Regular rhythm. No murmurs. No rubs. No gallops. Normal S1, S2. Regular heart rhythm and rate, no murmurs, gallops, or rubs. Good capillary refill.  Respiratory: Normal respiratory effort. Clear to auscultation bilaterally. No rales. No rhonchi. No wheezing.  Musculoskeletal: No  obvious deformity.  Extremities: No lower extremity edema. Peripheral edema.  Neurological: Alert & oriented x3. No slurred speech. Normal gait. Alert and oriented x4.  Psychiatric: Normal mood. Normal affect. Good insight. Good judgment.  Skin: Warm. Dry. No rash.            Assessment:       1. Mixed hyperlipidemia    2. Type 2 diabetes mellitus with hyperglycemia, without long-term current use of insulin    3. Essential hypertension    4. Immunization due        Plan:       Assessment & Plan    Assessed patient's blood sugar control after adding Ozempic and increasing insulin to 50 units  Noted improvement in blood sugar, now running between 100-120 and not  "exceeding 140  Recommend reducing insulin dose from 50 to 40 units as a compromise, with further adjustments based on blood sugar  Evaluated recent weight changes, noting stability despite initial gain  Reviewed thyroid function, determining no current thyroid issues  Assessed recent lab work, including normal white count, CBC, and chemistry panel  Noted previous A1C of 9.2, planning to recheck in 3 months  Performed physical exam, including lung and heart auscultation  Considered pneumococcal vaccination but decided to defer due to age-related insurance coverage concerns    DIABETES:  - Educated patient on appropriate blood sugar, advising that below 80 is too low.  - Informed patient about the need to wait 3 months before rechecking A1C.  - Mr. Escobar to continue monitoring blood sugar.  - Decreased insulin from 50 units to 40 units.  - Continued Ozempic at current dose.  - Ordered A1C, lipid panel, and hypertension labs to be done in 3 months (after the holidays).    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    HYPERTENSION:  - Continued blood pressure medication at current dose.    HYPERLIPIDEMIA:  - Continued cholesterol medication at current dose.    CARDIOVASCULAR HEALTH:  - Continued daily children's aspirin.    FLU VACCINATION:  - Administered flu vaccine in office.    PNEUMOCOCCAL VACCINATION:  - Check with pharmacy regarding insurance coverage for pneumococcal vaccine for patients over 60.    FOLLOW UP:  - Follow up in 6 months.        Gagan Lobato" was seen today for blood sugar problem.    Diagnoses and all orders for this visit:    Mixed hyperlipidemia    Type 2 diabetes mellitus with hyperglycemia, without long-term current use of insulin  -     insulin glargine U-100, Lantus, (LANTUS SOLOSTAR U-100 INSULIN) 100 unit/mL (3 mL) InPn pen; Inject 40 Units into " the skin every evening. ADMINISTER 30 UNITS UNDER THE SKIN EVERY EVENING  -     POCT Glucose, Hand-Held Device; Future  -     Hemoglobin A1C; Future  -     Comprehensive Metabolic Panel; Future  -     Lipid Panel; Future    Essential hypertension  -     Comprehensive Metabolic Panel; Future  -     Lipid Panel; Future    Immunization due  -     influenza (Flulaval, Fluzone, Fluarix) 45 mcg/0.5 mL IM vaccine (> or = 6 mo) 0.5 mL         Follow up in about 6 months (around 4/17/2025).  This note was generated with the assistance of ambient listening technology. Verbal consent was obtained by the patient and accompanying visitor(s) for the recording of patient appointment to facilitate this note. I attest to having reviewed and edited the generated note for accuracy, though some syntax or spelling errors may persist. Please contact the author of this note for any clarification.

## 2024-10-17 NOTE — PROGRESS NOTES
"Subjective:       Patient ID: Gagan Escobar is a 64 y.o. male.    Chief Complaint: Blood Sugar Problem      History of Present Illness               Allergies and Medications:   Review of patient's allergies indicates:   Allergen Reactions    Ace inhibitors Rash    Pcn [penicillins]      Current Outpatient Medications   Medication Sig Dispense Refill    amLODIPine (NORVASC) 5 MG tablet Take 5 mg by mouth every morning.      aspirin (ECOTRIN) 81 MG EC tablet Take 1 tablet (81 mg total) by mouth once daily. 90 tablet 1    blood sugar diagnostic Strp 2 strips by Misc.(Non-Drug; Combo Route) route 2 (two) times a day. 200 strip 1    blood-glucose meter kit Use as instructed 1 each 0    blood-glucose meter,continuous Misc 1 Device by Misc.(Non-Drug; Combo Route) route 4 (four) times daily. 1 each 0    blood-glucose sensor (DEXCOM G7 SENSOR) Violet 1 Device by Misc.(Non-Drug; Combo Route) route 4 (four) times daily. 1 each 0    blood-glucose sensor Violet 1 Device by Misc.(Non-Drug; Combo Route) route 4 (four) times daily. 3 each 11    cetirizine (ZYRTEC) 10 MG tablet       clotrimazole-betamethasone 1-0.05% (LOTRISONE) cream       fluticasone propionate (FLONASE) 50 mcg/actuation nasal spray       lancets 30 gauge Misc 2 lancets by Misc.(Non-Drug; Combo Route) route 2 (two) times a day. 200 each 1    lancets Misc To check BG 2 times daily, to use with insurance preferred meter 100 each 5    nystatin (MYCOSTATIN) cream       olmesartan (BENICAR) 40 MG tablet Take 1 tablet (40 mg total) by mouth once daily. 90 tablet 1    omeprazole (PRILOSEC) 40 MG capsule       pen needle, diabetic (BD ULTRA-FINE SHORT PEN NEEDLE) 31 gauge x 5/16" Ndle USE 1 PEN NEEDLE DAILY TO ADMINISTER INSULIN 100 each 5    potassium chloride (KLOR-CON) 10 MEQ TbSR       semaglutide (OZEMPIC) 1 mg/dose (4 mg/3 mL) Inject 1 mg into the skin every 7 days. 3 mL 11    triamcinolone acetonide 0.1% (KENALOG) 0.1 % cream       valsartan (DIOVAN) 160 MG " "tablet       atorvastatin (LIPITOR) 20 MG tablet Take 1 tablet (20 mg total) by mouth every evening. 90 tablet 1    insulin glargine U-100, Lantus, (LANTUS SOLOSTAR U-100 INSULIN) 100 unit/mL (3 mL) InPn pen Inject 40 Units into the skin every evening. ADMINISTER 30 UNITS UNDER THE SKIN EVERY EVENING 36 mL 3     No current facility-administered medications for this visit.       Family History:   Family History   Problem Relation Name Age of Onset    Heart disease Mother      Mental illness Mother      Asthma Father         Social History:   Social History     Socioeconomic History    Marital status:    Tobacco Use    Smoking status: Never    Smokeless tobacco: Current   Substance and Sexual Activity    Alcohol use: Yes    Drug use: No    Sexual activity: Yes     Social Drivers of Health     Stress: No Stress Concern Present (10/9/2020)    [employer] [place] of Occupational Health - Occupational Stress Questionnaire     Feeling of Stress : Not at all   Sugars have been consistently below 140 since last visit.  He was on 50 units of insulin but his new prescription came back with 30 units  Wt Readings from Last 5 Encounters:   10/17/24 90.3 kg (199 lb)   09/24/24 90.3 kg (199 lb)   09/10/24 88 kg (194 lb)   07/23/24 90 kg (198 lb 6.6 oz)   06/06/24 91.2 kg (201 lb)           Objective:     Vitals:    10/17/24 1330   BP: 126/78   Pulse: 101   Resp: 18   Temp: 97.7 °F (36.5 °C)        Physical Exam                 Assessment:       1. Mixed hyperlipidemia    2. Type 2 diabetes mellitus with hyperglycemia, without long-term current use of insulin    3. Essential hypertension        Plan:       Assessment & Plan             Gagan Lobato" was seen today for blood sugar problem.    Diagnoses and all orders for this visit:    Mixed hyperlipidemia    Type 2 diabetes mellitus with hyperglycemia, without long-term current use of insulin  -     insulin glargine U-100, Lantus, (LANTUS SOLOSTAR U-100 INSULIN) 100 unit/mL " (3 mL) InPn pen; Inject 40 Units into the skin every evening. ADMINISTER 30 UNITS UNDER THE SKIN EVERY EVENING  -     POCT Glucose, Hand-Held Device; Future  -     Hemoglobin A1C; Future  -     Comprehensive Metabolic Panel; Future  -     Lipid Panel; Future    Essential hypertension  -     Comprehensive Metabolic Panel; Future  -     Lipid Panel; Future         Follow up in about 6 months (around 4/17/2025).  This note was generated with the assistance of ambient listening technology. Verbal consent was obtained by the patient and accompanying visitor(s) for the recording of patient appointment to facilitate this note. I attest to having reviewed and edited the generated note for accuracy, though some syntax or spelling errors may persist. Please contact the author of this note for any clarification.

## 2024-10-17 NOTE — PROGRESS NOTES
Subjective:       Patient ID: Gagan Escobar is a 64 y.o. male.    Chief Complaint: Blood Sugar Problem      History of Present Illness    CHIEF COMPLAINT:  Mr. Escobar presents for a follow-up on his blood sugars after recent medication changes.    HPI:  Mr. Escobar reports improved blood sugar since his last visit, with levels now within normal range (100-120 mg/dL, never exceeding 140 mg/dL) compared to previous readings over 200 mg/dL. He reduced his insulin dosage from 50 units to 30 units. Mr. Escobar had one hypoglycemic episode while driving but no other occurrences. He believes he may have gained approximately 1 pound recently, with an initial weight in the 230-pound range. He uses finger-stick method for glucose monitoring due to insurance limitations. Mr. Escobar notes occasional inconsistency with his glucose meter readings, sometimes requiring multiple tests for accuracy.    Mr. Escobar denies smoking and thyroid issues.    MEDICATIONS:  Mr. Escobar is on blood pressure medication and cholesterol medication. He takes children's aspirin daily and is on Ozempic. His insulin dosage has been recently adjusted from 30 units to 40 units.    MEDICAL HISTORY:  Mr. Escobar has a history of diabetes, hypertension, and hyperlipidemia. He received a flu vaccine during the COVID-19 pandemic and has also received the COVID-19 vaccine.    TEST RESULTS:  Mr. Escobar's previous A1C was 9.2. His recent CBC and white blood cell count were normal. A previous chemistry panel showed good results, with a low anion gap. His glucose level was previously 135.    SOCIAL HISTORY:  Mr. Escobar works as a  for 45 years. No information is provided regarding alcohol consumption or illicit drug use.      ROS:  Neurological: -tremors          Allergies and Medications:   Review of patient's allergies indicates:   Allergen Reactions    Ace inhibitors Rash    Pcn [penicillins]      Current Outpatient Medications  "  Medication Sig Dispense Refill    amLODIPine (NORVASC) 5 MG tablet Take 5 mg by mouth every morning.      aspirin (ECOTRIN) 81 MG EC tablet Take 1 tablet (81 mg total) by mouth once daily. 90 tablet 1    blood sugar diagnostic Strp 2 strips by Misc.(Non-Drug; Combo Route) route 2 (two) times a day. 200 strip 1    blood-glucose meter kit Use as instructed 1 each 0    blood-glucose meter,continuous Misc 1 Device by Misc.(Non-Drug; Combo Route) route 4 (four) times daily. 1 each 0    blood-glucose sensor (DEXCOM G7 SENSOR) Violet 1 Device by Misc.(Non-Drug; Combo Route) route 4 (four) times daily. 1 each 0    blood-glucose sensor Violet 1 Device by Misc.(Non-Drug; Combo Route) route 4 (four) times daily. 3 each 11    cetirizine (ZYRTEC) 10 MG tablet       clotrimazole-betamethasone 1-0.05% (LOTRISONE) cream       fluticasone propionate (FLONASE) 50 mcg/actuation nasal spray       lancets 30 gauge Misc 2 lancets by Misc.(Non-Drug; Combo Route) route 2 (two) times a day. 200 each 1    lancets Misc To check BG 2 times daily, to use with insurance preferred meter 100 each 5    nystatin (MYCOSTATIN) cream       olmesartan (BENICAR) 40 MG tablet Take 1 tablet (40 mg total) by mouth once daily. 90 tablet 1    omeprazole (PRILOSEC) 40 MG capsule       pen needle, diabetic (BD ULTRA-FINE SHORT PEN NEEDLE) 31 gauge x 5/16" Ndle USE 1 PEN NEEDLE DAILY TO ADMINISTER INSULIN 100 each 5    potassium chloride (KLOR-CON) 10 MEQ TbSR       semaglutide (OZEMPIC) 1 mg/dose (4 mg/3 mL) Inject 1 mg into the skin every 7 days. 3 mL 11    triamcinolone acetonide 0.1% (KENALOG) 0.1 % cream       valsartan (DIOVAN) 160 MG tablet       atorvastatin (LIPITOR) 20 MG tablet Take 1 tablet (20 mg total) by mouth every evening. 90 tablet 1    insulin glargine U-100, Lantus, (LANTUS SOLOSTAR U-100 INSULIN) 100 unit/mL (3 mL) InPn pen Inject 40 Units into the skin every evening. ADMINISTER 30 UNITS UNDER THE SKIN EVERY EVENING 36 mL 3     No current " facility-administered medications for this visit.       Family History:   Family History   Problem Relation Name Age of Onset    Heart disease Mother      Mental illness Mother      Asthma Father         Social History:   Social History     Socioeconomic History    Marital status:    Tobacco Use    Smoking status: Never    Smokeless tobacco: Current   Substance and Sexual Activity    Alcohol use: Yes    Drug use: No    Sexual activity: Yes     Social Drivers of Health     Stress: No Stress Concern Present (10/9/2020)    [facility] of Occupational Health - Occupational Stress Questionnaire     Feeling of Stress : Not at all           Objective:     Vitals:    10/17/24 1330   BP: 126/78   Pulse: 101   Resp: 18   Temp: 97.7 °F (36.5 °C)        Physical Exam    Vitals: Weight: 199 lbs.  General: No acute distress. Well-developed. Well-nourished.  Eyes: EOMI. Sclerae anicteric.  HENT: Normocephalic. Atraumatic. Nares patent. Moist oral mucosa.  Cardiovascular: Regular rate. Regular rhythm. No murmurs. No rubs. No gallops. Normal S1, S2. Regular heart rhythm and rate, no murmurs, gallops, or rubs. Good capillary refill.  Respiratory: Normal respiratory effort. Clear to auscultation bilaterally. No rales. No rhonchi. No wheezing.  Musculoskeletal: No  obvious deformity.  Extremities: No lower extremity edema. Peripheral edema.  Neurological: Alert & oriented x3. No slurred speech. Normal gait. Alert and oriented x4.  Psychiatric: Normal mood. Normal affect. Good insight. Good judgment.  Skin: Warm. Dry. No rash.            Assessment:       1. Mixed hyperlipidemia    2. Type 2 diabetes mellitus with hyperglycemia, without long-term current use of insulin    3. Essential hypertension    4. Immunization due        Plan:       Assessment & Plan    Assessed patient's blood sugar control after adding Ozempic and increasing insulin to 50 units  Noted improvement in blood sugar, now running between 100-120 and not  "exceeding 140  Recommend reducing insulin dose from 50 to 40 units as a compromise, with further adjustments based on blood sugar  Evaluated recent weight changes, noting stability despite initial gain  Reviewed thyroid function, determining no current thyroid issues  Assessed recent lab work, including normal white count, CBC, and chemistry panel  Noted previous A1C of 9.2, planning to recheck in 3 months  Performed physical exam, including lung and heart auscultation  Considered pneumococcal vaccination but decided to defer due to age-related insurance coverage concerns    DIABETES:  - Educated patient on appropriate blood sugar, advising that below 80 is too low.  - Informed patient about the need to wait 3 months before rechecking A1C.  - Mr. Escobar to continue monitoring blood sugar.  - Decreased insulin from 50 units to 40 units.  - Continued Ozempic at current dose.  - Ordered A1C, lipid panel, and hypertension labs to be done in 3 months (after the holidays).    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    HYPERTENSION:  - Continued blood pressure medication at current dose.    HYPERLIPIDEMIA:  - Continued cholesterol medication at current dose.    CARDIOVASCULAR HEALTH:  - Continued daily children's aspirin.    FLU VACCINATION:  - Administered flu vaccine in office.    PNEUMOCOCCAL VACCINATION:  - Check with pharmacy regarding insurance coverage for pneumococcal vaccine for patients over 60.    FOLLOW UP:  - Follow up in 6 months.        Gagan Lobato" was seen today for blood sugar problem.    Diagnoses and all orders for this visit:    Mixed hyperlipidemia    Type 2 diabetes mellitus with hyperglycemia, without long-term current use of insulin  -     insulin glargine U-100, Lantus, (LANTUS SOLOSTAR U-100 INSULIN) 100 unit/mL (3 mL) InPn pen; Inject 40 Units into " the skin every evening. ADMINISTER 30 UNITS UNDER THE SKIN EVERY EVENING  -     POCT Glucose, Hand-Held Device; Future  -     Hemoglobin A1C; Future  -     Comprehensive Metabolic Panel; Future  -     Lipid Panel; Future    Essential hypertension  -     Comprehensive Metabolic Panel; Future  -     Lipid Panel; Future    Immunization due  -     influenza (Flulaval, Fluzone, Fluarix) 45 mcg/0.5 mL IM vaccine (> or = 6 mo) 0.5 mL         Follow up in about 6 months (around 4/17/2025).  This note was generated with the assistance of ambient listening technology. Verbal consent was obtained by the patient and accompanying visitor(s) for the recording of patient appointment to facilitate this note. I attest to having reviewed and edited the generated note for accuracy, though some syntax or spelling errors may persist. Please contact the author of this note for any clarification.

## 2025-01-03 RX ORDER — AMLODIPINE BESYLATE 5 MG/1
5 TABLET ORAL EVERY MORNING
Qty: 30 TABLET | Refills: 2 | Status: SHIPPED | OUTPATIENT
Start: 2025-01-03

## 2025-01-03 NOTE — TELEPHONE ENCOUNTER
----- Message from Aminta sent at 1/3/2025  9:45 AM CST -----  Refill on bp pill. Patient has been out of this medication for a week   [facility] [address]   [phone]

## 2025-01-03 NOTE — TELEPHONE ENCOUNTER
----- Message from Ashlee sent at 1/2/2025  7:38 AM CST -----  - 12/31-4:46- Melissa is calling for his blood pressure pills. Pharmacy has been requesting with no response.   [phone]

## 2025-01-14 ENCOUNTER — TELEPHONE (OUTPATIENT)
Dept: FAMILY MEDICINE | Facility: CLINIC | Age: 65
End: 2025-01-14
Payer: MEDICAID

## 2025-01-14 NOTE — TELEPHONE ENCOUNTER
Ozempic PA Approved    Prior authorization approved  Payer: [facility] - Medicaid Case ID: [id number]  Note from payer: PA has been Approved. PA End Date: 01/14/2026  Electronic appeal: Not supported  Prior auth initiated by: [facility] [address] [facility] [address] [facility]    [phone]

## 2025-02-18 ENCOUNTER — PATIENT OUTREACH (OUTPATIENT)
Dept: ADMINISTRATIVE | Facility: HOSPITAL | Age: 65
End: 2025-02-18
Payer: MEDICAID

## 2025-02-18 DIAGNOSIS — E78.2 MIXED HYPERLIPIDEMIA: ICD-10-CM

## 2025-02-18 RX ORDER — ATORVASTATIN CALCIUM 20 MG/1
20 TABLET, FILM COATED ORAL NIGHTLY
Qty: 90 TABLET | Refills: 1 | Status: SHIPPED | OUTPATIENT
Start: 2025-02-18 | End: 2025-05-19

## 2025-02-18 NOTE — PROGRESS NOTES
Population Health Chart Review & Patient Outreach Details      Additional [facility] Health Notes:               Updates Requested / Reviewed:      Updated Care Coordination Note, Care Everywhere, , Removed  or Duplicate Orders, and Immunizations Reconciliation Completed or Queried: [facility] Topics Overdue:      [facility] Score: 2     Eye Exam  Hemoglobin A1c    Pneumonia Vaccine  Tetanus Vaccine  Shingles/Zoster Vaccine  RSV Vaccine                  Health Maintenance Topic(s) Outreach Outcomes & Actions Taken:    Medication Adherence / Statins - Outreach Outcomes & Actions Taken  : Sent Provider Message to Review to Evaluate Pt for Statin, Add Exclusion Dx Codes, Document Exclusion in Problem List, Change Statin Intensity Level to Moderate or High Intensity if Applicable

## 2025-03-05 DIAGNOSIS — E11.65 TYPE 2 DIABETES MELLITUS WITH HYPERGLYCEMIA, WITHOUT LONG-TERM CURRENT USE OF INSULIN: ICD-10-CM

## 2025-03-05 RX ORDER — INSULIN GLARGINE 100 [IU]/ML
40 INJECTION, SOLUTION SUBCUTANEOUS NIGHTLY
Qty: 36 ML | Refills: 3 | Status: SHIPPED | OUTPATIENT
Start: 2025-03-05 | End: 2026-03-05

## 2025-03-05 NOTE — TELEPHONE ENCOUNTER
Care Due:                  Date            Visit Type   Department     Provider  --------------------------------------------------------------------------------                                EP - [facility]      [address]  Last Visit: 10-      [facility]                              EP - [facility] [address]  Next Visit: 04-      [facility]                                                            Last  Test          Frequency    Reason                     Performed    Due Date  --------------------------------------------------------------------------------    CMP.........  12 months..  atorvastatin, insulin....  08- 08-    HBA1C.......  6 months...  insulin, semaglutide.....  09- 03-    Lipid Panel.  12 months..  atorvastatin.............  08- 08-    [facility] Embedded Care Due Messages. Reference number: [id number].   3/05/2025 4:31:00 PM CST

## 2025-04-23 ENCOUNTER — TELEPHONE (OUTPATIENT)
Dept: FAMILY MEDICINE | Facility: CLINIC | Age: 65
End: 2025-04-23
Payer: MEDICAID

## 2025-04-29 DIAGNOSIS — I10 ESSENTIAL HYPERTENSION: ICD-10-CM

## 2025-04-29 NOTE — TELEPHONE ENCOUNTER
No care due was identified.  Health [facility] Embedded Care Due Messages. Reference number: [id number].   4/29/2025 4:27:40 PM CDT

## 2025-04-29 NOTE — TELEPHONE ENCOUNTER
----- Message from Yajaira sent at 4/29/2025  3:24 PM CDT -----  Pt's wife called for pt's bp medication to be sent [facility] on [phone]

## 2025-04-30 RX ORDER — OLMESARTAN MEDOXOMIL 40 MG/1
40 TABLET ORAL DAILY
Qty: 90 TABLET | Refills: 1 | Status: SHIPPED | OUTPATIENT
Start: 2025-04-30

## 2025-04-30 RX ORDER — AMLODIPINE BESYLATE 5 MG/1
5 TABLET ORAL EVERY MORNING
Qty: 90 TABLET | Refills: 0 | Status: SHIPPED | OUTPATIENT
Start: 2025-04-30

## 2025-05-29 ENCOUNTER — OFFICE VISIT (OUTPATIENT)
Dept: FAMILY MEDICINE | Facility: CLINIC | Age: 65
End: 2025-05-29
Payer: MEDICAID

## 2025-05-29 ENCOUNTER — LAB VISIT (OUTPATIENT)
Dept: LAB | Facility: HOSPITAL | Age: 65
End: 2025-05-29
Attending: FAMILY MEDICINE
Payer: MEDICAID

## 2025-05-29 VITALS
TEMPERATURE: 98 F | BODY MASS INDEX: 28.27 KG/M2 | DIASTOLIC BLOOD PRESSURE: 72 MMHG | SYSTOLIC BLOOD PRESSURE: 114 MMHG | HEIGHT: 72 IN | OXYGEN SATURATION: 98 % | HEART RATE: 96 BPM | WEIGHT: 208.75 LBS

## 2025-05-29 DIAGNOSIS — I10 ESSENTIAL HYPERTENSION: ICD-10-CM

## 2025-05-29 DIAGNOSIS — E11.65 TYPE 2 DIABETES MELLITUS WITH HYPERGLYCEMIA, WITHOUT LONG-TERM CURRENT USE OF INSULIN: ICD-10-CM

## 2025-05-29 DIAGNOSIS — R11.0 NAUSEA: ICD-10-CM

## 2025-05-29 DIAGNOSIS — E78.2 MIXED HYPERLIPIDEMIA: ICD-10-CM

## 2025-05-29 DIAGNOSIS — Z12.5 ENCOUNTER FOR PROSTATE CANCER SCREENING: ICD-10-CM

## 2025-05-29 DIAGNOSIS — E11.42 TYPE 2 DIABETES MELLITUS WITH DIABETIC POLYNEUROPATHY, WITH LONG-TERM CURRENT USE OF INSULIN: Primary | ICD-10-CM

## 2025-05-29 DIAGNOSIS — Z79.4 TYPE 2 DIABETES MELLITUS WITH DIABETIC POLYNEUROPATHY, WITH LONG-TERM CURRENT USE OF INSULIN: Primary | ICD-10-CM

## 2025-05-29 LAB
ALBUMIN SERPL BCP-MCNC: 3.9 G/DL (ref 3.5–5.2)
ALP SERPL-CCNC: 68 UNIT/L (ref 40–150)
ALT SERPL W/O P-5'-P-CCNC: 19 UNIT/L (ref 10–44)
ANION GAP (OHS): 10 MMOL/L (ref 8–16)
AST SERPL-CCNC: 16 UNIT/L (ref 11–45)
BILIRUB SERPL-MCNC: 1.2 MG/DL (ref 0.1–1)
BUN SERPL-MCNC: 9 MG/DL (ref 8–23)
CALCIUM SERPL-MCNC: 8.9 MG/DL (ref 8.7–10.5)
CHLORIDE SERPL-SCNC: 103 MMOL/L (ref 95–110)
CHOLEST SERPL-MCNC: 145 MG/DL (ref 120–199)
CHOLEST/HDLC SERPL: 2.3 {RATIO} (ref 2–5)
CO2 SERPL-SCNC: 25 MMOL/L (ref 23–29)
CREAT SERPL-MCNC: 0.8 MG/DL (ref 0.5–1.4)
EAG (OHS): 298 MG/DL (ref 68–131)
GFR SERPLBLD CREATININE-BSD FMLA CKD-EPI: >60 ML/MIN/1.73/M2
GLUCOSE SERPL-MCNC: 205 MG/DL (ref 70–110)
HBA1C MFR BLD: 12 % (ref 4–5.6)
HDLC SERPL-MCNC: 62 MG/DL (ref 40–75)
HDLC SERPL: 42.8 % (ref 20–50)
LDLC SERPL CALC-MCNC: 61 MG/DL (ref 63–159)
NONHDLC SERPL-MCNC: 83 MG/DL
POTASSIUM SERPL-SCNC: 4.2 MMOL/L (ref 3.5–5.1)
PROT SERPL-MCNC: 6.7 GM/DL (ref 6–8.4)
SODIUM SERPL-SCNC: 138 MMOL/L (ref 136–145)
TRIGL SERPL-MCNC: 110 MG/DL (ref 30–150)

## 2025-05-29 PROCEDURE — 99213 OFFICE O/P EST LOW 20 MIN: CPT | Mod: S$PBB,,, | Performed by: FAMILY MEDICINE

## 2025-05-29 PROCEDURE — 80053 COMPREHEN METABOLIC PANEL: CPT

## 2025-05-29 PROCEDURE — 3078F DIAST BP <80 MM HG: CPT | Mod: CPTII,,, | Performed by: FAMILY MEDICINE

## 2025-05-29 PROCEDURE — 99999 PR PBB SHADOW E&M-EST. PATIENT-LVL IV: CPT | Mod: PBBFAC,,, | Performed by: FAMILY MEDICINE

## 2025-05-29 PROCEDURE — 3008F BODY MASS INDEX DOCD: CPT | Mod: CPTII,,, | Performed by: FAMILY MEDICINE

## 2025-05-29 PROCEDURE — 1159F MED LIST DOCD IN RCRD: CPT | Mod: CPTII,,, | Performed by: FAMILY MEDICINE

## 2025-05-29 PROCEDURE — 83036 HEMOGLOBIN GLYCOSYLATED A1C: CPT

## 2025-05-29 PROCEDURE — 99214 OFFICE O/P EST MOD 30 MIN: CPT | Mod: PBBFAC,PN | Performed by: FAMILY MEDICINE

## 2025-05-29 PROCEDURE — 4010F ACE/ARB THERAPY RXD/TAKEN: CPT | Mod: CPTII,,, | Performed by: FAMILY MEDICINE

## 2025-05-29 PROCEDURE — 36415 COLL VENOUS BLD VENIPUNCTURE: CPT | Mod: PN

## 2025-05-29 PROCEDURE — 3074F SYST BP LT 130 MM HG: CPT | Mod: CPTII,,, | Performed by: FAMILY MEDICINE

## 2025-05-29 PROCEDURE — 80061 LIPID PANEL: CPT

## 2025-05-29 RX ORDER — ESCITALOPRAM OXALATE 10 MG/1
10 TABLET ORAL DAILY
Qty: 90 TABLET | Refills: 3 | Status: SHIPPED | OUTPATIENT
Start: 2025-05-29 | End: 2026-05-29

## 2025-05-29 RX ORDER — PEN NEEDLE, DIABETIC 30 GX3/16"
NEEDLE, DISPOSABLE MISCELLANEOUS
Qty: 100 EACH | Refills: 5 | Status: SHIPPED | OUTPATIENT
Start: 2025-05-29

## 2025-05-29 NOTE — PROGRESS NOTES
Subjective:       Patient ID: Gagan Escobar is a 65 y.o. male.    Chief Complaint: Follow-up (6 month follow up )    Lab Results   Component Value Date    WBC 7.1 05/11/2024    HGB 15.0 05/11/2024    HCT 45.8 05/11/2024     05/11/2024    CHOL 264 (H) 05/11/2024    TRIG 343 (H) 05/11/2024    HDL 52 05/11/2024    ALT 28 08/23/2023    AST 25 08/23/2023     08/23/2023    K 4.1 08/23/2023     08/23/2023    CREATININE 1.0 08/23/2023    BUN 14 08/23/2023    CO2 26 08/23/2023    TSH 4.870 08/23/2023    PSA 3.9 08/23/2023    HGBA1C 9.2 (H) 09/24/2024     Wt Readings from Last 5 Encounters:   05/29/25 94.7 kg (208 lb 12.4 oz)   10/17/24 90.3 kg (199 lb)   09/24/24 90.3 kg (199 lb)   09/10/24 88 kg (194 lb)   07/23/24 90 kg (198 lb 6.6 oz)     Subjective:       Patient ID: Gagan Escobar is a 65 y.o. male.    Chief Complaint: Follow-up (6 month follow up )      History of Present Illness    CHIEF COMPLAINT:  Mr. Escobar presents for a 6-month follow-up visit for essential hypertension, diabetes, and cholesterol management.    HPI:  Mr. Escobar reports shoulder, back, and knee pain that have been present for years but continue to flare up and migrate. The shoulder pain is particularly bothersome when sleeping on the left side, causing significant discomfort. Mr. Escobar describes audible popping, snapping, and cracking sounds in the shoulder, which a chiropractor suggested might be related to the rotator cuff. He only finds relief when lying supine but cannot maintain this position indefinitely.    Mr. Escobar mentions occasional discomfort in his great toe, which flares up and becomes painful at times, but typically resolves within a day. He denies severe pain in the great toe suggestive of gout.    Regarding diabetes management, patient reports nausea and vomiting after taking Ozempic. He took a 1mg dose and had severe vomiting that night, describing the emesis as thick and clear. Due to this  side effect, patient stopped taking Ozempic about a week ago. He also mentions feeling occasional abdominal discomfort, but denies abdominal pain.    Mr. Escobar's weight has fluctuated recently. He was down to 194 lbs in September of last year but has since gained weight, currently weighing 208 lbs. He expresses a desire to lose weight and plans to start going to the gym.    Mr. Escobar mentions feeling anxious, which has been noticed by others, including his doctor. He is currently taking Lexapro (escitalopram) for anxiety management, which he reports is effective.    MEDICATIONS:  Mr. Escobar is on Lipitor (Atorvastatin) daily for cholesterol management and Lantus (insulin) via pen injection for diabetes. He is also taking Lexapro for anxiety. Mr. Escobar discontinued Ozempic 1 mg, which he was taking every Monday for diabetes and weight loss, 1 week ago due to side effects of nausea and vomiting. He also discontinued Trulicity in the past due to insurance not covering it.    MEDICAL HISTORY:  Mr. Escobar has a history of essential hypertension, diabetes, high cholesterol, and athlete's foot. Mr. Escobar received a flu vaccine last year and has been vaccinated against COVID-19.    TEST RESULTS:  Mr. Escobar's cholesterol was high 1 year ago and needs to be rechecked. His A1C was also high 1 year ago and requires a recheck. Mr. Escobar underwent a diabetic eye exam last year. He also had a foot exam last year, which showed everything looked good except for some athlete's foot.    SOCIAL HISTORY:  Occupation: [employer]  for 3 years      ROS:  Constitutional: +sleep disturbances  Gastrointestinal: +abdominal pain, +nausea, +vomiting  Musculoskeletal: +joint pain, +back pain, +limb pain, +pain with movement, +nightime pain  Psychiatric: +anxiety          Allergies and Medications:   Review of patient's allergies indicates:   Allergen Reactions    Ace inhibitors Rash    Pcn [penicillins]      Current  Medications[1]    Family History:   Family History   Problem Relation Name Age of Onset    Heart disease Mother      Mental illness Mother      Asthma Father         Social History:   Social History[2]        Objective:     Vitals:    05/29/25 1527   BP: 114/72   Pulse: 96   Temp: 97.7 °F (36.5 °C)        Physical Exam    Vitals: Blood pressure: 140/72.  General: No acute distress. Well-developed. Well-nourished.  Eyes: EOMI. Sclerae anicteric.  HENT: Normocephalic. Atraumatic. Nares patent. Moist oral mucosa.  Cardiovascular: Regular rate. Regular rhythm. No murmurs. No rubs. No gallops. Normal S1, S2. Capillary refill is adequate. Peripheral pulses upper extremities symmetrical intact.  Respiratory: Normal respiratory effort. Clear to auscultation bilaterally. No rales. No rhonchi. No wheezing.  Abdomen: Soft. Non-tender.  Musculoskeletal: No  obvious deformity.  Extremities: No lower extremity edema. Trace peripheral edema. No pretibial lipodystrophy.  Neurological: Alert & oriented x3. No slurred speech. Normal gait.  Psychiatric: Normal mood. Normal affect. Good insight. Good judgment.  Skin: Warm. Dry. No rash. Complexion consistent. Good turgor. Intact.            Assessment:       1. Type 2 diabetes mellitus with diabetic polyneuropathy, with long-term current use of insulin    2. Essential hypertension    3. Mixed hyperlipidemia    4. Encounter for prostate cancer screening    5. Type 2 diabetes mellitus with hyperglycemia, without long-term current use of insulin    6. Nausea        Plan:       Assessment & Plan    I10 Essential (primary) hypertension  E11.9 Type 2 diabetes mellitus without complications  R60.0 Localized edema  T38.3X5A Adverse effect of insulin and oral hypoglycemic [antidiabetic] drugs, initial encounter  E65 Localized adiposity  Z79.4 Long term (current) use of insulin    HYPERTENSION:  - Blood pressure is currently well-controlled at 140/72.    TYPE 2 DIABETES MELLITUS:  - Monitored  "A1C, which was high last year, with recent labs done this morning and results pending.  - Ordered CMP to check pancreas and chemistries again.  - Considering restarting Ozempic 0.5 mg weekly for diabetes control, pending lipase results to rule out pancreatitis.  - Mr. Escobar was previously on Ozempic 1 mg weekly but discontinued due to nausea and vomiting.  - Explained this is a common side effect and suggested eating smaller portions to mitigate symptoms.  - Mr. Escobar is currently on insulin therapy; refilled pen needles for continued administration.  - Instructed patient to monitor glucose levels twice daily for 2 weeks, maintain a log including any instances of vomiting, and contact the office if experiencing significant side effects from medications.    EDEMA:  - Examination revealed symmetrically developed extremities with trace peripheral edema, no pretibial lipodystrophy evident.    WEIGHT MANAGEMENT:  - Considering restarting Ozempic 0.5 mg weekly for weight management, pending lipase results.  - Mr. Escobar plans to start exercising at the gym to lose weight.  - Clarified that exercise alone is not sufficient for weight loss and emphasized the importance of portion control in diet for effective weight management.    OTHER CONDITIONS:  - Evaluated shoulder pain, suspecting rotator cuff issue.  - Assessed HLD.  - Reviewed anxiety symptoms and will continue Lexapro (escitalopram) for anxiety management.  - Discussed potential benefits of exercise for overall health, including back pain and flexibility improvement.  - Ordered PSA test.    FOLLOW-UP:  - Scheduled follow up in 2 weeks with glucose log for reassessment.        Gagan Lobato" was seen today for follow-up.    Diagnoses and all orders for this visit:    Type 2 diabetes mellitus with diabetic polyneuropathy, with long-term current use of insulin  -     Diabetic Eye Screening Photo; Future    Essential hypertension    Mixed " "hyperlipidemia    Encounter for prostate cancer screening  -     PSA, Screening; Future    Type 2 diabetes mellitus with hyperglycemia, without long-term current use of insulin  -     pen needle, diabetic (BD ULTRA-FINE SHORT PEN NEEDLE) 31 gauge x 5/16" Ndle; USE 1 PEN NEEDLE DAILY TO ADMINISTER INSULIN    Nausea  -     Lipase; Future  -     EScitalopram oxalate (LEXAPRO) 10 MG tablet; Take 1 tablet (10 mg total) by mouth once daily.         Follow up in about 2 weeks (around 6/12/2025) for follow up DM.  This note was generated with the assistance of ambient listening technology. Verbal consent was obtained by the patient and accompanying visitor(s) for the recording of patient appointment to facilitate this note. I attest to having reviewed and edited the generated note for accuracy, though some syntax or spelling errors may persist. Please contact the author of this note for any clarification.         History of Present Illness    CHIEF COMPLAINT:  Mr. Escobar presents for a 6-month follow-up visit for essential hypertension, diabetes, and cholesterol management.    HPI:  Mr. Escobar reports shoulder, back, and knee pain that have been present for years but continue to flare up and migrate. The shoulder pain is particularly bothersome when sleeping on the left side, causing significant discomfort. Mr. Escobar describes audible popping, snapping, and cracking sounds in the shoulder, which a chiropractor suggested might be related to the rotator cuff. He only finds relief when lying supine but cannot maintain this position indefinitely.    Mr. Escobar mentions occasional discomfort in his great toe, which flares up and becomes painful at times, but typically resolves within a day. He denies severe pain in the great toe suggestive of gout.    Regarding diabetes management, patient reports nausea and vomiting after taking Ozempic. He took a 1mg dose and had severe vomiting that night, describing the emesis as thick " and clear. Due to this side effect, patient stopped taking Ozempic about a week ago. He also mentions feeling occasional abdominal discomfort, but denies abdominal pain.    Mr. Escobar's weight has fluctuated recently. He was down to 194 lbs in September of last year but has since gained weight, currently weighing 208 lbs. He expresses a desire to lose weight and plans to start going to the gym.    Mr. Escobar mentions feeling anxious, which has been noticed by others, including his doctor. He is currently taking Lexapro (escitalopram) for anxiety management, which he reports is effective.    MEDICATIONS:  Mr. Escobar is on Lipitor (Atorvastatin) daily for cholesterol management and Lantus (insulin) via pen injection for diabetes. He is also taking Lexapro for anxiety. Mr. Escobar discontinued Ozempic 1 mg, which he was taking every Monday for diabetes and weight loss, 1 week ago due to side effects of nausea and vomiting. He also discontinued Trulicity in the past due to insurance not covering it.    MEDICAL HISTORY:  Mr. Escobar has a history of essential hypertension, diabetes, high cholesterol, and athlete's foot. Mr. Escobar received a flu vaccine last year and has been vaccinated against COVID-19.    TEST RESULTS:  Mr. Escobar's cholesterol was high 1 year ago and needs to be rechecked. His A1C was also high 1 year ago and requires a recheck. Mr. Escobar underwent a diabetic eye exam last year. He also had a foot exam last year, which showed everything looked good except for some athlete's foot.    SOCIAL HISTORY:  Occupation: [employer]  for 3 years      ROS:  Constitutional: +sleep disturbances  Gastrointestinal: +abdominal pain, +nausea, +vomiting  Musculoskeletal: +joint pain, +back pain, +limb pain, +pain with movement, +nightime pain  Psychiatric: +anxiety          Allergies and Medications:   Review of patient's allergies indicates:   Allergen Reactions    Ace inhibitors Rash    Pcn [penicillins]       Current Medications[3]    Family History:   Family History   Problem Relation Name Age of Onset    Heart disease Mother      Mental illness Mother      Asthma Father         Social History:   Social History[4]        Objective:     Vitals:    05/29/25 1527   BP: 114/72   Pulse: 96   Temp: 97.7 °F (36.5 °C)        Physical Exam    Vitals: Blood pressure: 140/72.  General: No acute distress. Well-developed. Well-nourished.  Eyes: EOMI. Sclerae anicteric.  HENT: Normocephalic. Atraumatic. Nares patent. Moist oral mucosa.  Cardiovascular: Regular rate. Regular rhythm. No murmurs. No rubs. No gallops. Normal S1, S2. Capillary refill is adequate. Peripheral pulses upper extremities symmetrical intact.  Respiratory: Normal respiratory effort. Clear to auscultation bilaterally. No rales. No rhonchi. No wheezing.  Abdomen: Soft. Non-tender.  Musculoskeletal: No  obvious deformity.  Extremities: No lower extremity edema. Trace peripheral edema. No pretibial lipodystrophy.  Neurological: Alert & oriented x3. No slurred speech. Normal gait.  Psychiatric: Normal mood. Normal affect. Good insight. Good judgment.  Skin: Warm. Dry. No rash. Complexion consistent. Good turgor. Intact.            Assessment:       1. Type 2 diabetes mellitus with diabetic polyneuropathy, with long-term current use of insulin    2. Essential hypertension    3. Mixed hyperlipidemia    4. Encounter for prostate cancer screening    5. Type 2 diabetes mellitus with hyperglycemia, without long-term current use of insulin    6. Nausea        Plan:       Assessment & Plan    I10 Essential (primary) hypertension  E11.9 Type 2 diabetes mellitus without complications  R60.0 Localized edema  T38.3X5A Adverse effect of insulin and oral hypoglycemic [antidiabetic] drugs, initial encounter  E65 Localized adiposity  Z79.4 Long term (current) use of insulin    HYPERTENSION:  - Blood pressure is currently well-controlled at 140/72.    TYPE 2 DIABETES MELLITUS:  -  "Monitored A1C, which was high last year, with recent labs done this morning and results pending.  - Ordered CMP to check pancreas and chemistries again.  - Considering restarting Ozempic 0.5 mg weekly for diabetes control, pending lipase results to rule out pancreatitis.  - Mr. Escobra was previously on Ozempic 1 mg weekly but discontinued due to nausea and vomiting.  - Explained this is a common side effect and suggested eating smaller portions to mitigate symptoms.  - Mr. Escobar is currently on insulin therapy; refilled pen needles for continued administration.  - Instructed patient to monitor glucose levels twice daily for 2 weeks, maintain a log including any instances of vomiting, and contact the office if experiencing significant side effects from medications.    EDEMA:  - Examination revealed symmetrically developed extremities with trace peripheral edema, no pretibial lipodystrophy evident.    WEIGHT MANAGEMENT:  - Considering restarting Ozempic 0.5 mg weekly for weight management, pending lipase results.  - Mr. Escobar plans to start exercising at the gym to lose weight.  - Clarified that exercise alone is not sufficient for weight loss and emphasized the importance of portion control in diet for effective weight management.    OTHER CONDITIONS:  - Evaluated shoulder pain, suspecting rotator cuff issue.  - Assessed HLD.  - Reviewed anxiety symptoms and will continue Lexapro (escitalopram) for anxiety management.  - Discussed potential benefits of exercise for overall health, including back pain and flexibility improvement.  - Ordered PSA test.    FOLLOW-UP:  - Scheduled follow up in 2 weeks with glucose log for reassessment.        Gagan Lobato" was seen today for follow-up.    Diagnoses and all orders for this visit:    Type 2 diabetes mellitus with diabetic polyneuropathy, with long-term current use of insulin  -     Diabetic Eye Screening Photo; Future    Essential hypertension    Mixed " "hyperlipidemia    Encounter for prostate cancer screening  -     PSA, Screening; Future    Type 2 diabetes mellitus with hyperglycemia, without long-term current use of insulin  -     pen needle, diabetic (BD ULTRA-FINE SHORT PEN NEEDLE) 31 gauge x 5/16" Ndle; USE 1 PEN NEEDLE DAILY TO ADMINISTER INSULIN    Nausea  -     Lipase; Future  -     EScitalopram oxalate (LEXAPRO) 10 MG tablet; Take 1 tablet (10 mg total) by mouth once daily.         Follow up in about 2 weeks (around 6/12/2025) for follow up DM.  This note was generated with the assistance of ambient listening technology. Verbal consent was obtained by the patient and accompanying visitor(s) for the recording of patient appointment to facilitate this note. I attest to having reviewed and edited the generated note for accuracy, though some syntax or spelling errors may persist. Please contact the author of this note for any clarification.     Subjective:       Patient ID: Gagan Escobar is a 65 y.o. male.    Chief Complaint: Follow-up (6 month follow up )      History of Present Illness    CHIEF COMPLAINT:  Mr. Escobar presents for a 6-month follow-up visit for essential hypertension, diabetes, and cholesterol management.    HPI:  Mr. Escobar reports shoulder, back, and knee pain that have been present for years but continue to flare up and migrate. The shoulder pain is particularly bothersome when sleeping on the left side, causing significant discomfort. Mr. Escobar describes audible popping, snapping, and cracking sounds in the shoulder, which a chiropractor suggested might be related to the rotator cuff. He only finds relief when lying supine but cannot maintain this position indefinitely.    Mr. Escobar mentions occasional discomfort in his great toe, which flares up and becomes painful at times, but typically resolves within a day. He denies severe pain in the great toe suggestive of gout.    Regarding diabetes management, patient reports nausea " and vomiting after taking Ozempic. He took a 1mg dose and had severe vomiting that night, describing the emesis as thick and clear. Due to this side effect, patient stopped taking Ozempic about a week ago. He also mentions feeling occasional abdominal discomfort, but denies abdominal pain.    Mr. Gipsons weight has fluctuated recently. He was down to 194 lbs in September of last year but has since gained weight, currently weighing 208 lbs. He expresses a desire to lose weight and plans to start going to the gym.    Mr. Escobar mentions feeling anxious, which has been noticed by others, including his doctor. He is currently taking Lexapro (escitalopram) for anxiety management, which he reports is effective.    MEDICATIONS:  Mr. Escobar is on Lipitor (Atorvastatin) daily for cholesterol management and Lantus (insulin) via pen injection for diabetes. He is also taking Lexapro for anxiety. Mr. Escobar discontinued Ozempic 1 mg, which he was taking every Monday for diabetes and weight loss, 1 week ago due to side effects of nausea and vomiting. He also discontinued Trulicity in the past due to insurance not covering it.    MEDICAL HISTORY:  Mr. Escobar has a history of essential hypertension, diabetes, high cholesterol, and athlete's foot. Mr. Escobar received a flu vaccine last year and has been vaccinated against COVID-19.    TEST RESULTS:  Mr. Escobar's cholesterol was high 1 year ago and needs to be rechecked. His A1C was also high 1 year ago and requires a recheck. Mr. Escobar underwent a diabetic eye exam last year. He also had a foot exam last year, which showed everything looked good except for some athlete's foot.    SOCIAL HISTORY:  Occupation: [employer]  for 3 years      ROS:  Constitutional: +sleep disturbances  Gastrointestinal: +abdominal pain, +nausea, +vomiting  Musculoskeletal: +joint pain, +back pain, +limb pain, +pain with movement, +nightime pain  Psychiatric: +anxiety          Allergies and  Medications:   Review of patient's allergies indicates:   Allergen Reactions    Ace inhibitors Rash    Pcn [penicillins]      Current Medications[5]    Family History:   Family History   Problem Relation Name Age of Onset    Heart disease Mother      Mental illness Mother      Asthma Father         Social History:   Social History[6]        Objective:     Vitals:    05/29/25 1527   BP: 114/72   Pulse: 96   Temp: 97.7 °F (36.5 °C)        Physical Exam    Vitals: Blood pressure: 140/72.  General: No acute distress. Well-developed. Well-nourished.  Eyes: EOMI. Sclerae anicteric.  HENT: Normocephalic. Atraumatic. Nares patent. Moist oral mucosa.  Cardiovascular: Regular rate. Regular rhythm. No murmurs. No rubs. No gallops. Normal S1, S2. Capillary refill is adequate. Peripheral pulses upper extremities symmetrical intact.  Respiratory: Normal respiratory effort. Clear to auscultation bilaterally. No rales. No rhonchi. No wheezing.  Abdomen: Soft. Non-tender.  Musculoskeletal: No  obvious deformity.  Extremities: No lower extremity edema. Trace peripheral edema. No pretibial lipodystrophy.  Neurological: Alert & oriented x3. No slurred speech. Normal gait.  Psychiatric: Normal mood. Normal affect. Good insight. Good judgment.  Skin: Warm. Dry. No rash. Complexion consistent. Good turgor. Intact.            Assessment:       1. Type 2 diabetes mellitus with diabetic polyneuropathy, with long-term current use of insulin    2. Essential hypertension    3. Mixed hyperlipidemia    4. Encounter for prostate cancer screening    5. Type 2 diabetes mellitus with hyperglycemia, without long-term current use of insulin    6. Nausea        Plan:       Assessment & Plan    I10 Essential (primary) hypertension  E11.9 Type 2 diabetes mellitus without complications  R60.0 Localized edema  T38.3X5A Adverse effect of insulin and oral hypoglycemic [antidiabetic] drugs, initial encounter  E65 Localized adiposity  Z79.4 Long term (current)  "use of insulin    HYPERTENSION:  - Blood pressure is currently well-controlled at 140/72.    TYPE 2 DIABETES MELLITUS:  - Monitored A1C, which was high last year, with recent labs done this morning and results pending.  - Ordered CMP to check pancreas and chemistries again.  - Considering restarting Ozempic 0.5 mg weekly for diabetes control, pending lipase results to rule out pancreatitis.  - Mr. Escobar was previously on Ozempic 1 mg weekly but discontinued due to nausea and vomiting.  - Explained this is a common side effect and suggested eating smaller portions to mitigate symptoms.  - Mr. Escobar is currently on insulin therapy; refilled pen needles for continued administration.  - Instructed patient to monitor glucose levels twice daily for 2 weeks, maintain a log including any instances of vomiting, and contact the office if experiencing significant side effects from medications.    EDEMA:  - Examination revealed symmetrically developed extremities with trace peripheral edema, no pretibial lipodystrophy evident.    WEIGHT MANAGEMENT:  - Considering restarting Ozempic 0.5 mg weekly for weight management, pending lipase results.  - Mr. Escobar plans to start exercising at the gym to lose weight.  - Clarified that exercise alone is not sufficient for weight loss and emphasized the importance of portion control in diet for effective weight management.    OTHER CONDITIONS:  - Evaluated shoulder pain, suspecting rotator cuff issue.  - Assessed HLD.  - Reviewed anxiety symptoms and will continue Lexapro (escitalopram) for anxiety management.  - Discussed potential benefits of exercise for overall health, including back pain and flexibility improvement.  - Ordered PSA test.    FOLLOW-UP:  - Scheduled follow up in 2 weeks with glucose log for reassessment.        Gagan Lobato" was seen today for follow-up.    Diagnoses and all orders for this visit:    Type 2 diabetes mellitus with diabetic polyneuropathy, with " "long-term current use of insulin  -     Diabetic Eye Screening Photo; Future    Essential hypertension    Mixed hyperlipidemia    Encounter for prostate cancer screening  -     PSA, Screening; Future    Type 2 diabetes mellitus with hyperglycemia, without long-term current use of insulin  -     pen needle, diabetic (BD ULTRA-FINE SHORT PEN NEEDLE) 31 gauge x 5/16" Ndle; USE 1 PEN NEEDLE DAILY TO ADMINISTER INSULIN    Nausea  -     Lipase; Future  -     EScitalopram oxalate (LEXAPRO) 10 MG tablet; Take 1 tablet (10 mg total) by mouth once daily.         Follow up in about 2 weeks (around 6/12/2025) for follow up DM.  This note was generated with the assistance of ambient listening technology. Verbal consent was obtained by the patient and accompanying visitor(s) for the recording of patient appointment to facilitate this note. I attest to having reviewed and edited the generated note for accuracy, though some syntax or spelling errors may persist. Please contact the author of this note for any clarification.            [1]   Current Outpatient Medications   Medication Sig Dispense Refill    amLODIPine (NORVASC) 5 MG tablet Take 1 tablet (5 mg total) by mouth every morning. 90 tablet 0    aspirin (ECOTRIN) 81 MG EC tablet Take 1 tablet (81 mg total) by mouth once daily. 90 tablet 1    atorvastatin (LIPITOR) 20 MG tablet Take 1 tablet (20 mg total) by mouth every evening. 90 tablet 1    blood sugar diagnostic Strp 2 strips by Misc.(Non-Drug; Combo Route) route 2 (two) times a day. 200 strip 1    blood-glucose meter kit Use as instructed 1 each 0    blood-glucose meter,continuous Misc 1 Device by Misc.(Non-Drug; Combo Route) route 4 (four) times daily. 1 each 0    clotrimazole-betamethasone 1-0.05% (LOTRISONE) cream       insulin glargine U-100, Lantus, (LANTUS SOLOSTAR U-100 INSULIN) 100 unit/mL (3 mL) InPn pen Inject 40 Units into the skin every evening. ADMINISTER 30 UNITS UNDER THE SKIN EVERY EVENING 36 mL 3    " "lancets 30 gauge Misc 2 lancets by Misc.(Non-Drug; Combo Route) route 2 (two) times a day. 200 each 1    lancets Misc To check BG 2 times daily, to use with insurance preferred meter 100 each 5    nystatin (MYCOSTATIN) cream       olmesartan (BENICAR) 40 MG tablet Take 1 tablet (40 mg total) by mouth once daily. 90 tablet 1    triamcinolone acetonide 0.1% (KENALOG) 0.1 % cream       blood-glucose sensor (DEXCOM G7 SENSOR) Violet 1 Device by Misc.(Non-Drug; Combo Route) route 4 (four) times daily. (Patient not taking: Reported on 5/29/2025) 1 each 0    blood-glucose sensor Violet 1 Device by Misc.(Non-Drug; Combo Route) route 4 (four) times daily. (Patient not taking: Reported on 5/29/2025) 3 each 11    cetirizine (ZYRTEC) 10 MG tablet  (Patient not taking: Reported on 5/29/2025)      EScitalopram oxalate (LEXAPRO) 10 MG tablet Take 1 tablet (10 mg total) by mouth once daily. 90 tablet 3    fluticasone propionate (FLONASE) 50 mcg/actuation nasal spray  (Patient not taking: Reported on 5/29/2025)      omeprazole (PRILOSEC) 40 MG capsule  (Patient not taking: Reported on 5/29/2025)      pen needle, diabetic (BD ULTRA-FINE SHORT PEN NEEDLE) 31 gauge x 5/16" Ndle USE 1 PEN NEEDLE DAILY TO ADMINISTER INSULIN 100 each 5    potassium chloride (KLOR-CON) 10 MEQ TbSR  (Patient not taking: Reported on 5/29/2025)      semaglutide (OZEMPIC) 1 mg/dose (4 mg/3 mL) Inject 1 mg into the skin every 7 days. (Patient not taking: Reported on 5/29/2025) 3 mL 11     No current facility-administered medications for this visit.   [2]   Social History  Socioeconomic History    Marital status:    Tobacco Use    Smoking status: Never    Smokeless tobacco: Current   Substance and Sexual Activity    Alcohol use: Yes    Drug use: No    Sexual activity: Yes     Social Drivers of Health     Stress: No Stress Concern Present (10/9/2020)    [employer] - Occupational Stress Questionnaire     Feeling of Stress : Not at " all   [3]   Current Outpatient Medications   Medication Sig Dispense Refill    amLODIPine (NORVASC) 5 MG tablet Take 1 tablet (5 mg total) by mouth every morning. 90 tablet 0    aspirin (ECOTRIN) 81 MG EC tablet Take 1 tablet (81 mg total) by mouth once daily. 90 tablet 1    atorvastatin (LIPITOR) 20 MG tablet Take 1 tablet (20 mg total) by mouth every evening. 90 tablet 1    blood sugar diagnostic Strp 2 strips by Misc.(Non-Drug; Combo Route) route 2 (two) times a day. 200 strip 1    blood-glucose meter kit Use as instructed 1 each 0    blood-glucose meter,continuous Misc 1 Device by Misc.(Non-Drug; Combo Route) route 4 (four) times daily. 1 each 0    clotrimazole-betamethasone 1-0.05% (LOTRISONE) cream       insulin glargine U-100, Lantus, (LANTUS SOLOSTAR U-100 INSULIN) 100 unit/mL (3 mL) InPn pen Inject 40 Units into the skin every evening. ADMINISTER 30 UNITS UNDER THE SKIN EVERY EVENING 36 mL 3    lancets 30 gauge Misc 2 lancets by Misc.(Non-Drug; Combo Route) route 2 (two) times a day. 200 each 1    lancets Misc To check BG 2 times daily, to use with insurance preferred meter 100 each 5    nystatin (MYCOSTATIN) cream       olmesartan (BENICAR) 40 MG tablet Take 1 tablet (40 mg total) by mouth once daily. 90 tablet 1    triamcinolone acetonide 0.1% (KENALOG) 0.1 % cream       blood-glucose sensor (DEXCOM G7 SENSOR) Violet 1 Device by Misc.(Non-Drug; Combo Route) route 4 (four) times daily. (Patient not taking: Reported on 5/29/2025) 1 each 0    blood-glucose sensor Violet 1 Device by Misc.(Non-Drug; Combo Route) route 4 (four) times daily. (Patient not taking: Reported on 5/29/2025) 3 each 11    cetirizine (ZYRTEC) 10 MG tablet  (Patient not taking: Reported on 5/29/2025)      EScitalopram oxalate (LEXAPRO) 10 MG tablet Take 1 tablet (10 mg total) by mouth once daily. 90 tablet 3    fluticasone propionate (FLONASE) 50 mcg/actuation nasal spray  (Patient not taking: Reported on 5/29/2025)      omeprazole (PRILOSEC)  "40 MG capsule  (Patient not taking: Reported on 5/29/2025)      pen needle, diabetic (BD ULTRA-FINE SHORT PEN NEEDLE) 31 gauge x 5/16" Ndle USE 1 PEN NEEDLE DAILY TO ADMINISTER INSULIN 100 each 5    potassium chloride (KLOR-CON) 10 MEQ TbSR  (Patient not taking: Reported on 5/29/2025)      semaglutide (OZEMPIC) 1 mg/dose (4 mg/3 mL) Inject 1 mg into the skin every 7 days. (Patient not taking: Reported on 5/29/2025) 3 mL 11     No current facility-administered medications for this visit.   [4]   Social History  Socioeconomic History    Marital status:    Tobacco Use    Smoking status: Never    Smokeless tobacco: Current   Substance and Sexual Activity    Alcohol use: Yes    Drug use: No    Sexual activity: Yes     Social Drivers of Health     Stress: No Stress Concern Present (10/9/2020)    [employer] - Occupational Stress Questionnaire     Feeling of Stress : Not at all   [5]   Current Outpatient Medications   Medication Sig Dispense Refill    amLODIPine (NORVASC) 5 MG tablet Take 1 tablet (5 mg total) by mouth every morning. 90 tablet 0    aspirin (ECOTRIN) 81 MG EC tablet Take 1 tablet (81 mg total) by mouth once daily. 90 tablet 1    atorvastatin (LIPITOR) 20 MG tablet Take 1 tablet (20 mg total) by mouth every evening. 90 tablet 1    blood sugar diagnostic Strp 2 strips by Misc.(Non-Drug; Combo Route) route 2 (two) times a day. 200 strip 1    blood-glucose meter kit Use as instructed 1 each 0    blood-glucose meter,continuous Misc 1 Device by Misc.(Non-Drug; Combo Route) route 4 (four) times daily. 1 each 0    clotrimazole-betamethasone 1-0.05% (LOTRISONE) cream       insulin glargine U-100, Lantus, (LANTUS SOLOSTAR U-100 INSULIN) 100 unit/mL (3 mL) InPn pen Inject 40 Units into the skin every evening. ADMINISTER 30 UNITS UNDER THE SKIN EVERY EVENING 36 mL 3    lancets 30 gauge Misc 2 lancets by Misc.(Non-Drug; Combo Route) route 2 (two) times a day. 200 each 1    lancets " "Misc To check BG 2 times daily, to use with insurance preferred meter 100 each 5    nystatin (MYCOSTATIN) cream       olmesartan (BENICAR) 40 MG tablet Take 1 tablet (40 mg total) by mouth once daily. 90 tablet 1    triamcinolone acetonide 0.1% (KENALOG) 0.1 % cream       blood-glucose sensor (DEXCOM G7 SENSOR) Violet 1 Device by Misc.(Non-Drug; Combo Route) route 4 (four) times daily. (Patient not taking: Reported on 5/29/2025) 1 each 0    blood-glucose sensor Violet 1 Device by Misc.(Non-Drug; Combo Route) route 4 (four) times daily. (Patient not taking: Reported on 5/29/2025) 3 each 11    cetirizine (ZYRTEC) 10 MG tablet  (Patient not taking: Reported on 5/29/2025)      EScitalopram oxalate (LEXAPRO) 10 MG tablet Take 1 tablet (10 mg total) by mouth once daily. 90 tablet 3    fluticasone propionate (FLONASE) 50 mcg/actuation nasal spray  (Patient not taking: Reported on 5/29/2025)      omeprazole (PRILOSEC) 40 MG capsule  (Patient not taking: Reported on 5/29/2025)      pen needle, diabetic (BD ULTRA-FINE SHORT PEN NEEDLE) 31 gauge x 5/16" Ndle USE 1 PEN NEEDLE DAILY TO ADMINISTER INSULIN 100 each 5    potassium chloride (KLOR-CON) 10 MEQ TbSR  (Patient not taking: Reported on 5/29/2025)      semaglutide (OZEMPIC) 1 mg/dose (4 mg/3 mL) Inject 1 mg into the skin every 7 days. (Patient not taking: Reported on 5/29/2025) 3 mL 11     No current facility-administered medications for this visit.   [6]   Social History  Socioeconomic History    Marital status:    Tobacco Use    Smoking status: Never    Smokeless tobacco: Current   Substance and Sexual Activity    Alcohol use: Yes    Drug use: No    Sexual activity: Yes     Social Drivers of Health     Stress: No Stress Concern Present (10/9/2020)    [employer] [employer] - Occupational Stress Questionnaire     Feeling of Stress : Not at all     "

## 2025-05-30 ENCOUNTER — RESULTS FOLLOW-UP (OUTPATIENT)
Dept: FAMILY MEDICINE | Facility: CLINIC | Age: 65
End: 2025-05-30

## 2025-06-03 ENCOUNTER — TELEPHONE (OUTPATIENT)
Dept: FAMILY MEDICINE | Facility: CLINIC | Age: 65
End: 2025-06-03
Payer: MEDICAID

## 2025-06-03 DIAGNOSIS — M25.512 LEFT SHOULDER PAIN, UNSPECIFIED CHRONICITY: Primary | ICD-10-CM

## 2025-06-11 ENCOUNTER — CLINICAL SUPPORT (OUTPATIENT)
Dept: FAMILY MEDICINE | Facility: CLINIC | Age: 65
End: 2025-06-11
Payer: MEDICAID

## 2025-06-11 DIAGNOSIS — E11.42 TYPE 2 DIABETES MELLITUS WITH DIABETIC POLYNEUROPATHY, WITH LONG-TERM CURRENT USE OF INSULIN: ICD-10-CM

## 2025-06-11 DIAGNOSIS — Z79.4 TYPE 2 DIABETES MELLITUS WITH DIABETIC POLYNEUROPATHY, WITH LONG-TERM CURRENT USE OF INSULIN: ICD-10-CM

## 2025-06-11 PROCEDURE — 92228 IMG RTA DETC/MNTR DS PHY/QHP: CPT | Mod: PBBFAC,PO

## 2025-06-11 NOTE — PROGRESS NOTES
Gagan Escobar is a 65 y.o. male here for a diabetic eye screening with non-dilated fundus photos per Dr. Bowen.    Patient cooperative?: Yes  Small pupils?: Yes  Last eye exam: NA    For exam results, see Encounter Report.

## 2025-06-11 NOTE — Clinical Note
Few scattered MA's in posterior pole of both eyes. No gross diabetic edema suspect. Advise dilated fundus exam in 4-6 months to evaluate.

## 2025-06-18 ENCOUNTER — OFFICE VISIT (OUTPATIENT)
Dept: FAMILY MEDICINE | Facility: CLINIC | Age: 65
End: 2025-06-18
Payer: MEDICAID

## 2025-06-18 VITALS
HEART RATE: 56 BPM | HEIGHT: 72 IN | TEMPERATURE: 98 F | WEIGHT: 211.63 LBS | BODY MASS INDEX: 28.66 KG/M2 | DIASTOLIC BLOOD PRESSURE: 69 MMHG | SYSTOLIC BLOOD PRESSURE: 108 MMHG | OXYGEN SATURATION: 98 %

## 2025-06-18 DIAGNOSIS — M25.512 LEFT SHOULDER PAIN, UNSPECIFIED CHRONICITY: ICD-10-CM

## 2025-06-18 DIAGNOSIS — M47.816 SPONDYLOSIS OF LUMBAR REGION WITHOUT MYELOPATHY OR RADICULOPATHY: ICD-10-CM

## 2025-06-18 DIAGNOSIS — Z23 IMMUNIZATION DUE: ICD-10-CM

## 2025-06-18 DIAGNOSIS — E11.3293 TYPE 2 DIABETES MELLITUS WITH BOTH EYES AFFECTED BY MILD NONPROLIFERATIVE RETINOPATHY WITHOUT MACULAR EDEMA, WITHOUT LONG-TERM CURRENT USE OF INSULIN: ICD-10-CM

## 2025-06-18 DIAGNOSIS — I10 ESSENTIAL HYPERTENSION: ICD-10-CM

## 2025-06-18 DIAGNOSIS — E78.2 MIXED HYPERLIPIDEMIA: ICD-10-CM

## 2025-06-18 DIAGNOSIS — E11.42 TYPE 2 DIABETES MELLITUS WITH DIABETIC POLYNEUROPATHY, WITH LONG-TERM CURRENT USE OF INSULIN: Primary | ICD-10-CM

## 2025-06-18 DIAGNOSIS — Z79.4 TYPE 2 DIABETES MELLITUS WITH DIABETIC POLYNEUROPATHY, WITH LONG-TERM CURRENT USE OF INSULIN: Primary | ICD-10-CM

## 2025-06-18 PROCEDURE — 99214 OFFICE O/P EST MOD 30 MIN: CPT | Mod: 25,S$PBB,, | Performed by: FAMILY MEDICINE

## 2025-06-18 PROCEDURE — 3046F HEMOGLOBIN A1C LEVEL >9.0%: CPT | Mod: CPTII,,, | Performed by: FAMILY MEDICINE

## 2025-06-18 PROCEDURE — 3078F DIAST BP <80 MM HG: CPT | Mod: CPTII,,, | Performed by: FAMILY MEDICINE

## 2025-06-18 PROCEDURE — 1159F MED LIST DOCD IN RCRD: CPT | Mod: CPTII,,, | Performed by: FAMILY MEDICINE

## 2025-06-18 PROCEDURE — 3008F BODY MASS INDEX DOCD: CPT | Mod: CPTII,,, | Performed by: FAMILY MEDICINE

## 2025-06-18 PROCEDURE — 99999PBSHW PR PBB SHADOW TECHNICAL ONLY FILED TO HB: Mod: PBBFAC,,,

## 2025-06-18 PROCEDURE — 4010F ACE/ARB THERAPY RXD/TAKEN: CPT | Mod: CPTII,,, | Performed by: FAMILY MEDICINE

## 2025-06-18 PROCEDURE — 90715 TDAP VACCINE 7 YRS/> IM: CPT | Mod: PBBFAC,PN

## 2025-06-18 PROCEDURE — 90677 PCV20 VACCINE IM: CPT | Mod: PBBFAC,PN

## 2025-06-18 PROCEDURE — 99215 OFFICE O/P EST HI 40 MIN: CPT | Mod: PBBFAC,PN | Performed by: FAMILY MEDICINE

## 2025-06-18 PROCEDURE — 99999 PR PBB SHADOW E&M-EST. PATIENT-LVL V: CPT | Mod: PBBFAC,,, | Performed by: FAMILY MEDICINE

## 2025-06-18 PROCEDURE — 3074F SYST BP LT 130 MM HG: CPT | Mod: CPTII,,, | Performed by: FAMILY MEDICINE

## 2025-06-18 PROCEDURE — 90471 IMMUNIZATION ADMIN: CPT | Mod: PBBFAC,PN

## 2025-06-18 PROCEDURE — 90472 IMMUNIZATION ADMIN EACH ADD: CPT | Mod: PBBFAC,PN

## 2025-06-18 RX ORDER — SEMAGLUTIDE 0.68 MG/ML
0.5 INJECTION, SOLUTION SUBCUTANEOUS
Qty: 9 ML | Refills: 1 | Status: SHIPPED | OUTPATIENT
Start: 2025-06-18 | End: 2025-12-15

## 2025-06-18 RX ADMIN — PNEUMOCOCCAL 20-VALENT CONJUGATE VACCINE 0.5 ML
2.2; 2.2; 2.2; 2.2; 2.2; 2.2; 2.2; 2.2; 2.2; 2.2; 2.2; 2.2; 2.2; 2.2; 2.2; 2.2; 4.4; 2.2; 2.2; 2.2 INJECTION, SUSPENSION INTRAMUSCULAR at 02:06

## 2025-06-18 RX ADMIN — CLOSTRIDIUM TETANI TOXOID ANTIGEN (FORMALDEHYDE INACTIVATED), CORYNEBACTERIUM DIPHTHERIAE TOXOID ANTIGEN (FORMALDEHYDE INACTIVATED), BORDETELLA PERTUSSIS TOXOID ANTIGEN (GLUTARALDEHYDE INACTIVATED), BORDETELLA PERTUSSIS FILAMENTOUS HEMAGGLUTININ ANTIGEN (FORMALDEHYDE INACTIVATED), BORDETELLA PERTUSSIS PERTACTIN ANTIGEN, AND BORDETELLA PERTUSSIS FIMBRIAE 2/3 ANTIGEN 0.5 ML: 5; 2; 2.5; 5; 3; 5 INJECTION, SUSPENSION INTRAMUSCULAR at 02:06

## 2025-06-18 NOTE — PROGRESS NOTES
Subjective:       Patient ID: Gagan Escobar is a 65 y.o. male.    Chief Complaint: Follow-up      History of Present Illness    CHIEF COMPLAINT:  Mr. Escobar presents for follow-up on his diabetes management and to discuss blood sugar control.    HPI:  Mr. Escobar reports currently taking 40 units of insulin daily. His blood sugars remain elevated, particularly at night before bed, despite reduced food intake. Mr. Escobar brings in a blood sugar log showing sugars consistently above 160 and often into the 200s. He has gained about 8 lbs since his last visit.    Mr. Escobar was previously prescribed Ozempic at 1 mg, but discontinued it due to side effects including vomiting, diarrhea, and general malaise. He has been attempting to reduce his portion sizes and often shares meals with his wife when dining out. He goes to the gym 3 times per week for exercise.    Mr. Escobar reports back pain. He was evaluated at the emergency room on June 10th due to severe back pain that impaired his ability to bend over to tie his shoes. He waited for 10 hours but felt his concerns were not adequately addressed by the nurse. He was subsequently referred to Dr. Luna, who diagnosed him with degenerative disc disease due to compressed discs. Mr. Escobar has been seeing a chiropractor for his back issues, who has difficulty manipulating his neck and back.    Mr. Escobar denies being able to tolerate the previously prescribed dose of Ozempic.    MEDICATIONS:  Mr. Escobar is on Insulin 40 units daily. He previously started Ozempic 1 mg but discontinued it due to side effects including nausea, vomiting, and diarrhea. He also discontinued Trulicity 0.5 mg.    MEDICAL HISTORY:  Mr. Escobar has a history of diabetes, hyperlipidemia, and degenerative disc disease.    TEST RESULTS:  Mr. Escobar's blood sugar log shows consistently elevated levels above 160, often reaching into the 200s.    SOCIAL HISTORY:  Occupation: [employer]        ROS:  Constitutional: +fatigue  Eyes: +eye redness  Respiratory: +difficulty breathing  Gastrointestinal: +nausea, +vomiting, +diarrhea  Musculoskeletal: +back pain, +muscle pain, +pain with movement  Psychiatric: +anxiety        Lab Results   Component Value Date    WBC 7.1 05/11/2024    HGB 15.0 05/11/2024    HCT 45.8 05/11/2024     05/11/2024    CHOL 145 05/29/2025    TRIG 110 05/29/2025    HDL 62 05/29/2025    ALT 19 05/29/2025    AST 16 05/29/2025     05/29/2025    K 4.2 05/29/2025     05/29/2025    CREATININE 0.8 05/29/2025    BUN 9 05/29/2025    CO2 25 05/29/2025    TSH 4.870 08/23/2023    PSA 3.32 05/29/2025    HGBA1C 12.0 (H) 05/29/2025       Subjective:       Patient ID: Gagan Escobar is a 65 y.o. male.    Chief Complaint: Follow-up      History of Present Illness    CHIEF COMPLAINT:  Mr. Escobar presents for follow-up on his diabetes management and to discuss blood sugar control.    HPI:  Mr. Escobar reports currently taking 40 units of insulin daily. His blood sugars remain elevated, particularly at night before bed, despite reduced food intake. Mr. Escobar brings in a blood sugar log showing sugars consistently above 160 and often into the 200s. He has gained about 8 lbs since his last visit.    Mr. Escobar was previously prescribed Ozempic at 1 mg, but discontinued it due to side effects including vomiting, diarrhea, and general malaise. He has been attempting to reduce his portion sizes and often shares meals with his wife when dining out. He goes to the gym 3 times per week for exercise.    Mr. Escobar reports back pain. He was evaluated at the emergency room on June 10th due to severe back pain that impaired his ability to bend over to tie his shoes. He waited for 10 hours but felt his concerns were not adequately addressed by the nurse. He was subsequently referred to Dr. Luna, who diagnosed him with degenerative disc disease due to compressed discs.   Shawn has been seeing a chiropractor for his back issues, who has difficulty manipulating his neck and back.    Mr. Escobar denies being able to tolerate the previously prescribed dose of Ozempic.    MEDICATIONS:  Mr. Escobar is on Insulin 40 units daily. He previously started Ozempic 1 mg but discontinued it due to side effects including nausea, vomiting, and diarrhea. He also discontinued Trulicity 0.5 mg.    MEDICAL HISTORY:  Mr. Escobar has a history of diabetes, hyperlipidemia, and degenerative disc disease.    TEST RESULTS:  Mr. Escobar's blood sugar log shows consistently elevated levels above 160, often reaching into the 200s.    SOCIAL HISTORY:  Occupation: [employer]       ROS:  Constitutional: +fatigue  Eyes: +eye redness  Respiratory: +difficulty breathing  Gastrointestinal: +nausea, +vomiting, +diarrhea  Musculoskeletal: +back pain, +muscle pain, +pain with movement  Psychiatric: +anxiety          Allergies and Medications:   Review of patient's allergies indicates:   Allergen Reactions    Ace inhibitors Rash    Pcn [penicillins]      Current Medications[1]    Family History:   Family History   Problem Relation Name Age of Onset    Heart disease Mother      Mental illness Mother      Asthma Father         Social History:   Social History[2]        Objective:     Vitals:    06/18/25 1315   BP: 108/69   Pulse: (!) 56   Temp: 97.5 °F (36.4 °C)        Physical Exam    General: No acute distress. Well-developed. Well-nourished.  Eyes: EOMI. Sclerae anicteric.  HENT: Normocephalic. Atraumatic. Nares patent. Moist oral mucosa.  Cardiovascular: Regular rate. Regular rhythm. No murmurs. No rubs. No gallops. Normal S1, S2.  Respiratory: Normal respiratory effort. Clear to auscultation bilaterally. No rales. No rhonchi. No wheezing.  Musculoskeletal: No  obvious deformity.  Extremities: No lower extremity edema. No edema upper extremities.  Neurological: Alert & oriented x3. No slurred speech. Normal  gait.  Psychiatric: Normal mood. Normal affect. Good insight. Good judgment.  Skin: Warm. Dry. No rash.            Assessment:       1. Type 2 diabetes mellitus with diabetic polyneuropathy, with long-term current use of insulin    2. Type 2 diabetes mellitus with both eyes affected by mild nonproliferative retinopathy without macular edema, without long-term current use of insulin    3. Mixed hyperlipidemia    4. Essential hypertension    5. Left shoulder pain, unspecified chronicity    6. Spondylosis of lumbar region without myelopathy or radiculopathy    7. Immunization due        Plan:       Assessment & Plan    E11.9 Type 2 diabetes mellitus without complications  M51.86 Other intervertebral disc disorders, lumbar region  M54.50 Low back pain, unspecified  T50.995S Adverse effect of other drugs, medicaments and biological substances, sequela  R63.5 Abnormal weight gain  Z79.4 Long term (current) use of insulin    ## TYPE 2 DIABETES MANAGEMENT:  - Monitored patient's blood sugar log showing consistently elevated levels above 160, often into the 200s, especially at night before bed.  - Continuing insulin at current dose of 40 units daily, avoiding dose increase due to weight gain concerns.  - Reduced Ozempic dosage to 0.5 mg due to side effects while maintaining glycemic control.  - Provided dietary education focusing on carbohydrate impact on glucose levels, particularly clarifying misconceptions about high-sugar fruits.  - Advised patient to avoid bread, rice, pasta, potatoes, and limit fruit intake (especially bananas and grapes).  - Recommend using smaller portion plates to help manage blood sugar.    ## BACK PAIN AND DISC ISSUES:  - Referred patient to Dr. Luna in orthopedics for ongoing back pain evaluation, who diagnosed the patient with degenerative disc disease.  - Advised patient to perform home exercises for back pain management.    ## MEDICATION SIDE EFFECTS MANAGEMENT:  - Decreased Ozempic from 1  "mg to 0.5 mg to reduce side effects such as nausea and diarrhea while maintaining benefits.  - Educated patient on relationship between meal size, GI motility, and Ozempic-related side effects.    ## WEIGHT MANAGEMENT:  - Mr. Escobar has gained approximately 8 lbs.  - Avoiding insulin dose increase and discussing potential dose reduction to prevent further weight gain.  - Recommend comprehensive portion control strategy: using a small plate (child's plate from [employer]) divided into sections (half for salad and legumes, smaller portions for meat and vegetables), eating 5 small meals daily instead of 3 larger ones, avoiding buffets, and reducing frequency of eating out.  - Mr. Escobar to continue exercise routine, aiming for 3 times weekly.        Gagan Lobato" was seen today for follow-up.    Diagnoses and all orders for this visit:    Type 2 diabetes mellitus with diabetic polyneuropathy, with long-term current use of insulin  -     Ambulatory referral/consult to Ophthalmology; Future  -     semaglutide (OZEMPIC) 0.25 mg or 0.5 mg (2 mg/3 mL) pen injector; Inject 0.5 mg into the skin every 7 days.    Type 2 diabetes mellitus with both eyes affected by mild nonproliferative retinopathy without macular edema, without long-term current use of insulin  -     Ambulatory referral/consult to Ophthalmology; Future    Mixed hyperlipidemia    Essential hypertension    Left shoulder pain, unspecified chronicity  -     Ambulatory referral/consult to Orthopedics; Future    Spondylosis of lumbar region without myelopathy or radiculopathy    Immunization due  -     Tdap vaccine injection 0.5 mL  -     pneumoc 20-shira conj-dip cr(PF) (PREVNAR-20 (PF)) injection Syrg 0.5 mL         Follow up in about 3 months (around 9/18/2025) for follow up DM.  This note was generated with the assistance of ambient listening technology. Verbal consent was obtained by the patient and accompanying visitor(s) for the recording of patient appointment " to facilitate this note. I attest to having reviewed and edited the generated note for accuracy, though some syntax or spelling errors may persist. Please contact the author of this note for any clarification.       Allergies and Medications:   Review of patient's allergies indicates:   Allergen Reactions    Ace inhibitors Rash    Pcn [penicillins]      Current Medications[3]    Family History:   Family History   Problem Relation Name Age of Onset    Heart disease Mother      Mental illness Mother      Asthma Father         Social History:   Social History[4]        Objective:     Vitals:    06/18/25 1315   BP: 108/69   Pulse: (!) 56   Temp: 97.5 °F (36.4 °C)        Physical Exam    General: No acute distress. Well-developed. Well-nourished.  Eyes: EOMI. Sclerae anicteric.  HENT: Normocephalic. Atraumatic. Nares patent. Moist oral mucosa.  Cardiovascular: Regular rate. Regular rhythm. No murmurs. No rubs. No gallops. Normal S1, S2.  Respiratory: Normal respiratory effort. Clear to auscultation bilaterally. No rales. No rhonchi. No wheezing.  Musculoskeletal: No  obvious deformity.  Extremities: No lower extremity edema. No edema upper extremities.  Neurological: Alert & oriented x3. No slurred speech. Normal gait.  Psychiatric: Normal mood. Normal affect. Good insight. Good judgment.  Skin: Warm. Dry. No rash.            Assessment:       1. Type 2 diabetes mellitus with diabetic polyneuropathy, with long-term current use of insulin    2. Type 2 diabetes mellitus with both eyes affected by mild nonproliferative retinopathy without macular edema, without long-term current use of insulin    3. Mixed hyperlipidemia    4. Essential hypertension    5. Left shoulder pain, unspecified chronicity    6. Spondylosis of lumbar region without myelopathy or radiculopathy    7. Immunization due        Plan:       Assessment & Plan    E11.9 Type 2 diabetes mellitus without complications  M51.86 Other intervertebral disc disorders,  lumbar region  M54.50 Low back pain, unspecified  T50.995S Adverse effect of other drugs, medicaments and biological substances, sequela  R63.5 Abnormal weight gain  Z79.4 Long term (current) use of insulin    ## TYPE 2 DIABETES MANAGEMENT:  - Monitored patient's blood sugar log showing consistently elevated levels above 160, often into the 200s, especially at night before bed.  - Continuing insulin at current dose of 40 units daily, avoiding dose increase due to weight gain concerns.  - Reduced Ozempic dosage to 0.5 mg due to side effects while maintaining glycemic control.  - Provided dietary education focusing on carbohydrate impact on glucose levels, particularly clarifying misconceptions about high-sugar fruits.  - Advised patient to avoid bread, rice, pasta, potatoes, and limit fruit intake (especially bananas and grapes).  - Recommend using smaller portion plates to help manage blood sugar.    ## BACK PAIN AND DISC ISSUES:  - Referred patient to Dr. Luna in orthopedics for ongoing back pain evaluation, who diagnosed the patient with degenerative disc disease.  - Advised patient to perform home exercises for back pain management.    ## MEDICATION SIDE EFFECTS MANAGEMENT:  - Decreased Ozempic from 1 mg to 0.5 mg to reduce side effects such as nausea and diarrhea while maintaining benefits.  - Educated patient on relationship between meal size, GI motility, and Ozempic-related side effects.    ## WEIGHT MANAGEMENT:  - Mr. Escobar has gained approximately 8 lbs.  - Avoiding insulin dose increase and discussing potential dose reduction to prevent further weight gain.  - Recommend comprehensive portion control strategy: using a small plate (child's plate from [employer]) divided into sections (half for salad and legumes, smaller portions for meat and vegetables), eating 5 small meals daily instead of 3 larger ones, avoiding buffets, and reducing frequency of eating out.  - Mr. Escobar to continue exercise routine,  "aiming for 3 times weekly.        Gagan Lobato" was seen today for follow-up.    Diagnoses and all orders for this visit:    Type 2 diabetes mellitus with diabetic polyneuropathy, with long-term current use of insulin  -     Ambulatory referral/consult to Ophthalmology; Future  -     semaglutide (OZEMPIC) 0.25 mg or 0.5 mg (2 mg/3 mL) pen injector; Inject 0.5 mg into the skin every 7 days.    Type 2 diabetes mellitus with both eyes affected by mild nonproliferative retinopathy without macular edema, without long-term current use of insulin  -     Ambulatory referral/consult to Ophthalmology; Future    Mixed hyperlipidemia    Essential hypertension    Left shoulder pain, unspecified chronicity  -     Ambulatory referral/consult to Orthopedics; Future    Spondylosis of lumbar region without myelopathy or radiculopathy    Immunization due  -     Tdap vaccine injection 0.5 mL  -     pneumoc 20-shira conj-dip cr(PF) (PREVNAR-20 (PF)) injection Syrg 0.5 mL         Follow up in about 3 months (around 9/18/2025) for follow up DM.  This note was generated with the assistance of ambient listening technology. Verbal consent was obtained by the patient and accompanying visitor(s) for the recording of patient appointment to facilitate this note. I attest to having reviewed and edited the generated note for accuracy, though some syntax or spelling errors may persist. Please contact the author of this note for any clarification.            [1]   Current Outpatient Medications   Medication Sig Dispense Refill    amLODIPine (NORVASC) 5 MG tablet Take 1 tablet (5 mg total) by mouth every morning. 90 tablet 0    aspirin (ECOTRIN) 81 MG EC tablet Take 1 tablet (81 mg total) by mouth once daily. 90 tablet 1    atorvastatin (LIPITOR) 20 MG tablet Take 1 tablet (20 mg total) by mouth every evening. 90 tablet 1    blood sugar diagnostic Strp 2 strips by Misc.(Non-Drug; Combo Route) route 2 (two) times a day. 200 strip 1    blood-glucose " "meter kit Use as instructed 1 each 0    blood-glucose meter,continuous Misc 1 Device by Misc.(Non-Drug; Combo Route) route 4 (four) times daily. 1 each 0    clotrimazole-betamethasone 1-0.05% (LOTRISONE) cream       EScitalopram oxalate (LEXAPRO) 10 MG tablet Take 1 tablet (10 mg total) by mouth once daily. 90 tablet 3    insulin glargine U-100, Lantus, (LANTUS SOLOSTAR U-100 INSULIN) 100 unit/mL (3 mL) InPn pen Inject 40 Units into the skin every evening. ADMINISTER 30 UNITS UNDER THE SKIN EVERY EVENING 36 mL 3    lancets 30 gauge Misc 2 lancets by Misc.(Non-Drug; Combo Route) route 2 (two) times a day. 200 each 1    lancets Misc To check BG 2 times daily, to use with insurance preferred meter 100 each 5    nystatin (MYCOSTATIN) cream       olmesartan (BENICAR) 40 MG tablet Take 1 tablet (40 mg total) by mouth once daily. 90 tablet 1    pen needle, diabetic (BD ULTRA-FINE SHORT PEN NEEDLE) 31 gauge x 5/16" Ndle USE 1 PEN NEEDLE DAILY TO ADMINISTER INSULIN 100 each 5    triamcinolone acetonide 0.1% (KENALOG) 0.1 % cream       blood-glucose sensor (DEXCOM G7 SENSOR) Violet 1 Device by Misc.(Non-Drug; Combo Route) route 4 (four) times daily. (Patient not taking: Reported on 6/18/2025) 1 each 0    cetirizine (ZYRTEC) 10 MG tablet  (Patient not taking: Reported on 6/18/2025)      fluticasone propionate (FLONASE) 50 mcg/actuation nasal spray  (Patient not taking: Reported on 6/18/2025)      omeprazole (PRILOSEC) 40 MG capsule  (Patient not taking: Reported on 6/18/2025)      potassium chloride (KLOR-CON) 10 MEQ TbSR  (Patient not taking: Reported on 6/18/2025)      semaglutide (OZEMPIC) 0.25 mg or 0.5 mg (2 mg/3 mL) pen injector Inject 0.5 mg into the skin every 7 days. 9 mL 1     No current facility-administered medications for this visit.   [2]   Social History  Socioeconomic History    Marital status:    Tobacco Use    Smoking status: Never    Smokeless tobacco: Current   Substance and Sexual Activity    Alcohol " "use: Yes    Drug use: No    Sexual activity: Yes     Social Drivers of Health     Stress: No Stress Concern Present (10/9/2020)    [employer] [place] [employer] - Occupational Stress Questionnaire     Feeling of Stress : Not at all   [3]   Current Outpatient Medications   Medication Sig Dispense Refill    amLODIPine (NORVASC) 5 MG tablet Take 1 tablet (5 mg total) by mouth every morning. 90 tablet 0    aspirin (ECOTRIN) 81 MG EC tablet Take 1 tablet (81 mg total) by mouth once daily. 90 tablet 1    atorvastatin (LIPITOR) 20 MG tablet Take 1 tablet (20 mg total) by mouth every evening. 90 tablet 1    blood sugar diagnostic Strp 2 strips by Misc.(Non-Drug; Combo Route) route 2 (two) times a day. 200 strip 1    blood-glucose meter kit Use as instructed 1 each 0    blood-glucose meter,continuous Misc 1 Device by Misc.(Non-Drug; Combo Route) route 4 (four) times daily. 1 each 0    clotrimazole-betamethasone 1-0.05% (LOTRISONE) cream       EScitalopram oxalate (LEXAPRO) 10 MG tablet Take 1 tablet (10 mg total) by mouth once daily. 90 tablet 3    insulin glargine U-100, Lantus, (LANTUS SOLOSTAR U-100 INSULIN) 100 unit/mL (3 mL) InPn pen Inject 40 Units into the skin every evening. ADMINISTER 30 UNITS UNDER THE SKIN EVERY EVENING 36 mL 3    lancets 30 gauge Misc 2 lancets by Misc.(Non-Drug; Combo Route) route 2 (two) times a day. 200 each 1    lancets Misc To check BG 2 times daily, to use with insurance preferred meter 100 each 5    nystatin (MYCOSTATIN) cream       olmesartan (BENICAR) 40 MG tablet Take 1 tablet (40 mg total) by mouth once daily. 90 tablet 1    pen needle, diabetic (BD ULTRA-FINE SHORT PEN NEEDLE) 31 gauge x 5/16" Ndle USE 1 PEN NEEDLE DAILY TO ADMINISTER INSULIN 100 each 5    triamcinolone acetonide 0.1% (KENALOG) 0.1 % cream       blood-glucose sensor (DEXCOM G7 SENSOR) Violet 1 Device by Misc.(Non-Drug; Combo Route) route 4 (four) times daily. (Patient not taking: Reported on 6/18/2025) 1 each " 0    cetirizine (ZYRTEC) 10 MG tablet  (Patient not taking: Reported on 6/18/2025)      fluticasone propionate (FLONASE) 50 mcg/actuation nasal spray  (Patient not taking: Reported on 6/18/2025)      omeprazole (PRILOSEC) 40 MG capsule  (Patient not taking: Reported on 6/18/2025)      potassium chloride (KLOR-CON) 10 MEQ TbSR  (Patient not taking: Reported on 6/18/2025)      semaglutide (OZEMPIC) 0.25 mg or 0.5 mg (2 mg/3 mL) pen injector Inject 0.5 mg into the skin every 7 days. 9 mL 1     No current facility-administered medications for this visit.   [4]   Social History  Socioeconomic History    Marital status:    Tobacco Use    Smoking status: Never    Smokeless tobacco: Current   Substance and Sexual Activity    Alcohol use: Yes    Drug use: No    Sexual activity: Yes     Social Drivers of Health     Stress: No Stress Concern Present (10/9/2020)    [employer] [place] [employer] - Occupational Stress Questionnaire     Feeling of Stress : Not at all

## 2025-06-18 NOTE — PATIENT INSTRUCTIONS
We understand that controlling diabetes can feel overwhelming at times. We are here to offer the tools and support to help you manage your diabetes and meet your health goals. Please reference the meal planning guide below.     Diabetic Meal Planning    About this topic  Healthy eating is an important part of keeping your diabetes in control. A balanced diet along with your diabetic drugs will help you control your blood sugar. The right portions of healthy foods may help keep your sugar within your goal range. It may also help to lower or maintain your weight, manage cholesterol, the amount of fat in your blood, and blood pressure.      Image(s)    What will the results be?  Healthy eating may help you lower your blood sugar and keep it in a safe range. Keeping your blood sugar in a safe range may lower your chances for long term problems from your diabetes. You may be less likely to have damage to your kidneys, heart, eyes, or nerves.    What changes to diet are needed?  Healthy eating means:  Eating a range of foods from all food groups.  Eating the right size portions. Read the nutrition facts on each food you eat.  Eating meals and snacks at the same time each day. Do not skip a meal or snack. Skipping meals may cause your blood sugar to go too low, especially if you are on drugs for your diabetes. Skipping meals can also cause you to eat too much at the next meal.  Talk to your diabetes educator about making a personal meal plan for you. They can also help you eat the foods you like by modifying them to be healthier.    Who should use this diet?  This diet is helpful to people with high blood sugar or diabetes.    What foods are good to eat?  It is important to make a healthy meal. Eat a variety of different foods in the right portion.  Fill half of your plate with non-starchy vegetables. Non-starchy vegetables include: Broccoli, green beans, carrots, greens (nidhi, kale, mustard, turnip), onions, tomatoes,  asparagus, beets, cauliflower, celery, and cucumber. Non-starchy vegetables are high in fiber and low in carbohydrates. These will help keep you full for longer without raising your blood sugar.  Fill 1/4 of your plate with carbohydrates. Try to choose whole grain options. Whole-grain products have more fiber which will make you feel full. Carbohydrates include: Bread, rice, pasta, and starchy vegetables. Starchy vegetables include beans, potatoes, acorn squash, butternut squash, corn, and peas.  Fill 1/4 of your plate with protein. Choose low-fat cuts of meat like boneless, skinless chicken breast; pork loin; 90% lean beef; lean and skinless turkey meat; and fresh fish (not fried) such as tuna, salmon, or mackerel.  Add a low-fat or non-fat dairy product like 8 ounces (240 mL) of 1% or skim milk or 6 ounces (180 mL) of low-fat yogurt. Eat the recommended serving. Milk and yogurt have carbohydrates which raise your blood sugar.  Add a small piece of fruit or 1/2 cup (120 grams) of frozen or canned fruit. Choose canned fruits in juice or water. Fruits include apples, bananas, peaches, pears, pineapple, plums, and oranges. Eat the recommended serving. Fruit has carbohydrates which can raise your blood sugar.  Include healthy fats in your meal like olive oil, canola oil, avocado, and nuts.  Other good foods to include in your diet are:  Foods high in fiber. Adding fiber to your meals may help control your blood sugar and cholesterol levels. It may also help with weight loss and prevent belly problems. If you are younger than 50, it is recommended to get 25 to 38 grams per day. If you are older than 50, it is recommended to get 21 to 30 grams per day. Good sources of fiber include:  Nuts and seeds  Beans, peas, and other legumes  Whole-grain products  Fruits  Vegetables  Smart snacks in the right portion. Do not go too long in between meals. Ask your dietitian when you should have a snack in between your meals. Snack on  things like:  Nuts  Vegetables and low-fat dressing  Light popcorn  Low-fat cheese and crackers  1/2 sandwich    What foods should be limited or avoided?  You may need to limit the amount of some foods you eat and how often you eat them. Foods that should be limited include:  High fat or processed foods like:  Peoples  Sausage  Hot dogs  Whole-fat dairy products  Potato chips  Foods high in sodium like:  Canned soups  Soy sauce and some salad dressings  Cured meats  Salted snack foods like pretzels  Olives  Fats and oils like:  Margarine  Salad dressing  Gravy  Lard or shortening  Foods high in sugar like:  Candy  Cookies  Cake  Ice cream  Table sugar  Soda  Juice drinks  Starches that are not whole grain like:  White rice  French fries  White pasta  White bread  Sugary cereals  Baked goods, pastries, croissants  Beer, wine, and mixed drinks (alcohol). Drink alcohol in moderation (1 drink per day or less for adult women and 2 drinks per day or less for adult men). Drinking alcohol can cause fluctuations in your blood sugar and interfere with how your diabetic drugs work. Talk to your doctor about how you can safely include alcohol into your diet.    What can be done to prevent this health problem?  Some people have a higher chance of developing diabetes than others. This is a life-long problem. You can still lead a normal life. Diabetes can be managed through diet, exercise, and drugs. It is important to have support from your family and friends to help you with your goals.    When do I need to call the doctor?  Blood sugar level is above 240 mg/dL for more than a day  Blood sugar level drops to less than 40 and does not respond to 15 grams of simple carbohydrate, like 4 glucose tablets or 1/2 cup (120 mL) of fruit juice  Trouble breathing  Very sleepy and trouble concentrating  Feeling very thirsty  Needing to urinate (pee) more than normal  Throw up more than once or have many loose stools  You are so sick you  cannot eat or drink  Fever over 101°F (38°C)  Questions about your diet plan  You are not feeling better in 2 to 3 days or you are feeling worse    Helpful tips  Plan ahead. Plan your meals and grocerAvoid anything with sugar in the 1st 3 ingredients including honey and syrup.  Avoid dried fruits like raise nodes and apricots.  Other fruits and vegetables are okay but the sugar content is higher in bananas and grapes.  Avoid large portions on your starchy foods:  Bread rice potatoes and pasta. y list before going to the store. This will help you to choose foods that are good for you.  Pack a lunch. Take healthy meals and snacks with you to work.  Keep a food journal to help keep you on track. Take note of foods that keep your blood sugar in goal range. Also note foods and meals that raise or lower your blood sugar. There are apps for your phone that can help with this.  Visit a restaurant's website before eating out. You can see the menu options and nutritional facts. This way, you can see which items best fit into your diet plan. Call ahead if you have questions.    Disclaimer.This generalized information is a limited summary of diagnosis, treatment, and/or medication information. It is not meant to be comprehensive and should be used as a tool to help the user understand and/or assess potential diagnostic and treatment options. It does NOT include all information about conditions, treatments, medications, side effects, or risks that may apply to a specific patient. It is not intended to be medical advice or a substitute for the medical advice, diagnosis, or treatment of a health care provider based on the health care provider's examination and assessment of a patient's specific and unique circumstances. Patients must speak with a health care provider for complete information about their health, medical questions, and treatment options, including any risks or benefits regarding use of medications. This information  does not endorse any treatments or medications as safe, effective, or approved for treating a specific patient. [employer] and its affiliates disclaim any warranty or liability relating to this information or the use thereof. The use of this information is governed by the Terms of Use, available at Terms of Use. ©2022 [employer], Inc. and its affiliates and/or licensors. All rights reserved.

## 2025-06-20 ENCOUNTER — TELEPHONE (OUTPATIENT)
Dept: FAMILY MEDICINE | Facility: CLINIC | Age: 65
End: 2025-06-20
Payer: MEDICAID

## 2025-06-20 NOTE — TELEPHONE ENCOUNTER
Patient has 0.25 because he was vomiting on the 1 mg.     Pharmacy was made aware.  
Pharmacy is questioning whether patient is still on Ozempic 1mg. Or back to 0.25.  
DISCHARGE

## 2025-06-23 ENCOUNTER — TELEPHONE (OUTPATIENT)
Dept: FAMILY MEDICINE | Facility: CLINIC | Age: 65
End: 2025-06-23
Payer: MEDICAID

## 2025-06-23 NOTE — TELEPHONE ENCOUNTER
Spoke with Melissa and advised to call to see if they accept his insurance and to call us back and let me know.

## 2025-06-24 ENCOUNTER — TELEPHONE (OUTPATIENT)
Dept: FAMILY MEDICINE | Facility: CLINIC | Age: 65
End: 2025-06-24
Payer: MEDICAID

## 2025-06-24 DIAGNOSIS — M25.512 LEFT SHOULDER PAIN, UNSPECIFIED CHRONICITY: Primary | ICD-10-CM

## 2025-06-24 NOTE — TELEPHONE ENCOUNTER
----- Message from Danitza sent at 6/23/2025  2:38 PM CDT -----  Contact: Melissa  Pt needs a referral to Dr. Conley. Melissa #[phone]

## 2025-06-24 NOTE — TELEPHONE ENCOUNTER
----- Message from Aminta sent at 6/24/2025  9:28 AM CDT -----  Girlfriend  selena calling in regarding to returning a phone call. Due to needing to get more information about patient surgeon. Patient did speak with Dr Bowen about his shoulder.   [phone]

## 2025-06-24 NOTE — TELEPHONE ENCOUNTER
Patient is asking for a referral to see Dr. Conley for his shoulder.  Please  place diagnosis, states they have spoken with you in regards to his left shoulder pain.  If you agree please sign and diagnose.

## 2025-06-25 ENCOUNTER — TELEPHONE (OUTPATIENT)
Dept: FAMILY MEDICINE | Facility: CLINIC | Age: 65
End: 2025-06-25
Payer: MEDICAID

## 2025-06-26 ENCOUNTER — TELEPHONE (OUTPATIENT)
Dept: FAMILY MEDICINE | Facility: CLINIC | Age: 65
End: 2025-06-26
Payer: MEDICAID

## 2025-06-26 NOTE — TELEPHONE ENCOUNTER
Denied  Prior Authorization Portal   Note from payer: Medication not covered  Payer: [facility] [place] [place] [employer] - Medicaid Case ID: [id number]  Electronic appeal: Not supported  View History

## 2025-07-17 ENCOUNTER — HOSPITAL ENCOUNTER (OUTPATIENT)
Dept: RADIOLOGY | Facility: HOSPITAL | Age: 65
Discharge: HOME | End: 2025-07-17
Attending: ORTHOPAEDIC SURGERY
Payer: MEDICAID

## 2025-07-17 ENCOUNTER — OFFICE VISIT (OUTPATIENT)
Dept: ORTHOPEDICS | Facility: CLINIC | Age: 65
End: 2025-07-17
Payer: MEDICAID

## 2025-07-17 VITALS — BODY MASS INDEX: 28.66 KG/M2 | WEIGHT: 211.63 LBS | HEIGHT: 72 IN

## 2025-07-17 DIAGNOSIS — M19.012 ARTHRITIS OF LEFT GLENOHUMERAL JOINT: ICD-10-CM

## 2025-07-17 DIAGNOSIS — M12.812 ROTATOR CUFF ARTHROPATHY, LEFT: ICD-10-CM

## 2025-07-17 DIAGNOSIS — M75.52 SUBACROMIAL BURSITIS OF LEFT SHOULDER JOINT: Primary | ICD-10-CM

## 2025-07-17 PROCEDURE — 99999PBSHW PR PBB SHADOW TECHNICAL ONLY FILED TO HB: Mod: PBBFAC,,,

## 2025-07-17 PROCEDURE — 99999 PR PBB SHADOW E&M-EST. PATIENT-LVL IV: CPT | Mod: PBBFAC,,, | Performed by: ORTHOPAEDIC SURGERY

## 2025-07-17 PROCEDURE — 73030 X-RAY EXAM OF SHOULDER: CPT | Mod: TC,PN,LT

## 2025-07-17 PROCEDURE — 20610 DRAIN/INJ JOINT/BURSA W/O US: CPT | Mod: PBBFAC,PN,LT | Performed by: ORTHOPAEDIC SURGERY

## 2025-07-17 PROCEDURE — 73030 X-RAY EXAM OF SHOULDER: CPT | Mod: 26,LT,, | Performed by: RADIOLOGY

## 2025-07-17 PROCEDURE — 99214 OFFICE O/P EST MOD 30 MIN: CPT | Mod: PBBFAC,25,PN | Performed by: ORTHOPAEDIC SURGERY

## 2025-07-17 RX ORDER — TRIAMCINOLONE ACETONIDE 40 MG/ML
40 INJECTION, SUSPENSION INTRA-ARTICULAR; INTRAMUSCULAR
Status: DISCONTINUED | OUTPATIENT
Start: 2025-07-17 | End: 2025-07-17 | Stop reason: HOSPADM

## 2025-07-17 RX ADMIN — TRIAMCINOLONE ACETONIDE 40 MG: 40 INJECTION, SUSPENSION INTRA-ARTICULAR; INTRAMUSCULAR at 01:07

## 2025-07-17 NOTE — PROCEDURES
Large Joint Aspiration/Injection: L subacromial bursa    Date/Time: 7/17/2025 1:30 PM    Performed by: Nathen Conley MD  Authorized by: Nathen Conley MD    Consent Done?:  Yes (Verbal)  Indications:  Pain  Site marked: the procedure site was marked    Timeout: prior to procedure the correct patient, procedure, and site was verified    Prep: patient was prepped and draped in usual sterile fashion      Local anesthesia used?: Yes    Local anesthetic:  Lidocaine 1% without epinephrine    Details:  Needle Size:  25 G  Ultrasonic Guidance for needle placement?: No    Location:  Shoulder  Site:  L subacromial bursa  Medications:  40 mg triamcinolone acetonide 40 mg/mL  Patient tolerance:  Patient tolerated the procedure well with no immediate complications

## 2025-07-17 NOTE — PROGRESS NOTES
[facility]    Subjective:     Chief Complaint:   Chief Complaint   Patient presents with    Left Shoulder - Pain     Occurring for years but getting worse, aches all day, cannot lift up all day and bend backward, carrying a bucket of mud and felt something tear years ago ached for a few weeks and went away but now is back        Past Medical History:   Diagnosis Date    Diabetes mellitus type 2 with complications     Hyperlipidemia     Hypertension        Past Surgical History:   Procedure Laterality Date    FOOT SURGERY      NECK SURGERY  02/2020       Current Outpatient Medications   Medication Sig    amLODIPine (NORVASC) 5 MG tablet Take 1 tablet (5 mg total) by mouth every morning.    aspirin (ECOTRIN) 81 MG EC tablet Take 1 tablet (81 mg total) by mouth once daily.    atorvastatin (LIPITOR) 20 MG tablet Take 1 tablet (20 mg total) by mouth every evening.    blood sugar diagnostic Strp 2 strips by Misc.(Non-Drug; Combo Route) route 2 (two) times a day.    blood-glucose meter kit Use as instructed    clotrimazole-betamethasone 1-0.05% (LOTRISONE) cream     EScitalopram oxalate (LEXAPRO) 10 MG tablet Take 1 tablet (10 mg total) by mouth once daily.    insulin glargine U-100, Lantus, (LANTUS SOLOSTAR U-100 INSULIN) 100 unit/mL (3 mL) InPn pen Inject 40 Units into the skin every evening. ADMINISTER 30 UNITS UNDER THE SKIN EVERY EVENING    lancets 30 gauge Misc 2 lancets by Misc.(Non-Drug; Combo Route) route 2 (two) times a day.    olmesartan (BENICAR) 40 MG tablet Take 1 tablet (40 mg total) by mouth once daily.    blood-glucose meter,continuous Misc 1 Device by Misc.(Non-Drug; Combo Route) route 4 (four) times daily. (Patient not taking: Reported on 7/17/2025)    blood-glucose sensor (DEXCOM G7 SENSOR) Violet 1 Device by Misc.(Non-Drug; Combo Route) route 4 (four) times daily. (Patient not taking: Reported on 5/29/2025)    cetirizine (ZYRTEC) 10 MG tablet  (Patient not taking: Reported on 5/29/2025)     "fluticasone propionate (FLONASE) 50 mcg/actuation nasal spray  (Patient not taking: Reported on 5/29/2025)    lancets Misc To check BG 2 times daily, to use with insurance preferred meter (Patient not taking: Reported on 7/17/2025)    nystatin (MYCOSTATIN) cream  (Patient not taking: Reported on 7/17/2025)    omeprazole (PRILOSEC) 40 MG capsule  (Patient not taking: Reported on 5/29/2025)    pen needle, diabetic (BD ULTRA-FINE SHORT PEN NEEDLE) 31 gauge x 5/16" Ndle USE 1 PEN NEEDLE DAILY TO ADMINISTER INSULIN (Patient not taking: Reported on 7/17/2025)    potassium chloride (KLOR-CON) 10 MEQ TbSR  (Patient not taking: Reported on 5/29/2025)    semaglutide (OZEMPIC) 0.25 mg or 0.5 mg (2 mg/3 mL) pen injector Inject 0.5 mg into the skin every 7 days. (Patient not taking: Reported on 7/17/2025)    triamcinolone acetonide 0.1% (KENALOG) 0.1 % cream  (Patient not taking: Reported on 7/17/2025)     No current facility-administered medications for this visit.       Review of patient's allergies indicates:   Allergen Reactions    Ace inhibitors Rash    Pcn [penicillins]        Family History   Problem Relation Name Age of Onset    Heart disease Mother      Mental illness Mother      Asthma Father         Social History[1]    History of present illness:   Marvin comes in today with a chief complaint of left shoulder pain that has been going on for several years.  It is progressively getting worse over time.  He did feel a "tear" when carrying a heavy bucket several years ago.  Fortunately, he is right-hand dominant.  He has pain off and on for years.  He has gotten progressively worse over the past 3 months or so.  Complains of pain, weakness, decreased range of motion.    Review of Systems:    Constitution: Negative for chills, fever, and sweats.  Negative for unexplained weight loss.    HENT:  Negative for headaches and blurry vision.    Cardiovascular:Negative for chest pain or irregular heart beat. Negative for " "hypertension.    Respiratory:  Negative for cough and shortness of breath.    Gastrointestinal: Negative for abdominal pain, heartburn, melena, nausea, and vomitting.    Genitourinary:  Negative bladder incontinence and dysuria.    Musculoskeletal:  See HPI for details.     Neurological: Negative for numbness.    Psychiatric/Behavioral: Negative for depression.  The patient is not nervous/anxious.      Endocrine: Negative for polyuria    Hematologic/Lymphatic: Negative for bleeding problem.  Does not bruise/bleed easily.    Skin: Negative for poor would healing and rash    Objective:      Physical Examination:    Vital Signs:  There were no vitals filed for this visit.    Body mass index is 28.7 kg/m².    This a well-developed, well nourished patient in no acute distress.  They are alert and oriented and cooperative to examination.          Left shoulder exam: Skin to left shoulder is clean dry and intact.  There is no erythema or ecchymosis.  There are no signs or symptoms of infection.  He is neurovascularly intact throughout the left upper extremity.  He forward flexes to about 170, has full internal rotation, very limited external rotation to 20° at best.  His left rotator cuff tendon is weak and painful to any resisted muscle testing.      Pertinent New Results:    XRAY Report / Interpretation:   Two views taken of the left shoulder today:  AP and Y-view.  No acute fractures or dislocations seen.  He has a high-riding humeral head in regards to the glenoid.    Assessment/Plan:        1. Left shoulder rotator cuff arthropathy.      Dr Conley injected his Left shoulder today with 40mg Kenalog and Lidocaine.  RTC 3 months.  He will ultimately need Left TSA.      Daniel Jose, Physician Assistant, served in the capacity as a "scribe" for this patient encounter.  A "face-to-face" encounter occurred with Dr. Nathen Conley on this date.  The treatment plan and medical decision-making is outlined above. " Patient was seen and examined with a chaperone.       This note was created using Dragon voice recognition software that occasionally misinterpreted phrases or words.               [1]   Social History  Socioeconomic History    Marital status:    Tobacco Use    Smoking status: Never    Smokeless tobacco: Current   Substance and Sexual Activity    Alcohol use: Yes    Drug use: No    Sexual activity: Yes     Social Drivers of Health     Stress: No Stress Concern Present (10/9/2020)    [employer] - Occupational Stress Questionnaire     Feeling of Stress : Not at all

## 2025-07-24 DIAGNOSIS — E78.2 MIXED HYPERLIPIDEMIA: ICD-10-CM

## 2025-07-25 RX ORDER — ATORVASTATIN CALCIUM 20 MG/1
20 TABLET, FILM COATED ORAL NIGHTLY
Qty: 90 TABLET | Refills: 3 | Status: SHIPPED | OUTPATIENT
Start: 2025-07-25 | End: 2026-07-20

## 2025-07-25 NOTE — TELEPHONE ENCOUNTER
No care due was identified.  [employer] Embedded Care Due Messages. Reference number: [id number].   7/25/2025 12:53:23 PM CDT

## 2025-07-25 NOTE — TELEPHONE ENCOUNTER
Refill Decision Note   Gagan Shawn  is requesting a refill authorization.  Brief Assessment and Rationale for Refill:  Approve     Medication Therapy Plan:         Comments:     Note composed:12:54 PM 07/25/2025

## 2025-07-31 DIAGNOSIS — I10 ESSENTIAL HYPERTENSION: ICD-10-CM

## 2025-07-31 RX ORDER — AMLODIPINE BESYLATE 5 MG/1
5 TABLET ORAL EVERY MORNING
Qty: 90 TABLET | Refills: 3 | Status: SHIPPED | OUTPATIENT
Start: 2025-07-31

## 2025-07-31 NOTE — TELEPHONE ENCOUNTER
Refill Decision Note   Gagan Shawn  is requesting a refill authorization.  Brief Assessment and Rationale for Refill:  Approve     Medication Therapy Plan:         Comments:     Note composed:12:18 PM 07/31/2025

## 2025-07-31 NOTE — TELEPHONE ENCOUNTER
No care due was identified.  Health [facility] Embedded Care Due Messages. Reference number: [id number].   7/31/2025 9:56:20 AM CDT

## 2025-08-02 DIAGNOSIS — E11.65 TYPE 2 DIABETES MELLITUS WITH HYPERGLYCEMIA, WITHOUT LONG-TERM CURRENT USE OF INSULIN: ICD-10-CM

## 2025-08-02 RX ORDER — BLOOD SUGAR DIAGNOSTIC
STRIP MISCELLANEOUS
Qty: 200 EACH | Refills: 3 | Status: SHIPPED | OUTPATIENT
Start: 2025-08-02

## 2025-08-02 NOTE — TELEPHONE ENCOUNTER
Refill Decision Note   Gagan Shawn  is requesting a refill authorization.  Brief Assessment and Rationale for Refill:  Approve     Medication Therapy Plan:        Comments:     Note composed:12:34 PM 08/02/2025

## 2025-08-02 NOTE — TELEPHONE ENCOUNTER
No care due was identified.  Health [facility] Embedded Care Due Messages. Reference number: [id number].   8/02/2025 10:53:03 AM CDT

## 2025-08-07 ENCOUNTER — TELEPHONE (OUTPATIENT)
Dept: FAMILY MEDICINE | Facility: CLINIC | Age: 65
End: 2025-08-07
Payer: MEDICAID

## 2025-08-07 RX ORDER — LANCETS
EACH MISCELLANEOUS
Qty: 200 EACH | Refills: 3 | Status: SHIPPED | OUTPATIENT
Start: 2025-08-07

## 2025-08-07 RX ORDER — INSULIN PUMP SYRINGE, 3 ML
EACH MISCELLANEOUS
Qty: 1 EACH | Refills: 0 | Status: SHIPPED | OUTPATIENT
Start: 2025-08-07 | End: 2026-08-07

## 2025-08-07 RX ORDER — BLOOD SUGAR DIAGNOSTIC
STRIP MISCELLANEOUS
Qty: 200 EACH | Refills: 0 | OUTPATIENT
Start: 2025-08-07

## 2025-08-07 NOTE — TELEPHONE ENCOUNTER
Refill Decision Note   Gagan Shawn  is requesting a refill authorization.  Brief Assessment and Rationale for Refill:  Quick Discontinue     Medication Therapy Plan:        Comments:     Note composed:4:56 PM 08/07/2025

## 2025-08-14 ENCOUNTER — TELEPHONE (OUTPATIENT)
Dept: FAMILY MEDICINE | Facility: CLINIC | Age: 65
End: 2025-08-14
Payer: MEDICAID

## 2025-09-03 ENCOUNTER — TELEPHONE (OUTPATIENT)
Dept: FAMILY MEDICINE | Facility: CLINIC | Age: 65
End: 2025-09-03
Payer: MEDICAID